# Patient Record
Sex: FEMALE | Race: BLACK OR AFRICAN AMERICAN | Employment: FULL TIME | URBAN - METROPOLITAN AREA
[De-identification: names, ages, dates, MRNs, and addresses within clinical notes are randomized per-mention and may not be internally consistent; named-entity substitution may affect disease eponyms.]

---

## 2023-11-01 ENCOUNTER — HOSPITAL ENCOUNTER (EMERGENCY)
Age: 68
Discharge: HOME | End: 2023-11-01
Attending: EMERGENCY MEDICINE
Payer: MEDICAID

## 2023-11-01 ENCOUNTER — APPOINTMENT (OUTPATIENT)
Dept: GENERAL RADIOLOGY | Age: 68
End: 2023-11-01
Payer: MEDICAID

## 2023-11-01 VITALS
OXYGEN SATURATION: 92 % | RESPIRATION RATE: 18 BRPM | TEMPERATURE: 97.8 F | HEART RATE: 73 BPM | DIASTOLIC BLOOD PRESSURE: 88 MMHG | SYSTOLIC BLOOD PRESSURE: 168 MMHG | WEIGHT: 197 LBS

## 2023-11-01 DIAGNOSIS — R09.02 HYPOXIA: ICD-10-CM

## 2023-11-01 DIAGNOSIS — I10 PRIMARY HYPERTENSION: ICD-10-CM

## 2023-11-01 DIAGNOSIS — J18.9 PNEUMONIA DUE TO INFECTIOUS ORGANISM, UNSPECIFIED LATERALITY, UNSPECIFIED PART OF LUNG: ICD-10-CM

## 2023-11-01 DIAGNOSIS — R06.09 DYSPNEA ON EXERTION: Primary | ICD-10-CM

## 2023-11-01 DIAGNOSIS — J44.9 CHRONIC OBSTRUCTIVE PULMONARY DISEASE, UNSPECIFIED COPD TYPE (HCC): ICD-10-CM

## 2023-11-01 LAB
ANION GAP SERPL CALCULATED.3IONS-SCNC: 21 MMOL/L (ref 7–16)
BASOPHILS # BLD: 0.04 K/UL (ref 0–0.2)
BASOPHILS NFR BLD: 1 % (ref 0–2)
BILIRUB UR QL STRIP: NEGATIVE
BUN SERPL-MCNC: 6 MG/DL (ref 6–23)
CALCIUM SERPL-MCNC: 9.6 MG/DL (ref 8.6–10.2)
CHLORIDE SERPL-SCNC: 102 MMOL/L (ref 98–107)
CLARITY UR: CLEAR
CO2 SERPL-SCNC: 19 MMOL/L (ref 22–29)
COLOR UR: YELLOW
COMMENT: ABNORMAL
CREAT SERPL-MCNC: 0.5 MG/DL (ref 0.5–1)
D DIMER: 290 NG/ML DDU (ref 0–232)
EOSINOPHIL # BLD: 0.28 K/UL (ref 0.05–0.5)
EOSINOPHILS RELATIVE PERCENT: 4 % (ref 0–6)
ERYTHROCYTE [DISTWIDTH] IN BLOOD BY AUTOMATED COUNT: 13.3 % (ref 11.5–15)
ERYTHROCYTE [SEDIMENTATION RATE] IN BLOOD BY WESTERGREN METHOD: 35 MM/HR (ref 0–20)
GFR SERPL CREATININE-BSD FRML MDRD: >60 ML/MIN/1.73M2
GLUCOSE SERPL-MCNC: 128 MG/DL (ref 74–99)
GLUCOSE UR STRIP-MCNC: NEGATIVE MG/DL
HCT VFR BLD AUTO: 45.6 % (ref 34–48)
HGB BLD-MCNC: 15 G/DL (ref 11.5–15.5)
HGB UR QL STRIP.AUTO: NEGATIVE
IMM GRANULOCYTES # BLD AUTO: 0.03 K/UL (ref 0–0.58)
IMM GRANULOCYTES NFR BLD: 1 % (ref 0–5)
KETONES UR STRIP-MCNC: 15 MG/DL
LEUKOCYTE ESTERASE UR QL STRIP: NEGATIVE
LYMPHOCYTES NFR BLD: 1.83 K/UL (ref 1.5–4)
LYMPHOCYTES RELATIVE PERCENT: 29 % (ref 20–42)
MCH RBC QN AUTO: 31.2 PG (ref 26–35)
MCHC RBC AUTO-ENTMCNC: 32.9 G/DL (ref 32–34.5)
MCV RBC AUTO: 94.8 FL (ref 80–99.9)
MONOCYTES NFR BLD: 0.33 K/UL (ref 0.1–0.95)
MONOCYTES NFR BLD: 5 % (ref 2–12)
NEUTROPHILS NFR BLD: 60 % (ref 43–80)
NEUTS SEG NFR BLD: 3.79 K/UL (ref 1.8–7.3)
NITRITE UR QL STRIP: NEGATIVE
PH UR STRIP: 6 [PH] (ref 5–9)
PLATELET # BLD AUTO: 323 K/UL (ref 130–450)
PMV BLD AUTO: 9.1 FL (ref 7–12)
POTASSIUM SERPL-SCNC: 5 MMOL/L (ref 3.5–5)
PROCALCITONIN SERPL-MCNC: <0.02 NG/ML (ref 0–0.08)
PROT UR STRIP-MCNC: NEGATIVE MG/DL
RBC # BLD AUTO: 4.81 M/UL (ref 3.5–5.5)
SODIUM SERPL-SCNC: 142 MMOL/L (ref 132–146)
SP GR UR STRIP: 1.01 (ref 1–1.03)
UROBILINOGEN UR STRIP-ACNC: 1 EU/DL (ref 0–1)
WBC OTHER # BLD: 6.3 K/UL (ref 4.5–11.5)

## 2023-11-01 PROCEDURE — 96374 THER/PROPH/DIAG INJ IV PUSH: CPT

## 2023-11-01 PROCEDURE — 81003 URINALYSIS AUTO W/O SCOPE: CPT

## 2023-11-01 PROCEDURE — 85379 FIBRIN DEGRADATION QUANT: CPT

## 2023-11-01 PROCEDURE — 93005 ELECTROCARDIOGRAM TRACING: CPT | Performed by: EMERGENCY MEDICINE

## 2023-11-01 PROCEDURE — 84145 PROCALCITONIN (PCT): CPT

## 2023-11-01 PROCEDURE — 71045 X-RAY EXAM CHEST 1 VIEW: CPT

## 2023-11-01 PROCEDURE — 6360000002 HC RX W HCPCS

## 2023-11-01 PROCEDURE — 87086 URINE CULTURE/COLONY COUNT: CPT

## 2023-11-01 PROCEDURE — 85025 COMPLETE CBC W/AUTO DIFF WBC: CPT

## 2023-11-01 PROCEDURE — 85652 RBC SED RATE AUTOMATED: CPT

## 2023-11-01 PROCEDURE — 80048 BASIC METABOLIC PNL TOTAL CA: CPT

## 2023-11-01 PROCEDURE — 99284 EMERGENCY DEPT VISIT MOD MDM: CPT

## 2023-11-01 RX ORDER — PREDNISONE 20 MG/1
20 TABLET ORAL 2 TIMES DAILY
Qty: 10 TABLET | Refills: 0 | Status: SHIPPED | OUTPATIENT
Start: 2023-11-01 | End: 2023-11-06

## 2023-11-01 RX ORDER — LABETALOL HYDROCHLORIDE 5 MG/ML
10 INJECTION, SOLUTION INTRAVENOUS ONCE
Status: COMPLETED | OUTPATIENT
Start: 2023-11-01 | End: 2023-11-01

## 2023-11-01 RX ORDER — DOXYCYCLINE HYCLATE 100 MG
100 TABLET ORAL 2 TIMES DAILY
Qty: 20 TABLET | Refills: 0 | Status: SHIPPED | OUTPATIENT
Start: 2023-11-01 | End: 2023-11-11

## 2023-11-01 RX ORDER — ALBUTEROL SULFATE 90 UG/1
2 AEROSOL, METERED RESPIRATORY (INHALATION) 4 TIMES DAILY PRN
Qty: 18 G | Refills: 0 | Status: SHIPPED | OUTPATIENT
Start: 2023-11-01

## 2023-11-01 RX ORDER — BENZONATATE 100 MG/1
100 CAPSULE ORAL 3 TIMES DAILY PRN
Qty: 30 CAPSULE | Refills: 0 | Status: SHIPPED | OUTPATIENT
Start: 2023-11-01 | End: 2023-11-11

## 2023-11-01 RX ADMIN — LABETALOL HYDROCHLORIDE 10 MG: 5 INJECTION INTRAVENOUS at 17:19

## 2023-11-01 ASSESSMENT — PATIENT HEALTH QUESTIONNAIRE - PHQ9
SUM OF ALL RESPONSES TO PHQ9 QUESTIONS 1 & 2: 0
SUM OF ALL RESPONSES TO PHQ QUESTIONS 1-9: 0
2. FEELING DOWN, DEPRESSED OR HOPELESS: 0
1. LITTLE INTEREST OR PLEASURE IN DOING THINGS: 0
SUM OF ALL RESPONSES TO PHQ QUESTIONS 1-9: 0

## 2023-11-01 ASSESSMENT — LIFESTYLE VARIABLES: HOW OFTEN DO YOU HAVE A DRINK CONTAINING ALCOHOL: 2-4 TIMES A MONTH

## 2023-11-01 ASSESSMENT — PAIN - FUNCTIONAL ASSESSMENT
PAIN_FUNCTIONAL_ASSESSMENT: NONE - DENIES PAIN
PAIN_FUNCTIONAL_ASSESSMENT: NONE - DENIES PAIN

## 2023-11-01 NOTE — DISCHARGE INSTRUCTIONS
Take albuterol inhaler as needed for increasing shortness of breath  You will take prednisone 20 twice a day for 5 days   You will take doxycycline, an antibiotic, twice a day for 10 days   Take tessalon perles 3 times a day as needed for cough   You may take mucinex for and tylenol to help with any pain

## 2023-11-01 NOTE — ED PROVIDER NOTES
[address] [facility] [address] ENCOUNTER        Pt Name: Dino Bosworth  MRN: [id number]  [address] [facility] 1955  Date of evaluation: 11/1/2023  Provider: Kirsten Lai MD  PCP: No primary care provider on file. Note Started: 4:48 PM EDT 11/1/23    CHIEF COMPLAINT       Chief Complaint   Patient presents with    Shortness of Breath     Sinus infection gone to my chest, SOB with exerction, HTN has hx takes BP meds stopped taking meds and is DM stopped taking metformin. Blurry vision. Hypertension     Stopped all medication years ago. HISTORY OF PRESENT ILLNESS: 1 or more Elements   History From: Patient    Dino Bosworth is a 76 y.o. female who presents with increasing dyspnea  Patient has not seen her family doctor in the years but has a past medical history of hypertension, diabetes, stroke in 2009 with no residual weakness but occasional dysarthria. Last 1.5 weeks patient says that her sinuses have been draining which is then progressed to going down her chest.  She says this normally happens when the season changes. She says she feels like she has a cold and has had a productive cough initially yellow which changed to green. Patient denies having any fever, no pleuritic chest pain, she is a current smoker, when resting it takes about 5 minutes to improve her dyspnea. Patient denies any headache, appetite has been good, no constipation diarrhea but patient does endorse some increased urinary frequency without dysuria. Patient denies any acute vision changes, says that she has cataracts bilaterally. Nursing Notes were all reviewed and agreed with or any disagreements were addressed in the HPI. REVIEW OF EXTERNAL NOTE :       Has not seen a physician in years    REVIEW OF SYSTEMS :      Positives and Pertinent negatives as per HPI. SURGICAL HISTORY   No past surgical history on file.     CURRENTMEDICATIONS       Previous

## 2023-11-02 LAB
EKG ATRIAL RATE: 90 BPM
EKG P AXIS: 67 DEGREES
EKG P-R INTERVAL: 198 MS
EKG Q-T INTERVAL: 384 MS
EKG QRS DURATION: 74 MS
EKG QTC CALCULATION (BAZETT): 469 MS
EKG R AXIS: 64 DEGREES
EKG T AXIS: 62 DEGREES
EKG VENTRICULAR RATE: 90 BPM

## 2023-11-03 LAB
MICROORGANISM SPEC CULT: NO GROWTH
SPECIMEN DESCRIPTION: NORMAL

## 2023-12-26 ENCOUNTER — APPOINTMENT (OUTPATIENT)
Dept: CT IMAGING | Age: 68
DRG: 193 | End: 2023-12-26
Attending: STUDENT IN AN ORGANIZED HEALTH CARE EDUCATION/TRAINING PROGRAM
Payer: MEDICARE

## 2023-12-26 ENCOUNTER — HOSPITAL ENCOUNTER (INPATIENT)
Age: 68
LOS: 1 days | Discharge: HOME | DRG: 193 | End: 2023-12-28
Attending: STUDENT IN AN ORGANIZED HEALTH CARE EDUCATION/TRAINING PROGRAM | Admitting: INTERNAL MEDICINE
Payer: MEDICARE

## 2023-12-26 ENCOUNTER — APPOINTMENT (OUTPATIENT)
Dept: GENERAL RADIOLOGY | Age: 68
DRG: 193 | End: 2023-12-26
Payer: MEDICARE

## 2023-12-26 DIAGNOSIS — J45.909 MILD ASTHMA WITHOUT COMPLICATION, UNSPECIFIED WHETHER PERSISTENT: ICD-10-CM

## 2023-12-26 DIAGNOSIS — R59.9 ADENOPATHY: ICD-10-CM

## 2023-12-26 DIAGNOSIS — J96.01 ACUTE RESPIRATORY FAILURE WITH HYPOXIA (HCC): Primary | ICD-10-CM

## 2023-12-26 DIAGNOSIS — J18.9 PNEUMONIA DUE TO INFECTIOUS ORGANISM, UNSPECIFIED LATERALITY, UNSPECIFIED PART OF LUNG: ICD-10-CM

## 2023-12-26 DIAGNOSIS — R79.89 ELEVATED D-DIMER: ICD-10-CM

## 2023-12-26 DIAGNOSIS — I10 UNCONTROLLED HYPERTENSION: ICD-10-CM

## 2023-12-26 LAB
ALBUMIN SERPL-MCNC: 4.4 G/DL (ref 3.5–5.2)
ALP SERPL-CCNC: 50 U/L (ref 35–104)
ALT SERPL-CCNC: 11 U/L (ref 0–32)
ANION GAP SERPL CALCULATED.3IONS-SCNC: 10 MMOL/L (ref 7–16)
AST SERPL-CCNC: 15 U/L (ref 0–31)
BASOPHILS # BLD: 0.06 K/UL (ref 0–0.2)
BASOPHILS NFR BLD: 1 % (ref 0–2)
BILIRUB SERPL-MCNC: 0.4 MG/DL (ref 0–1.2)
BNP SERPL-MCNC: 107 PG/ML (ref 0–125)
BUN SERPL-MCNC: 11 MG/DL (ref 6–23)
CALCIUM SERPL-MCNC: 9.6 MG/DL (ref 8.6–10.2)
CHLORIDE SERPL-SCNC: 101 MMOL/L (ref 98–107)
CO2 SERPL-SCNC: 30 MMOL/L (ref 22–29)
CREAT SERPL-MCNC: 0.7 MG/DL (ref 0.5–1)
EOSINOPHIL # BLD: 0.51 K/UL (ref 0.05–0.5)
EOSINOPHILS RELATIVE PERCENT: 7 % (ref 0–6)
ERYTHROCYTE [DISTWIDTH] IN BLOOD BY AUTOMATED COUNT: 13.4 % (ref 11.5–15)
GFR SERPL CREATININE-BSD FRML MDRD: >60 ML/MIN/1.73M2
GLUCOSE SERPL-MCNC: 95 MG/DL (ref 74–99)
HCT VFR BLD AUTO: 48.1 % (ref 34–48)
HGB BLD-MCNC: 16 G/DL (ref 11.5–15.5)
IMM GRANULOCYTES # BLD AUTO: <0.03 K/UL (ref 0–0.58)
IMM GRANULOCYTES NFR BLD: 0 % (ref 0–5)
INFLUENZA A BY PCR: NOT DETECTED
INFLUENZA B BY PCR: NOT DETECTED
LYMPHOCYTES NFR BLD: 2.55 K/UL (ref 1.5–4)
LYMPHOCYTES RELATIVE PERCENT: 35 % (ref 20–42)
MCH RBC QN AUTO: 31.2 PG (ref 26–35)
MCHC RBC AUTO-ENTMCNC: 33.3 G/DL (ref 32–34.5)
MCV RBC AUTO: 93.8 FL (ref 80–99.9)
MONOCYTES NFR BLD: 0.51 K/UL (ref 0.1–0.95)
MONOCYTES NFR BLD: 7 % (ref 2–12)
NEUTROPHILS NFR BLD: 50 % (ref 43–80)
NEUTS SEG NFR BLD: 3.58 K/UL (ref 1.8–7.3)
PLATELET # BLD AUTO: 313 K/UL (ref 130–450)
PMV BLD AUTO: 9.1 FL (ref 7–12)
POTASSIUM SERPL-SCNC: 4.1 MMOL/L (ref 3.5–5)
PROT SERPL-MCNC: 8.7 G/DL (ref 6.4–8.3)
RBC # BLD AUTO: 5.13 M/UL (ref 3.5–5.5)
SARS-COV-2 RDRP RESP QL NAA+PROBE: NOT DETECTED
SODIUM SERPL-SCNC: 141 MMOL/L (ref 132–146)
SPECIMEN DESCRIPTION: NORMAL
TROPONIN I SERPL HS-MCNC: 7 NG/L (ref 0–9)
WBC OTHER # BLD: 7.2 K/UL (ref 4.5–11.5)

## 2023-12-26 PROCEDURE — 83880 ASSAY OF NATRIURETIC PEPTIDE: CPT

## 2023-12-26 PROCEDURE — 71045 X-RAY EXAM CHEST 1 VIEW: CPT

## 2023-12-26 PROCEDURE — 87502 INFLUENZA DNA AMP PROBE: CPT

## 2023-12-26 PROCEDURE — 71275 CT ANGIOGRAPHY CHEST: CPT

## 2023-12-26 PROCEDURE — 99285 EMERGENCY DEPT VISIT HI MDM: CPT

## 2023-12-26 PROCEDURE — 87635 SARS-COV-2 COVID-19 AMP PRB: CPT

## 2023-12-26 PROCEDURE — 85025 COMPLETE CBC W/AUTO DIFF WBC: CPT

## 2023-12-26 PROCEDURE — 84484 ASSAY OF TROPONIN QUANT: CPT

## 2023-12-26 PROCEDURE — 80053 COMPREHEN METABOLIC PANEL: CPT

## 2023-12-26 PROCEDURE — 6360000004 HC RX CONTRAST MEDICATION: Performed by: RADIOLOGY

## 2023-12-26 RX ORDER — LEVOFLOXACIN 5 MG/ML
750 INJECTION, SOLUTION INTRAVENOUS ONCE
Status: DISCONTINUED | OUTPATIENT
Start: 2023-12-26 | End: 2023-12-26

## 2023-12-26 RX ORDER — LABETALOL HYDROCHLORIDE 5 MG/ML
10 INJECTION, SOLUTION INTRAVENOUS ONCE
Status: COMPLETED | OUTPATIENT
Start: 2023-12-26 | End: 2023-12-27

## 2023-12-26 RX ADMIN — IOPAMIDOL 70 ML: 755 INJECTION, SOLUTION INTRAVENOUS at 20:33

## 2023-12-26 NOTE — ED NOTES
Department of Emergency Medicine  FIRST PROVIDER TRIAGE NOTE             Independent MLP           12/26/23  12:29 PM EST    Date of Encounter: 12/26/23   MRN: [id number]      HPI: Milad Conner is a 76 y.o. female who presents to the ED for Shortness of Breath (Since November), Dizziness (When she bends over off and on x 2 wks. ), Fatigue, and Nasal Congestion         ROS: Negative for vomiting or diarrhea. PE: Gen Appearance/Constitutional: alert  HEENT: NC/NT. PERRLA,  Airway patent. Initial Plan of Care: All treatment areas with department are currently occupied. Plan to order/Initiate the following while awaiting opening in ED: EKG.   Initiate Treatment-Testing, Proceed toTreatment Area When Bed Available for ED Attending/MLP to Continue Care    Electronically signed by Abdulkadir Davis PA-C   DD: 12/26/23      Abdulkadir Davis PA-C  12/26/23 1894

## 2023-12-27 PROBLEM — J96.01 ACUTE RESPIRATORY FAILURE WITH HYPOXIA (HCC): Status: ACTIVE | Noted: 2023-12-27

## 2023-12-27 LAB
B PARAP IS1001 DNA NPH QL NAA+NON-PROBE: NOT DETECTED
B PERT DNA SPEC QL NAA+PROBE: NOT DETECTED
BASOPHILS # BLD: 0.06 K/UL (ref 0–0.2)
BASOPHILS NFR BLD: 1 % (ref 0–2)
C PNEUM DNA NPH QL NAA+NON-PROBE: NOT DETECTED
CRP SERPL HS-MCNC: 6 MG/L (ref 0–5)
EOSINOPHIL # BLD: 0.48 K/UL (ref 0.05–0.5)
EOSINOPHILS RELATIVE PERCENT: 8 % (ref 0–6)
ERYTHROCYTE [DISTWIDTH] IN BLOOD BY AUTOMATED COUNT: 13.4 % (ref 11.5–15)
FLUAV RNA NPH QL NAA+NON-PROBE: NOT DETECTED
FLUBV RNA NPH QL NAA+NON-PROBE: NOT DETECTED
HADV DNA NPH QL NAA+NON-PROBE: NOT DETECTED
HBA1C MFR BLD: 7 % (ref 4–5.6)
HCOV 229E RNA NPH QL NAA+NON-PROBE: NOT DETECTED
HCOV HKU1 RNA NPH QL NAA+NON-PROBE: NOT DETECTED
HCOV NL63 RNA NPH QL NAA+NON-PROBE: NOT DETECTED
HCOV OC43 RNA NPH QL NAA+NON-PROBE: NOT DETECTED
HCT VFR BLD AUTO: 44.2 % (ref 34–48)
HGB BLD-MCNC: 14.3 G/DL (ref 11.5–15.5)
HMPV RNA NPH QL NAA+NON-PROBE: NOT DETECTED
HPIV1 RNA NPH QL NAA+NON-PROBE: NOT DETECTED
HPIV2 RNA NPH QL NAA+NON-PROBE: NOT DETECTED
HPIV3 RNA NPH QL NAA+NON-PROBE: NOT DETECTED
HPIV4 RNA NPH QL NAA+NON-PROBE: NOT DETECTED
IMM GRANULOCYTES # BLD AUTO: <0.03 K/UL (ref 0–0.58)
IMM GRANULOCYTES NFR BLD: 0 % (ref 0–5)
LACTATE BLDV-SCNC: 1.3 MMOL/L (ref 0.5–1.9)
LYMPHOCYTES NFR BLD: 2.6 K/UL (ref 1.5–4)
LYMPHOCYTES RELATIVE PERCENT: 41 % (ref 20–42)
M PNEUMO DNA NPH QL NAA+NON-PROBE: NOT DETECTED
MCH RBC QN AUTO: 30.7 PG (ref 26–35)
MCHC RBC AUTO-ENTMCNC: 32.4 G/DL (ref 32–34.5)
MCV RBC AUTO: 94.8 FL (ref 80–99.9)
MONOCYTES NFR BLD: 0.41 K/UL (ref 0.1–0.95)
MONOCYTES NFR BLD: 7 % (ref 2–12)
NEUTROPHILS NFR BLD: 43 % (ref 43–80)
NEUTS SEG NFR BLD: 2.72 K/UL (ref 1.8–7.3)
PLATELET # BLD AUTO: 277 K/UL (ref 130–450)
PMV BLD AUTO: 9.1 FL (ref 7–12)
PROCALCITONIN SERPL-MCNC: 0.03 NG/ML (ref 0–0.08)
RBC # BLD AUTO: 4.66 M/UL (ref 3.5–5.5)
RSV RNA NPH QL NAA+NON-PROBE: NOT DETECTED
RV+EV RNA NPH QL NAA+NON-PROBE: NOT DETECTED
SARS-COV-2 RNA NPH QL NAA+NON-PROBE: NOT DETECTED
SPECIMEN DESCRIPTION: NORMAL
WBC OTHER # BLD: 6.3 K/UL (ref 4.5–11.5)

## 2023-12-27 PROCEDURE — 0202U NFCT DS 22 TRGT SARS-COV-2: CPT

## 2023-12-27 PROCEDURE — 83036 HEMOGLOBIN GLYCOSYLATED A1C: CPT

## 2023-12-27 PROCEDURE — 6360000002 HC RX W HCPCS: Performed by: EMERGENCY MEDICINE

## 2023-12-27 PROCEDURE — 84145 PROCALCITONIN (PCT): CPT

## 2023-12-27 PROCEDURE — 6360000002 HC RX W HCPCS: Performed by: INTERNAL MEDICINE

## 2023-12-27 PROCEDURE — 82785 ASSAY OF IGE: CPT

## 2023-12-27 PROCEDURE — 2580000003 HC RX 258: Performed by: INTERNAL MEDICINE

## 2023-12-27 PROCEDURE — 86140 C-REACTIVE PROTEIN: CPT

## 2023-12-27 PROCEDURE — 82103 ALPHA-1-ANTITRYPSIN TOTAL: CPT

## 2023-12-27 PROCEDURE — 86606 ASPERGILLUS ANTIBODY: CPT

## 2023-12-27 PROCEDURE — 87040 BLOOD CULTURE FOR BACTERIA: CPT

## 2023-12-27 PROCEDURE — 6370000000 HC RX 637 (ALT 250 FOR IP): Performed by: INTERNAL MEDICINE

## 2023-12-27 PROCEDURE — 85025 COMPLETE CBC W/AUTO DIFF WBC: CPT

## 2023-12-27 PROCEDURE — 2580000003 HC RX 258: Performed by: STUDENT IN AN ORGANIZED HEALTH CARE EDUCATION/TRAINING PROGRAM

## 2023-12-27 PROCEDURE — 86331 IMMUNODIFFUSION OUCHTERLONY: CPT

## 2023-12-27 PROCEDURE — 94640 AIRWAY INHALATION TREATMENT: CPT

## 2023-12-27 PROCEDURE — 99232 SBSQ HOSP IP/OBS MODERATE 35: CPT | Performed by: INTERNAL MEDICINE

## 2023-12-27 PROCEDURE — 6360000002 HC RX W HCPCS: Performed by: STUDENT IN AN ORGANIZED HEALTH CARE EDUCATION/TRAINING PROGRAM

## 2023-12-27 PROCEDURE — 2060000000 HC ICU INTERMEDIATE R&B

## 2023-12-27 PROCEDURE — 99222 1ST HOSP IP/OBS MODERATE 55: CPT | Performed by: INTERNAL MEDICINE

## 2023-12-27 PROCEDURE — 83605 ASSAY OF LACTIC ACID: CPT

## 2023-12-27 PROCEDURE — 36415 COLL VENOUS BLD VENIPUNCTURE: CPT

## 2023-12-27 PROCEDURE — 2700000000 HC OXYGEN THERAPY PER DAY

## 2023-12-27 PROCEDURE — 99223 1ST HOSP IP/OBS HIGH 75: CPT | Performed by: INTERNAL MEDICINE

## 2023-12-27 PROCEDURE — 96365 THER/PROPH/DIAG IV INF INIT: CPT

## 2023-12-27 PROCEDURE — 96375 TX/PRO/DX INJ NEW DRUG ADDON: CPT

## 2023-12-27 PROCEDURE — 82164 ANGIOTENSIN I ENZYME TEST: CPT

## 2023-12-27 RX ORDER — IPRATROPIUM BROMIDE AND ALBUTEROL SULFATE 2.5; .5 MG/3ML; MG/3ML
1 SOLUTION RESPIRATORY (INHALATION) EVERY 4 HOURS PRN
Status: DISCONTINUED | OUTPATIENT
Start: 2023-12-27 | End: 2023-12-28 | Stop reason: HOSPADM

## 2023-12-27 RX ORDER — BUDESONIDE 0.25 MG/2ML
250 INHALANT ORAL 2 TIMES DAILY
Status: DISCONTINUED | OUTPATIENT
Start: 2023-12-27 | End: 2023-12-28 | Stop reason: HOSPADM

## 2023-12-27 RX ORDER — AMLODIPINE BESYLATE 5 MG/1
5 TABLET ORAL DAILY
Status: DISCONTINUED | OUTPATIENT
Start: 2023-12-27 | End: 2023-12-28 | Stop reason: HOSPADM

## 2023-12-27 RX ORDER — ENOXAPARIN SODIUM 100 MG/ML
40 INJECTION SUBCUTANEOUS DAILY
Status: DISCONTINUED | OUTPATIENT
Start: 2023-12-28 | End: 2023-12-28 | Stop reason: HOSPADM

## 2023-12-27 RX ORDER — 0.9 % SODIUM CHLORIDE 0.9 %
1000 INTRAVENOUS SOLUTION INTRAVENOUS ONCE
Status: COMPLETED | OUTPATIENT
Start: 2023-12-27 | End: 2023-12-27

## 2023-12-27 RX ORDER — IBUPROFEN 200 MG
200 TABLET ORAL EVERY 6 HOURS PRN
COMMUNITY

## 2023-12-27 RX ORDER — IPRATROPIUM BROMIDE AND ALBUTEROL SULFATE 2.5; .5 MG/3ML; MG/3ML
1 SOLUTION RESPIRATORY (INHALATION)
Status: DISCONTINUED | OUTPATIENT
Start: 2023-12-27 | End: 2023-12-28 | Stop reason: HOSPADM

## 2023-12-27 RX ORDER — ARFORMOTEROL TARTRATE 15 UG/2ML
15 SOLUTION RESPIRATORY (INHALATION)
Status: DISCONTINUED | OUTPATIENT
Start: 2023-12-27 | End: 2023-12-28 | Stop reason: HOSPADM

## 2023-12-27 RX ADMIN — IPRATROPIUM BROMIDE AND ALBUTEROL SULFATE 1 DOSE: 2.5; .5 SOLUTION RESPIRATORY (INHALATION) at 16:37

## 2023-12-27 RX ADMIN — BUDESONIDE 250 MCG: 0.25 SUSPENSION RESPIRATORY (INHALATION) at 20:24

## 2023-12-27 RX ADMIN — WATER 1000 MG: 1 INJECTION INTRAMUSCULAR; INTRAVENOUS; SUBCUTANEOUS at 00:57

## 2023-12-27 RX ADMIN — ARFORMOTEROL TARTRATE 15 MCG: 15 SOLUTION RESPIRATORY (INHALATION) at 20:24

## 2023-12-27 RX ADMIN — WATER 40 MG: 1 INJECTION INTRAMUSCULAR; INTRAVENOUS; SUBCUTANEOUS at 05:32

## 2023-12-27 RX ADMIN — AZITHROMYCIN MONOHYDRATE 500 MG: 500 INJECTION, POWDER, LYOPHILIZED, FOR SOLUTION INTRAVENOUS at 01:04

## 2023-12-27 RX ADMIN — SODIUM CHLORIDE 1000 ML: 9 INJECTION, SOLUTION INTRAVENOUS at 02:11

## 2023-12-27 RX ADMIN — IPRATROPIUM BROMIDE AND ALBUTEROL SULFATE 1 DOSE: 2.5; .5 SOLUTION RESPIRATORY (INHALATION) at 20:24

## 2023-12-27 RX ADMIN — WATER 40 MG: 1 INJECTION INTRAMUSCULAR; INTRAVENOUS; SUBCUTANEOUS at 15:19

## 2023-12-27 RX ADMIN — IPRATROPIUM BROMIDE AND ALBUTEROL SULFATE 1 DOSE: 2.5; .5 SOLUTION RESPIRATORY (INHALATION) at 05:36

## 2023-12-27 RX ADMIN — AMLODIPINE BESYLATE 5 MG: 5 TABLET ORAL at 10:03

## 2023-12-27 RX ADMIN — LABETALOL HYDROCHLORIDE 10 MG: 5 INJECTION, SOLUTION INTRAVENOUS at 00:54

## 2023-12-27 NOTE — CONSULTS
CONTRAST    Result Date: 12/26/2023  EXAMINATION: CTA OF THE CHEST 12/26/2023 8:26 pm TECHNIQUE: CTA of the chest was performed after the administration of intravenous contrast.  Multiplanar reformatted images are provided for review. MIP images are provided for review. Automated exposure control, iterative reconstruction, and/or weight based adjustment of the mA/kV was utilized to reduce the radiation dose to as low as reasonably achievable. COMPARISON: Chest series from December 26, 2020 HISTORY: ORDERING SYSTEM PROVIDED HISTORY: SOB TECHNOLOGIST PROVIDED HISTORY: Reason for exam:->SOB Additional Contrast?->1 What reading provider will be dictating this exam?->CRC FINDINGS: Pulmonary Arteries: Pulmonary arteries are adequately opacified for evaluation. No evidence of intraluminal filling defect to suggest pulmonary embolism. Main pulmonary artery is normal in caliber. Mediastinum: Several prominent mediastinal and hilar lymph nodes. Conglomerate lymphadenopathy in the subcarinal region on series 4, image 147. Normal appearance of the thoracic aorta and arch vessels. No aneurysmal dilatation. Heart chambers are not enlarged. No pericardial effusion. No evidence of right heart strain. Mild coronary artery disease. Normal course and appearance of the esophagus. Lungs/pleura: Airway is patent. No endobronchial lesions. Scattered ill-defined interstitial and ground-glass opacities. Edema versus infection. No pleural effusion or pneumothorax. No cystic lung parenchymal change or bronchiectasis. Upper Abdomen: Focal area of decreased attenuation in the right hepatic lobe series 4, image 245. Areas not fully evaluated on this study. Remaining imaged upper abdomen is unremarkable. Soft Tissues/Bones: Thyroid gland appears enlarged. Remaining soft tissue structures of the neck appear unremarkable. No suspicious axillary lymph nodes. The anterior and posterior chest wall is unremarkable.   Vertebral body

## 2023-12-27 NOTE — CARE COORDINATION
Social Work/ Case Management Transition of Care Planning ([address], [address]): Pt presents to the hospital through the ED with SOB. SW met with Pt at bedside who states she lives in a house with her 2 daughters. 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 Pt states she has no hx with HHC/LULA and no DME at home. Pt has no PCP, SW attempted to set up but was on hold for 22 mins then disconnected, will attempt again. Per RUDY consult Pt will likely need home O2, Pt has no preference in DME company, referral made to [address] [place] [address] DME they are following. Ambulating Pulse ox template in sticky note. RUDY/KELSEA to follow.    [address] [place], [place]  12/27/2023

## 2023-12-27 NOTE — ED PROVIDER NOTES
emergent multiphase CT evaluation of the liver (liver mass   protocol) to better characterize heterogeneous area in the right hepatic lobe. XR CHEST 1 VIEW   Final Result   1. Vague patchy bilateral airspace disease slightly more prominent within the   lower lobes. .           Labs Reviewed   CBC WITH AUTO DIFFERENTIAL - Abnormal; Notable for the following components:       Result Value    Hemoglobin 16.0 (*)     Hematocrit 48.1 (*)     Eosinophils % 7 (*)     Eosinophils Absolute 0.51 (*)     All other components within normal limits   COMPREHENSIVE METABOLIC PANEL W/ REFLEX TO MG FOR LOW K - Abnormal; Notable for the following components:    CO2 30 (*)     Total Protein 8.7 (*)     All other components within normal limits   COVID-19, RAPID   INFLUENZA A + B, PCR   CULTURE, BLOOD 1   CULTURE, BLOOD 2   RESPIRATORY PANEL, MOLECULAR, WITH COVID-19   BRAIN NATRIURETIC PEPTIDE   TROPONIN   LACTATE, SEPSIS   LACTATE, SEPSIS   CBC WITH AUTO DIFFERENTIAL   PROCALCITONIN   C-REACTIVE PROTEIN     ED Course as of 12/27/23 0250   Wed Dec 27, 2023   0145 Spoke with Dr Julianna Coley - Internal Medicine, they will admit  [ME]      ED Course User Index  [ME] Lucian Alvarez DO       Exclusion criteria - the patient is NOT to be included for SEP-1 Core Measure due to:  2+ SIRS criteria are not met    MDM:     I, Dr. Peggy Mclaughlin am the primary provider of record    Medical Decision Making  Patient signed out to me pending return of blood work and CTA, IV antibiotics already ordered, there is unfortunately delaying infusing the antibiotics secondary to no location within the department after infused them. Blood pressure controlled after IV medications, she is in the proximal respiratory failure not normally on supplemental oxygen, spoke with medicine patient be admitted for further care    Patient is placed on cardiac monitor and continuous pulse ox for monitoring.  EKG is ordered to evaluate patient's current cardiac rhythm,

## 2023-12-27 NOTE — H&P
antitrypsin level, Hypersenstivitiy pneumonitis, aspirgillosis hypersensitivity?   Check ACE, hypersenstive pneumonitis panel, IgE levels  Pulmonary doc- rule out BOOP or other non infectious bronchospastic process  Steroids  Recommend controller- puffers, home o2 etc.   for med coverage    Niels Riddle MD   Night Officer, overnight admitting doctor at [facility] call day time doctor   for questions after 7:30am    Covering for Pathflow Service  If Qs please call [phone]  Electronically signed by Emeterio Botello MD on 12/27/2023 at 1:52 AM

## 2023-12-28 VITALS
SYSTOLIC BLOOD PRESSURE: 153 MMHG | HEIGHT: 64 IN | DIASTOLIC BLOOD PRESSURE: 75 MMHG | TEMPERATURE: 98 F | BODY MASS INDEX: 33.63 KG/M2 | HEART RATE: 75 BPM | OXYGEN SATURATION: 96 % | RESPIRATION RATE: 18 BRPM | WEIGHT: 197 LBS

## 2023-12-28 LAB
A1AT SERPL-MCNC: 131 MG/DL (ref 90–200)
IGE SERPL-ACNC: 7646 IU/ML

## 2023-12-28 PROCEDURE — 6370000000 HC RX 637 (ALT 250 FOR IP): Performed by: INTERNAL MEDICINE

## 2023-12-28 PROCEDURE — 6360000002 HC RX W HCPCS: Performed by: INTERNAL MEDICINE

## 2023-12-28 PROCEDURE — 94640 AIRWAY INHALATION TREATMENT: CPT

## 2023-12-28 PROCEDURE — 2580000003 HC RX 258: Performed by: INTERNAL MEDICINE

## 2023-12-28 PROCEDURE — 2700000000 HC OXYGEN THERAPY PER DAY

## 2023-12-28 PROCEDURE — 99238 HOSP IP/OBS DSCHRG MGMT 30/<: CPT | Performed by: INTERNAL MEDICINE

## 2023-12-28 RX ORDER — AMLODIPINE BESYLATE 5 MG/1
5 TABLET ORAL DAILY
Qty: 30 TABLET | Refills: 3 | Status: SHIPPED | OUTPATIENT
Start: 2023-12-29

## 2023-12-28 RX ORDER — ALBUTEROL SULFATE 90 UG/1
2 AEROSOL, METERED RESPIRATORY (INHALATION) 4 TIMES DAILY PRN
Qty: 18 G | Refills: 5 | Status: SHIPPED | OUTPATIENT
Start: 2023-12-28

## 2023-12-28 RX ORDER — IPRATROPIUM BROMIDE AND ALBUTEROL SULFATE 2.5; .5 MG/3ML; MG/3ML
1 SOLUTION RESPIRATORY (INHALATION) EVERY 6 HOURS PRN
Qty: 360 ML | Refills: 1 | Status: SHIPPED | OUTPATIENT
Start: 2023-12-28

## 2023-12-28 RX ORDER — PREDNISONE 10 MG/1
TABLET ORAL
Qty: 23 TABLET | Refills: 0 | Status: SHIPPED | OUTPATIENT
Start: 2023-12-28

## 2023-12-28 RX ADMIN — IPRATROPIUM BROMIDE AND ALBUTEROL SULFATE 1 DOSE: 2.5; .5 SOLUTION RESPIRATORY (INHALATION) at 13:15

## 2023-12-28 RX ADMIN — IPRATROPIUM BROMIDE AND ALBUTEROL SULFATE 1 DOSE: 2.5; .5 SOLUTION RESPIRATORY (INHALATION) at 08:00

## 2023-12-28 RX ADMIN — ARFORMOTEROL TARTRATE 15 MCG: 15 SOLUTION RESPIRATORY (INHALATION) at 08:00

## 2023-12-28 RX ADMIN — BUDESONIDE 250 MCG: 0.25 SUSPENSION RESPIRATORY (INHALATION) at 08:00

## 2023-12-28 RX ADMIN — WATER 40 MG: 1 INJECTION INTRAMUSCULAR; INTRAVENOUS; SUBCUTANEOUS at 03:36

## 2023-12-28 RX ADMIN — AMLODIPINE BESYLATE 5 MG: 5 TABLET ORAL at 07:27

## 2023-12-28 NOTE — PLAN OF CARE
OK for discharge when ok with primary. IGE level noted. Orders placed for nebulizer, duonebs outpatient, steroid taper, breztri, follow up CT in 6 weeks with follow up in my office. Full note to follow.    Jeff Ramirez, DO

## 2023-12-28 NOTE — PROGRESS NOTES
PULSE OX ON ROOM AIR SITTING_96%   PULSE OX ON ROOM AIR AMBULATING _91___%   PULSE OX  AMBULATING RECOVERY _96__% on room air    Electronically signed by Kelvin Gonsalez RN on 12/28/2023 at 2:31 PM

## 2023-12-28 NOTE — CARE COORDINATION
Patient remains hospitalized with acute respiratory failure with hypoxia. Is on 3 l of oxygen with pox 96%. If unable to be weaned, will need ambulating pulse ox testing. Template in sticky note. [facility] DME is following. Nebulizers and IV Solumedrol continued. At this time, discharge plan is home . CM/SW will continue to follow  Patient did not qualify for home oxygen.

## 2023-12-28 NOTE — PROGRESS NOTES
4 Eyes Skin Assessment     NAME:  Cory Barahona OF BIRTH:  1955  MEDICAL RECORD NUMBER:  [id number]    The patient is being assessed for  Admission    I agree that at least one RN has performed a thorough Head to Toe Skin Assessment on the patient. ALL assessment sites listed below have been assessed. Areas assessed by both nurses:    Head, Face, Ears, Shoulders, Back, Chest, Arms, Elbows, Hands, Sacrum. Buttock, Coccyx, Ischium, Legs. Feet and Heels, and Under Medical Devices         Does the Patient have a Wound?  No noted wound(s)       Dustin Prevention initiated by RN: No  Wound Care Orders initiated by RN: No    Pressure Injury (Stage 3,4, Unstageable, DTI, NWPT, and Complex wounds) if present, place Wound referral order by RN under : No    New Ostomies, if present place, Ostomy referral order under : No     Nurse 1 eSignature: Electronically signed by Virginia Doran RN on 12/28/23 at 1:18 AM EST    **SHARE this note so that the co-signing nurse can place an eSignature**    Nurse 2 eSignature: Electronically signed by Raza Tobias RN on 12/28/23 at 1:19 AM EST

## 2023-12-28 NOTE — ACP (ADVANCE CARE PLANNING)
Advance Care Planning   Healthcare Decision Maker:    Primary Decision Maker: Juany Chiu - Child - [phone]    Secondary Decision Maker: Shiodessa Delgado - Brother/Sister - [phone]    Click here to complete Healthcare Decision Makers including selection of the Healthcare Decision Maker Relationship (ie \"Primary\").

## 2023-12-28 NOTE — PROGRESS NOTES
CLINICAL PHARMACY NOTE: MEDS TO BEDS    Total # of Prescriptions Filled: 5   The following medications were delivered to the patient:  Albuterol sulfate HFA  Prednisone 10 mg  Ipratropium-albuterol 0.5-2.5  Amlodipine besylate 5 mg  Breztri out of stock.  Transferring out to [facility]    Additional Documentation:   Daughter picked up in the pharmacy at register

## 2023-12-29 LAB — ACE SERPL-CCNC: 33 U/L (ref 8–52)

## 2023-12-31 LAB
MICROORGANISM SPEC CULT: NORMAL
MICROORGANISM SPEC CULT: NORMAL
SERVICE CMNT-IMP: NORMAL
SERVICE CMNT-IMP: NORMAL
SPECIMEN DESCRIPTION: NORMAL
SPECIMEN DESCRIPTION: NORMAL

## 2024-01-01 LAB
A FUMIGATUS1 AB SER QL ID: NORMAL
A FUMIGATUS6 AB SER QL ID: NORMAL
A PULLULANS AB SER QL ID: NORMAL
MICROORGANISM SPEC CULT: NORMAL
MICROORGANISM SPEC CULT: NORMAL
PIGEON SERUM AB QL ID: NORMAL
S RECTIVIRGULA AB SER QL ID: NORMAL
SERVICE CMNT-IMP: NORMAL
SERVICE CMNT-IMP: NORMAL
SPECIMEN DESCRIPTION: NORMAL
SPECIMEN DESCRIPTION: NORMAL

## 2024-01-02 LAB
EKG ATRIAL RATE: 100 BPM
EKG P AXIS: 75 DEGREES
EKG P-R INTERVAL: 182 MS
EKG Q-T INTERVAL: 330 MS
EKG QRS DURATION: 76 MS
EKG QTC CALCULATION (BAZETT): 425 MS
EKG R AXIS: 76 DEGREES
EKG T AXIS: 54 DEGREES
EKG VENTRICULAR RATE: 100 BPM

## 2024-02-21 ENCOUNTER — TELEPHONE (OUTPATIENT)
Dept: PULMONOLOGY | Age: 69
End: 2024-02-21

## 2024-02-22 ENCOUNTER — OFFICE VISIT (OUTPATIENT)
Dept: PULMONOLOGY | Age: 69
End: 2024-02-22
Payer: MEDICAID

## 2024-02-22 VITALS
OXYGEN SATURATION: 81 % | DIASTOLIC BLOOD PRESSURE: 82 MMHG | BODY MASS INDEX: 32.02 KG/M2 | WEIGHT: 174 LBS | SYSTOLIC BLOOD PRESSURE: 139 MMHG | TEMPERATURE: 97.7 F | HEART RATE: 122 BPM | RESPIRATION RATE: 18 BRPM | HEIGHT: 62 IN

## 2024-02-22 DIAGNOSIS — J20.9 ACUTE BRONCHITIS, UNSPECIFIED ORGANISM: ICD-10-CM

## 2024-02-22 DIAGNOSIS — J96.01 ACUTE RESPIRATORY FAILURE WITH HYPOXIA (HCC): Primary | ICD-10-CM

## 2024-02-22 DIAGNOSIS — R59.9 ADENOPATHY: ICD-10-CM

## 2024-02-22 LAB
DLCO %PRED: 85 %
DLCO PRED: 21.56 ML/MIN/MMHG
DLCO/VA %PRED: 94 %
DLCO/VA PRED: 4.54 ML/MIN/MMHG
DLCO/VA: 4.28 ML/MIN/MMHG
DLCO: 18.48 ML/MIN/MMHG
EXPIRATORY TIME-POST: 6.38 SEC
EXPIRATORY TIME: 6.41 SEC
FEF 25-75 %CHNG: 19
FEF 25-75 POST %PRED: 61 %
FEF 25-75% %PRED-PRE: 51 L/SEC
FEF 25-75% PRED: 1.62 L/SEC
FEF 25-75-POST: 1 L/SEC
FEF 25-75-PRE: 0.84 L/SEC
FEV1 %PRED-POST: 50 %
FEV1 %PRED-PRE: 48 %
FEV1 PRED: 1.81 L
FEV1-POST: 0.92 L
FEV1-PRE: 0.88 L
FEV1/FVC %PRED-POST: 104 %
FEV1/FVC %PRED-PRE: 103 %
FEV1/FVC PRED: 79 %
FEV1/FVC-POST: 82 %
FEV1/FVC-PRE: 82 %
FVC %PRED-POST: 48 L
FVC %PRED-PRE: 46 %
FVC PRED: 2.31 L
FVC-POST: 1.12 L
FVC-PRE: 1.08 L
GAW %PRED: NORMAL
GAW PRED: NORMAL
GAW: NORMAL
IC PRE %PRED: 73 %
IC PRED: 1.45 L
IC: 1.07 L
MEP: NORMAL
MIP: NORMAL
MVV %PRED-PRE: 52 %
MVV PRED: 83 L/MIN
MVV-PRE: 44 L/MIN
PEF %PRED-POST: 66 %
PEF %PRED-PRE: 53 L/SEC
PEF PRED: 4.64 L/SEC
PEF%CHNG: 23
PEF-POST: 3.1 L/SEC
PEF-PRE: 2.5 L/SEC
RAW %PRED: NORMAL
RAW PRED: NORMAL
RAW: NORMAL
RV PRE %PRED: 81 %
RV PRED: 2.06 L
RV: 1.67 L
SVC %PRED: 57 %
SVC PRED: 2.31 L
SVC: 1.32 L
TLC PRE %PRED: 62 %
TLC PRED: 4.75 L
TLC: 2.99 L
VA %PRED: 88 %
VA PRED: 4.75 L
VA: 4.2 L
VTG %PRED: NORMAL
VTG PRED: NORMAL
VTG: NORMAL

## 2024-02-22 PROCEDURE — 1036F TOBACCO NON-USER: CPT | Performed by: INTERNAL MEDICINE

## 2024-02-22 PROCEDURE — 1123F ACP DISCUSS/DSCN MKR DOCD: CPT | Performed by: INTERNAL MEDICINE

## 2024-02-22 PROCEDURE — 99213 OFFICE O/P EST LOW 20 MIN: CPT | Performed by: INTERNAL MEDICINE

## 2024-02-22 PROCEDURE — G8417 CALC BMI ABV UP PARAM F/U: HCPCS | Performed by: INTERNAL MEDICINE

## 2024-02-22 PROCEDURE — 94060 EVALUATION OF WHEEZING: CPT | Performed by: INTERNAL MEDICINE

## 2024-02-22 PROCEDURE — 3017F COLORECTAL CA SCREEN DOC REV: CPT | Performed by: INTERNAL MEDICINE

## 2024-02-22 PROCEDURE — 94727 GAS DIL/WSHOT DETER LNG VOL: CPT | Performed by: INTERNAL MEDICINE

## 2024-02-22 PROCEDURE — G8428 CUR MEDS NOT DOCUMENT: HCPCS | Performed by: INTERNAL MEDICINE

## 2024-02-22 PROCEDURE — 1090F PRES/ABSN URINE INCON ASSESS: CPT | Performed by: INTERNAL MEDICINE

## 2024-02-22 PROCEDURE — 94729 DIFFUSING CAPACITY: CPT | Performed by: INTERNAL MEDICINE

## 2024-02-22 PROCEDURE — G8400 PT W/DXA NO RESULTS DOC: HCPCS | Performed by: INTERNAL MEDICINE

## 2024-02-22 PROCEDURE — G8484 FLU IMMUNIZE NO ADMIN: HCPCS | Performed by: INTERNAL MEDICINE

## 2024-02-22 RX ORDER — AZITHROMYCIN 250 MG/1
TABLET, FILM COATED ORAL
Qty: 6 TABLET | Refills: 0 | Status: SHIPPED | OUTPATIENT
Start: 2024-02-22 | End: 2024-03-03

## 2024-02-22 RX ORDER — PREDNISONE 20 MG/1
20 TABLET ORAL DAILY
Qty: 5 TABLET | Refills: 0 | Status: SHIPPED | OUTPATIENT
Start: 2024-02-22 | End: 2024-02-27

## 2024-02-22 RX ORDER — CETIRIZINE HYDROCHLORIDE 10 MG/1
10 TABLET ORAL DAILY
Qty: 30 TABLET | Refills: 3 | Status: SHIPPED | OUTPATIENT
Start: 2024-02-22

## 2024-02-22 ASSESSMENT — PULMONARY FUNCTION TESTS
FEV1/FVC_POST: 82
FVC_PREDICTED: 2.31
FEV1_POST: 0.92
FVC_PERCENT_PREDICTED_POST: 48
FEV1/FVC_PREDICTED: 79
FVC_PERCENT_PREDICTED_PRE: 46
FEV1_PERCENT_PREDICTED_PRE: 48
FEV1_PERCENT_PREDICTED_POST: 50
FEV1_PRE: 0.88
FEV1/FVC_PERCENT_PREDICTED_PRE: 103
FEV1/FVC_PERCENT_PREDICTED_POST: 104
FVC_POST: 1.12
FEV1_PREDICTED: 1.81
FEV1/FVC_PRE: 82
FVC_PRE: 1.08

## 2024-02-22 NOTE — PATIENT INSTRUCTIONS
Cindy Sandoval    [facility]  [address] [place] [id number]  Office: [phone]      Your were seen in the office today for Hospital Follow Up       We  did make changes to your medications today.   Start prednisone 20mg daily  Start azithromycin as directed on package  Start cetirizine    Testing ordered today was CT scan of the chest      Vaccines recommended none      Oxygen therapy started    Referral made to internal medicine office    Please do not hesitate to call the office with any questions.

## 2024-02-22 NOTE — PROGRESS NOTES
Patient had an ambulatory pulse ox done during office visit.  Patient oxygen level dropped to 84% on room air, oxygen applied on 1 liters with no result, oxygen increased to 2 liters and O2 sat increased to 87-90%, oxygen increased to 3 liters and O2 sat increased to 90-92% and  held at that level.

## 2024-02-27 NOTE — PROGRESS NOTES
[facility]YSIC[facility]ANS [facility]     HISTORY OF PRESENT ILLNESS:    Estefani Chiu is a 68 y.o. year old female here for evaluation of acute hypoxemic resp failure, hospital follow up. The patient was admitted due to increasing shortness of breath, at that time she was noted to have significant hilar adenopathy, infiltrates, and was treated for a COPD exacerbation. She is accompanied to todays visit by her daughter. She reports that she had initially felt better when she was discharged but not has again been having increased shortness of breath. She is also have a cough productive of lots of phlegm. On arrival to the clinic today her O2 sat was 84%. She has not follow up with a physician since her discharge and does not currently have a pcp. She is a former smoker, currently she works at [employer] in the kitchen and reports that she does feel more short of breath while at work.       ALLERGIES:    Allergies   Allergen Reactions    Penicillins Swelling     Swelling hives, emesis       PAST MEDICAL HISTORY: No past medical history on file.    MEDICATIONS:   Current Outpatient Medications   Medication Sig Dispense Refill    predniSONE (DELTASONE) 20 MG tablet Take 1 tablet by mouth daily for 5 days 5 tablet 0    azithromycin (ZITHROMAX) 250 MG tablet 500mg on day 1 followed by 250mg on days 2 - 5 6 tablet 0    cetirizine (ZYRTEC) 10 MG tablet Take 1 tablet by mouth daily 30 tablet 3    ipratropium 0.5 mg-albuterol 2.5 mg (DUONEB) 0.5-2.5 (3) MG/3ML SOLN nebulizer solution Inhale 3 mLs into the lungs every 6 hours as needed for Shortness of Breath 360 mL 1    Budeson-Glycopyrrol-Formoterol 160-9-4.8 MCG/ACT AERO Inhale 2 puffs into the lungs in the morning and 2 puffs in the evening. 1 each 3    albuterol sulfate HFA (VENTOLIN HFA) 108 (90 Base) MCG/ACT inhaler Inhale 2 puffs into the lungs 4 times daily as needed for Wheezing 18 g 5    Respiratory Therapy Supplies (FULL KIT NEBULIZER

## 2024-03-06 ENCOUNTER — HOSPITAL ENCOUNTER (OUTPATIENT)
Age: 69
Discharge: HOME | End: 2024-03-06
Payer: MEDICAID

## 2024-03-06 ENCOUNTER — OFFICE VISIT (OUTPATIENT)
Dept: INTERNAL MEDICINE | Age: 69
End: 2024-03-06
Payer: MEDICAID

## 2024-03-06 VITALS
HEART RATE: 91 BPM | BODY MASS INDEX: 32.2 KG/M2 | OXYGEN SATURATION: 94 % | RESPIRATION RATE: 18 BRPM | WEIGHT: 175 LBS | DIASTOLIC BLOOD PRESSURE: 92 MMHG | HEIGHT: 62 IN | SYSTOLIC BLOOD PRESSURE: 151 MMHG | TEMPERATURE: 97.2 F

## 2024-03-06 DIAGNOSIS — E11.9 TYPE 2 DIABETES MELLITUS WITHOUT COMPLICATION, WITHOUT LONG-TERM CURRENT USE OF INSULIN (HCC): ICD-10-CM

## 2024-03-06 DIAGNOSIS — R76.8 ELEVATED IGE LEVEL: ICD-10-CM

## 2024-03-06 DIAGNOSIS — J32.9 SINUSITIS, UNSPECIFIED CHRONICITY, UNSPECIFIED LOCATION: ICD-10-CM

## 2024-03-06 DIAGNOSIS — K76.9 LIVER LESION: ICD-10-CM

## 2024-03-06 DIAGNOSIS — R59.0 LYMPHADENOPATHY, MEDIASTINAL: ICD-10-CM

## 2024-03-06 DIAGNOSIS — I10 PRIMARY HYPERTENSION: ICD-10-CM

## 2024-03-06 DIAGNOSIS — Z12.12 SCREENING FOR COLORECTAL CANCER: ICD-10-CM

## 2024-03-06 DIAGNOSIS — E55.9 VITAMIN D DEFICIENCY: ICD-10-CM

## 2024-03-06 DIAGNOSIS — Z12.31 ENCOUNTER FOR SCREENING MAMMOGRAM FOR MALIGNANT NEOPLASM OF BREAST: ICD-10-CM

## 2024-03-06 DIAGNOSIS — E78.5 HYPERLIPIDEMIA, UNSPECIFIED HYPERLIPIDEMIA TYPE: ICD-10-CM

## 2024-03-06 DIAGNOSIS — E66.09 CLASS 1 OBESITY DUE TO EXCESS CALORIES WITH SERIOUS COMORBIDITY AND BODY MASS INDEX (BMI) OF 32.0 TO 32.9 IN ADULT: ICD-10-CM

## 2024-03-06 DIAGNOSIS — Z12.11 SCREENING FOR COLORECTAL CANCER: ICD-10-CM

## 2024-03-06 DIAGNOSIS — R59.0 LYMPHADENOPATHY, HILAR: ICD-10-CM

## 2024-03-06 DIAGNOSIS — R16.0 LIVER MASS: ICD-10-CM

## 2024-03-06 DIAGNOSIS — J96.01 ACUTE RESPIRATORY FAILURE WITH HYPOXIA (HCC): Primary | ICD-10-CM

## 2024-03-06 PROBLEM — E66.811 CLASS 1 OBESITY DUE TO EXCESS CALORIES WITH SERIOUS COMORBIDITY AND BODY MASS INDEX (BMI) OF 32.0 TO 32.9 IN ADULT: Status: ACTIVE | Noted: 2024-03-06

## 2024-03-06 LAB
CHOLEST SERPL-MCNC: 248 MG/DL
HBA1C MFR BLD: 6.9 % (ref 4–5.6)
HDLC SERPL-MCNC: 51 MG/DL
LDLC SERPL CALC-MCNC: 171 MG/DL
MICROALBUMIN UR-MCNC: <12 MG/L (ref 0–19)
TRIGL SERPL-MCNC: 128 MG/DL
VLDLC SERPL CALC-MCNC: 26 MG/DL

## 2024-03-06 PROCEDURE — 99204 OFFICE O/P NEW MOD 45 MIN: CPT

## 2024-03-06 PROCEDURE — 99212 OFFICE O/P EST SF 10 MIN: CPT

## 2024-03-06 PROCEDURE — 3077F SYST BP >= 140 MM HG: CPT

## 2024-03-06 PROCEDURE — 80061 LIPID PANEL: CPT

## 2024-03-06 PROCEDURE — 1090F PRES/ABSN URINE INCON ASSESS: CPT

## 2024-03-06 PROCEDURE — 36415 COLL VENOUS BLD VENIPUNCTURE: CPT

## 2024-03-06 PROCEDURE — 1123F ACP DISCUSS/DSCN MKR DOCD: CPT

## 2024-03-06 PROCEDURE — G8400 PT W/DXA NO RESULTS DOC: HCPCS

## 2024-03-06 PROCEDURE — 1036F TOBACCO NON-USER: CPT

## 2024-03-06 PROCEDURE — G8417 CALC BMI ABV UP PARAM F/U: HCPCS

## 2024-03-06 PROCEDURE — 3079F DIAST BP 80-89 MM HG: CPT

## 2024-03-06 PROCEDURE — 3044F HG A1C LEVEL LT 7.0%: CPT

## 2024-03-06 PROCEDURE — 2022F DILAT RTA XM EVC RTNOPTHY: CPT

## 2024-03-06 PROCEDURE — 82043 UR ALBUMIN QUANTITATIVE: CPT

## 2024-03-06 PROCEDURE — 83036 HEMOGLOBIN GLYCOSYLATED A1C: CPT

## 2024-03-06 PROCEDURE — G8484 FLU IMMUNIZE NO ADMIN: HCPCS

## 2024-03-06 PROCEDURE — 3017F COLORECTAL CA SCREEN DOC REV: CPT

## 2024-03-06 PROCEDURE — G8427 DOCREV CUR MEDS BY ELIG CLIN: HCPCS

## 2024-03-06 RX ORDER — FLUTICASONE PROPIONATE 50 MCG
1 SPRAY, SUSPENSION (ML) NASAL DAILY
Qty: 16 G | Refills: 2 | Status: SHIPPED | OUTPATIENT
Start: 2024-03-06

## 2024-03-06 RX ORDER — ATORVASTATIN CALCIUM 20 MG/1
20 TABLET, FILM COATED ORAL DAILY
Qty: 90 TABLET | Refills: 0 | Status: SHIPPED | OUTPATIENT
Start: 2024-03-06

## 2024-03-06 SDOH — ECONOMIC STABILITY: HOUSING INSECURITY
IN THE LAST 12 MONTHS, WAS THERE A TIME WHEN YOU DID NOT HAVE A STEADY PLACE TO SLEEP OR SLEPT IN A SHELTER (INCLUDING NOW)?: NO

## 2024-03-06 SDOH — ECONOMIC STABILITY: FOOD INSECURITY: WITHIN THE PAST 12 MONTHS, YOU WORRIED THAT YOUR FOOD WOULD RUN OUT BEFORE YOU GOT MONEY TO BUY MORE.: NEVER TRUE

## 2024-03-06 SDOH — ECONOMIC STABILITY: INCOME INSECURITY: HOW HARD IS IT FOR YOU TO PAY FOR THE VERY BASICS LIKE FOOD, HOUSING, MEDICAL CARE, AND HEATING?: NOT VERY HARD

## 2024-03-06 SDOH — ECONOMIC STABILITY: FOOD INSECURITY: WITHIN THE PAST 12 MONTHS, THE FOOD YOU BOUGHT JUST DIDN'T LAST AND YOU DIDN'T HAVE MONEY TO GET MORE.: NEVER TRUE

## 2024-03-06 ASSESSMENT — PATIENT HEALTH QUESTIONNAIRE - PHQ9
SUM OF ALL RESPONSES TO PHQ9 QUESTIONS 1 & 2: 0
SUM OF ALL RESPONSES TO PHQ QUESTIONS 1-9: 0
SUM OF ALL RESPONSES TO PHQ QUESTIONS 1-9: 0
1. LITTLE INTEREST OR PLEASURE IN DOING THINGS: 0
SUM OF ALL RESPONSES TO PHQ QUESTIONS 1-9: 0
2. FEELING DOWN, DEPRESSED OR HOPELESS: 0
SUM OF ALL RESPONSES TO PHQ QUESTIONS 1-9: 0

## 2024-03-06 ASSESSMENT — ENCOUNTER SYMPTOMS
CHEST TIGHTNESS: 0
NAUSEA: 0
SHORTNESS OF BREATH: 0
ABDOMINAL PAIN: 0
VOICE CHANGE: 0
COUGH: 0
RHINORRHEA: 0
VOMITING: 0

## 2024-03-06 NOTE — PROGRESS NOTES
[facility] - [facility] Internal Medicine      SUBJECTIVE:  Estefani Chiu (:  1955) is a 68 y.o. female here for evaluation of the following chief complaint(s):  New Patient (Referred by pulmonary - pt had pneumonia in NOv and now constant sinusitis, states she does see dr calzada  , but no relief ), Foot Pain (Left foot area tender in center - area is red not open), Numbness (In hands per pt she was dx with carpal tunnel  and has not had any surgeries and feels the numbness is getting worse), Hypertension, and Diabetes    Patient presented to the internal medicine clinic to establish care and PCP.  Patient was referred from pulmonology clinic.  Patient used to follow-up with Dr. Quiroz previously and has not been following up with any provider since last 7 to 8 years.  Patient presented to the ED on 2023 with complaints of dyspnea on exertion and was treated with doxycycline, prednisone for possible pneumonia.  On 2023 the patient again presented to the hospital with similar complaints of shortness of breath and was found to have bilateral interstitial and airspace opacities in the lung and severe prominent mediastinal and hilar lymph nodes.  Pulmonology was consulted during the hospital admission.  Patient was treated symptomatically and was discharged home on oxygen, inhalers and prednisone.  Patient then followed in pulmonary clinic on 2024.  Her saturation at the clinic was 84% on room air so was prescribed with oxygen concentrator which was not delivered to now.     On presentation to the clinic today the patient had no new symptoms.  She stated that her shortness of breath and cough has improved and now she is able to walk for 2-3 blocks, climb flights of stairs without any difficulty.  Oxygen saturation in clinic was 94% on room air.  Patient had past medical history of hypertension, diabetes, hyperlipidemia and stroke in .  Patient did stop taking all of 
recheck. Echocardiogram is pending. Encourage continued complete cessation of tobacco. Continue follow up with pulmonary.     Remainder of medical problems as per resident note.    Reginaldo Monreal, DO  3/6/24

## 2024-03-06 NOTE — PATIENT INSTRUCTIONS
Thank you for coming to your follow up appointment   Please take your medications as directed and keep your follow up appointment in 1 month.  Call our office if you have any questions or concerns at [phone]  Have blood work, urine test and CT chest and abdomen done prior to the next appointment.     Follow up with Pulmonology as scheduled.    Maxwell Garcia MD

## 2024-03-07 ENCOUNTER — TELEPHONE (OUTPATIENT)
Dept: INTERNAL MEDICINE | Age: 69
End: 2024-03-07

## 2024-03-07 LAB — VITAMIN D 25-HYDROXY: 9.7 NG/ML (ref 30–100)

## 2024-03-07 NOTE — TELEPHONE ENCOUNTER
Notified patient of appointment reminder for CTA Triphasic Liver on 3-14-24. Patient advised to be NPO for 3 hrs prior and to stop Metformin the day of test and 48 hrs after test. Patient also advised to please have CMP done before test. Patient advised, verbalized understanding.

## 2024-03-12 ENCOUNTER — TELEPHONE (OUTPATIENT)
Dept: SURGERY | Age: 69
End: 2024-03-12

## 2024-03-12 NOTE — TELEPHONE ENCOUNTER
MA spoke with pt in regards to scheduling colonoscopy consult per referral. Pt verbalized she was not interested in scheduling appt at this time.     Electronically signed by Rosalia Silva MA on 3/12/2024 at 10:57 AM

## 2024-03-13 ENCOUNTER — HOSPITAL ENCOUNTER (OUTPATIENT)
Age: 69
Discharge: HOME | End: 2024-03-13
Payer: COMMERCIAL

## 2024-03-13 DIAGNOSIS — R16.0 LIVER MASS: ICD-10-CM

## 2024-03-13 DIAGNOSIS — I10 PRIMARY HYPERTENSION: ICD-10-CM

## 2024-03-13 DIAGNOSIS — K76.9 LIVER LESION: ICD-10-CM

## 2024-03-13 LAB
ALBUMIN SERPL-MCNC: 4.1 G/DL (ref 3.5–5.2)
ALP SERPL-CCNC: 94 U/L (ref 35–104)
ALT SERPL-CCNC: 9 U/L (ref 0–32)
ANION GAP SERPL CALCULATED.3IONS-SCNC: 14 MMOL/L (ref 7–16)
AST SERPL-CCNC: 12 U/L (ref 0–31)
BILIRUB SERPL-MCNC: 0.4 MG/DL (ref 0–1.2)
BUN SERPL-MCNC: 9 MG/DL (ref 6–23)
CALCIUM SERPL-MCNC: 9.2 MG/DL (ref 8.6–10.2)
CHLORIDE SERPL-SCNC: 104 MMOL/L (ref 98–107)
CO2 SERPL-SCNC: 23 MMOL/L (ref 22–29)
CREAT SERPL-MCNC: 0.6 MG/DL (ref 0.5–1)
GFR SERPL CREATININE-BSD FRML MDRD: >60 ML/MIN/1.73M2
GLUCOSE SERPL-MCNC: 106 MG/DL (ref 74–99)
POTASSIUM SERPL-SCNC: 4.2 MMOL/L (ref 3.5–5)
PROT SERPL-MCNC: 7.6 G/DL (ref 6.4–8.3)
SODIUM SERPL-SCNC: 141 MMOL/L (ref 132–146)

## 2024-03-13 PROCEDURE — 80053 COMPREHEN METABOLIC PANEL: CPT

## 2024-03-13 PROCEDURE — 36415 COLL VENOUS BLD VENIPUNCTURE: CPT

## 2024-03-14 ENCOUNTER — HOSPITAL ENCOUNTER (OUTPATIENT)
Dept: CT IMAGING | Age: 69
Discharge: HOME | End: 2024-03-16
Payer: COMMERCIAL

## 2024-03-14 DIAGNOSIS — R16.0 LIVER MASS: ICD-10-CM

## 2024-03-14 PROCEDURE — 74175 CTA ABDOMEN W/CONTRAST: CPT

## 2024-03-14 PROCEDURE — 6360000004 HC RX CONTRAST MEDICATION: Performed by: RADIOLOGY

## 2024-03-14 RX ADMIN — IOPAMIDOL 90 ML: 755 INJECTION, SOLUTION INTRAVENOUS at 12:40

## 2024-03-28 DIAGNOSIS — J44.9 CHRONIC OBSTRUCTIVE PULMONARY DISEASE, UNSPECIFIED COPD TYPE (HCC): Primary | ICD-10-CM

## 2024-04-05 ENCOUNTER — HOSPITAL ENCOUNTER (EMERGENCY)
Age: 69
Discharge: HOME | End: 2024-04-06
Attending: STUDENT IN AN ORGANIZED HEALTH CARE EDUCATION/TRAINING PROGRAM
Payer: COMMERCIAL

## 2024-04-05 ENCOUNTER — APPOINTMENT (OUTPATIENT)
Dept: GENERAL RADIOLOGY | Age: 69
End: 2024-04-05
Payer: COMMERCIAL

## 2024-04-05 DIAGNOSIS — R06.89 DYSPNEA AND RESPIRATORY ABNORMALITIES: ICD-10-CM

## 2024-04-05 DIAGNOSIS — R06.00 DYSPNEA AND RESPIRATORY ABNORMALITIES: ICD-10-CM

## 2024-04-05 DIAGNOSIS — J44.1 COPD EXACERBATION (HCC): ICD-10-CM

## 2024-04-05 DIAGNOSIS — J18.9 PNEUMONIA OF BOTH LOWER LOBES DUE TO INFECTIOUS ORGANISM: Primary | ICD-10-CM

## 2024-04-05 LAB
ALBUMIN SERPL-MCNC: 4 G/DL (ref 3.5–5.2)
ALP SERPL-CCNC: 79 U/L (ref 35–104)
ALT SERPL-CCNC: 9 U/L (ref 0–32)
ANION GAP SERPL CALCULATED.3IONS-SCNC: 9 MMOL/L (ref 7–16)
AST SERPL-CCNC: 13 U/L (ref 0–31)
B PARAP IS1001 DNA NPH QL NAA+NON-PROBE: NOT DETECTED
B PERT DNA SPEC QL NAA+PROBE: NOT DETECTED
BASOPHILS # BLD: 0.07 K/UL (ref 0–0.2)
BASOPHILS NFR BLD: 1 % (ref 0–2)
BILIRUB SERPL-MCNC: 0.4 MG/DL (ref 0–1.2)
BNP SERPL-MCNC: 47 PG/ML (ref 0–125)
BUN SERPL-MCNC: 6 MG/DL (ref 6–23)
C PNEUM DNA NPH QL NAA+NON-PROBE: NOT DETECTED
CALCIUM SERPL-MCNC: 9.4 MG/DL (ref 8.6–10.2)
CHLORIDE SERPL-SCNC: 98 MMOL/L (ref 98–107)
CO2 SERPL-SCNC: 32 MMOL/L (ref 22–29)
CREAT SERPL-MCNC: 0.5 MG/DL (ref 0.5–1)
EKG ATRIAL RATE: 78 BPM
EKG P AXIS: 78 DEGREES
EKG P-R INTERVAL: 188 MS
EKG Q-T INTERVAL: 386 MS
EKG QRS DURATION: 70 MS
EKG QTC CALCULATION (BAZETT): 440 MS
EKG R AXIS: 97 DEGREES
EKG T AXIS: 73 DEGREES
EKG VENTRICULAR RATE: 78 BPM
EOSINOPHIL # BLD: 0.87 K/UL (ref 0.05–0.5)
EOSINOPHILS RELATIVE PERCENT: 12 % (ref 0–6)
ERYTHROCYTE [DISTWIDTH] IN BLOOD BY AUTOMATED COUNT: 13.8 % (ref 11.5–15)
FLUAV RNA NPH QL NAA+NON-PROBE: NOT DETECTED
FLUBV RNA NPH QL NAA+NON-PROBE: NOT DETECTED
GFR SERPL CREATININE-BSD FRML MDRD: >90 ML/MIN/1.73M2
GLUCOSE SERPL-MCNC: 104 MG/DL (ref 74–99)
HADV DNA NPH QL NAA+NON-PROBE: NOT DETECTED
HCOV 229E RNA NPH QL NAA+NON-PROBE: NOT DETECTED
HCOV HKU1 RNA NPH QL NAA+NON-PROBE: NOT DETECTED
HCOV NL63 RNA NPH QL NAA+NON-PROBE: NOT DETECTED
HCOV OC43 RNA NPH QL NAA+NON-PROBE: NOT DETECTED
HCT VFR BLD AUTO: 45.1 % (ref 34–48)
HGB BLD-MCNC: 14.3 G/DL (ref 11.5–15.5)
HMPV RNA NPH QL NAA+NON-PROBE: NOT DETECTED
HPIV1 RNA NPH QL NAA+NON-PROBE: NOT DETECTED
HPIV2 RNA NPH QL NAA+NON-PROBE: NOT DETECTED
HPIV3 RNA NPH QL NAA+NON-PROBE: NOT DETECTED
HPIV4 RNA NPH QL NAA+NON-PROBE: NOT DETECTED
IMM GRANULOCYTES # BLD AUTO: <0.03 K/UL (ref 0–0.58)
IMM GRANULOCYTES NFR BLD: 0 % (ref 0–5)
INR PPP: 1.3
LYMPHOCYTES NFR BLD: 2.81 K/UL (ref 1.5–4)
LYMPHOCYTES RELATIVE PERCENT: 38 % (ref 20–42)
M PNEUMO DNA NPH QL NAA+NON-PROBE: NOT DETECTED
MAGNESIUM SERPL-MCNC: 1.9 MG/DL (ref 1.6–2.6)
MCH RBC QN AUTO: 30 PG (ref 26–35)
MCHC RBC AUTO-ENTMCNC: 31.7 G/DL (ref 32–34.5)
MCV RBC AUTO: 94.5 FL (ref 80–99.9)
MONOCYTES NFR BLD: 0.52 K/UL (ref 0.1–0.95)
MONOCYTES NFR BLD: 7 % (ref 2–12)
NEUTROPHILS NFR BLD: 42 % (ref 43–80)
NEUTS SEG NFR BLD: 3.13 K/UL (ref 1.8–7.3)
PLATELET # BLD AUTO: 290 K/UL (ref 130–450)
PMV BLD AUTO: 9 FL (ref 7–12)
POTASSIUM SERPL-SCNC: 4.1 MMOL/L (ref 3.5–5)
PROT SERPL-MCNC: 8.8 G/DL (ref 6.4–8.3)
PROTHROMBIN TIME: 14.2 SEC (ref 9.3–12.4)
RBC # BLD AUTO: 4.77 M/UL (ref 3.5–5.5)
RSV RNA NPH QL NAA+NON-PROBE: NOT DETECTED
RV+EV RNA NPH QL NAA+NON-PROBE: NOT DETECTED
SARS-COV-2 RNA NPH QL NAA+NON-PROBE: NOT DETECTED
SODIUM SERPL-SCNC: 139 MMOL/L (ref 132–146)
SPECIMEN DESCRIPTION: NORMAL
TROPONIN I SERPL HS-MCNC: 7 NG/L (ref 0–9)
WBC OTHER # BLD: 7.4 K/UL (ref 4.5–11.5)

## 2024-04-05 PROCEDURE — 96374 THER/PROPH/DIAG INJ IV PUSH: CPT

## 2024-04-05 PROCEDURE — 2580000003 HC RX 258: Performed by: STUDENT IN AN ORGANIZED HEALTH CARE EDUCATION/TRAINING PROGRAM

## 2024-04-05 PROCEDURE — 71046 X-RAY EXAM CHEST 2 VIEWS: CPT

## 2024-04-05 PROCEDURE — 6360000002 HC RX W HCPCS: Performed by: STUDENT IN AN ORGANIZED HEALTH CARE EDUCATION/TRAINING PROGRAM

## 2024-04-05 PROCEDURE — 85610 PROTHROMBIN TIME: CPT

## 2024-04-05 PROCEDURE — 83880 ASSAY OF NATRIURETIC PEPTIDE: CPT

## 2024-04-05 PROCEDURE — 80053 COMPREHEN METABOLIC PANEL: CPT

## 2024-04-05 PROCEDURE — 84145 PROCALCITONIN (PCT): CPT

## 2024-04-05 PROCEDURE — 6370000000 HC RX 637 (ALT 250 FOR IP): Performed by: STUDENT IN AN ORGANIZED HEALTH CARE EDUCATION/TRAINING PROGRAM

## 2024-04-05 PROCEDURE — 83735 ASSAY OF MAGNESIUM: CPT

## 2024-04-05 PROCEDURE — 94640 AIRWAY INHALATION TREATMENT: CPT

## 2024-04-05 PROCEDURE — 99285 EMERGENCY DEPT VISIT HI MDM: CPT

## 2024-04-05 PROCEDURE — 0202U NFCT DS 22 TRGT SARS-COV-2: CPT

## 2024-04-05 PROCEDURE — 85025 COMPLETE CBC W/AUTO DIFF WBC: CPT

## 2024-04-05 PROCEDURE — 84484 ASSAY OF TROPONIN QUANT: CPT

## 2024-04-05 RX ORDER — CEFDINIR 300 MG/1
300 CAPSULE ORAL ONCE
Status: COMPLETED | OUTPATIENT
Start: 2024-04-05 | End: 2024-04-06

## 2024-04-05 RX ORDER — DOXYCYCLINE HYCLATE 100 MG
100 TABLET ORAL 2 TIMES DAILY
Qty: 20 TABLET | Refills: 0 | Status: SHIPPED | OUTPATIENT
Start: 2024-04-05 | End: 2024-04-15

## 2024-04-05 RX ORDER — IPRATROPIUM BROMIDE AND ALBUTEROL SULFATE 2.5; .5 MG/3ML; MG/3ML
3 SOLUTION RESPIRATORY (INHALATION) ONCE
Status: COMPLETED | OUTPATIENT
Start: 2024-04-05 | End: 2024-04-05

## 2024-04-05 RX ORDER — PREDNISONE 20 MG/1
20 TABLET ORAL 2 TIMES DAILY
Qty: 10 TABLET | Refills: 0 | Status: SHIPPED | OUTPATIENT
Start: 2024-04-06 | End: 2024-04-10

## 2024-04-05 RX ORDER — BENZONATATE 100 MG/1
100 CAPSULE ORAL ONCE
Status: COMPLETED | OUTPATIENT
Start: 2024-04-05 | End: 2024-04-05

## 2024-04-05 RX ORDER — CEFDINIR 300 MG/1
300 CAPSULE ORAL 2 TIMES DAILY
Qty: 20 CAPSULE | Refills: 0 | Status: SHIPPED | OUTPATIENT
Start: 2024-04-05 | End: 2024-04-15

## 2024-04-05 RX ORDER — GUAIFENESIN/DEXTROMETHORPHAN 100-10MG/5
10 SYRUP ORAL ONCE
Status: COMPLETED | OUTPATIENT
Start: 2024-04-05 | End: 2024-04-05

## 2024-04-05 RX ORDER — DOXYCYCLINE HYCLATE 100 MG/1
100 CAPSULE ORAL ONCE
Status: COMPLETED | OUTPATIENT
Start: 2024-04-05 | End: 2024-04-06

## 2024-04-05 RX ADMIN — GUAIFENESIN SYRUP AND DEXTROMETHORPHAN 10 ML: 100; 10 SYRUP ORAL at 19:43

## 2024-04-05 RX ADMIN — WATER 125 MG: 1 INJECTION INTRAMUSCULAR; INTRAVENOUS; SUBCUTANEOUS at 19:45

## 2024-04-05 RX ADMIN — BENZONATATE 100 MG: 100 CAPSULE ORAL at 19:46

## 2024-04-05 RX ADMIN — IPRATROPIUM BROMIDE AND ALBUTEROL SULFATE 3 DOSE: 2.5; .5 SOLUTION RESPIRATORY (INHALATION) at 20:44

## 2024-04-06 VITALS
DIASTOLIC BLOOD PRESSURE: 84 MMHG | RESPIRATION RATE: 18 BRPM | SYSTOLIC BLOOD PRESSURE: 152 MMHG | OXYGEN SATURATION: 94 % | TEMPERATURE: 98 F | HEART RATE: 84 BPM

## 2024-04-06 LAB — PROCALCITONIN SERPL-MCNC: 0.06 NG/ML (ref 0–0.08)

## 2024-04-06 PROCEDURE — 6370000000 HC RX 637 (ALT 250 FOR IP): Performed by: STUDENT IN AN ORGANIZED HEALTH CARE EDUCATION/TRAINING PROGRAM

## 2024-04-06 RX ADMIN — CEFDINIR 300 MG: 300 CAPSULE ORAL at 00:36

## 2024-04-06 RX ADMIN — DOXYCYCLINE HYCLATE 100 MG: 100 CAPSULE ORAL at 00:36

## 2024-04-06 ASSESSMENT — ENCOUNTER SYMPTOMS
SHORTNESS OF BREATH: 1
NAUSEA: 0
DIARRHEA: 0
CONSTIPATION: 0
ABDOMINAL PAIN: 0
COUGH: 1
VOMITING: 0

## 2024-04-06 NOTE — DISCHARGE INSTRUCTIONS
Please return to the ER for any new or worsening symptoms including but not limited to Fever or Chest pain or difficulty breathing  If prescribed, please be sure to  your prescriptions from the pharmacy  Please follow-up with Primary care provider and PULMONOLOGY  as instructed

## 2024-04-06 NOTE — ED PROVIDER NOTES
[facility] EMERGENCY DEPARTMENT  EMERGENCY DEPARTMENT ENCOUNTER        Pt Name: Estefani Chiu  MRN: [id number]  Birthdate 1955  Date of evaluation: 4/5/2024  Provider: Eva Akins DO  PCP: Maxwell Garcia MD  Note Started: 9:21 PM EDT 4/5/24    CHIEF COMPLAINT       Chief Complaint   Patient presents with    Shortness of Breath     Patient has been SOB for the past week. Patient takes breathing treatments with no relief. Patient was diagnosed with pneumonia in feb and hasn't gotten any better       HISTORY OF PRESENT ILLNESS: 1 or more Elements     Limitations to history : None    Estefani Chiu is a 68-year-old female with past medical history of COPD, chronically on 3 L NC O2 at baseline, follows with pulmonology Dr. Hughes, who presents to the ED for evaluation of shortness of breath and wheezing.  Patient states her symptoms been worsening over the past week.  She reports cough productive for green sputum.  She denies any fevers or chills.  She has not had any chest pain or palpitations.  Reports dyspnea with coughing and with exertion.  When asked what her symptoms are like today, she states \"I have this all the time, it is the same as usual\"; she does state that she is using her home nebulizers but they did not seem to help her symptoms.  Patient denies any recent antibiotic use.  States few days this week she did have nonbloody nonblack diarrhea but states that is now resolved.  She has not had any abdominal pain.  Denies any nausea or vomiting.      Nursing Notes were all reviewed and agreed with or any disagreements were addressed in the HPI.      REVIEW OF EXTERNAL NOTE :       Reviewed office visit with internal medicine on 3/6/2024.    Chart Review/External Note Review    Last Echo reviewed by Me:  No results found for: \"LVEF\", \"LVEFMODE\"        Controlled Substance Monitoring:    Acute and Chronic Pain Monitoring:        No data to display

## 2024-04-09 LAB
EKG ATRIAL RATE: 78 BPM
EKG P AXIS: 78 DEGREES
EKG P-R INTERVAL: 188 MS
EKG Q-T INTERVAL: 386 MS
EKG QRS DURATION: 70 MS
EKG QTC CALCULATION (BAZETT): 440 MS
EKG R AXIS: 97 DEGREES
EKG T AXIS: 73 DEGREES
EKG VENTRICULAR RATE: 78 BPM

## 2024-04-09 NOTE — PROGRESS NOTES
[facility]  Internal Medicine Residency Program  ACC Note      SUBJECTIVE:  CC: had concerns including Follow-up.    PCP: Maxwell Garcia MD      HPI:  Estefani Chiu is a 68 y.o.female  presenting to [facility] for follow up. Patient has history of sinusinitis which she uses Flonase which helps a little, and its not as bad as it was. Patient shortness of breath has improved. Patient has not used her oxygen for a day and half. She has some fatigue when she's walking but says it is getting better. Patient is able to walk 1/2 a block without oxygen, and takes her time to climb stairs but is able to. Oxygen saturation today 95% on room air. Patient says she has lost some weight recently her appetite is still good. Denies night sweats, fevers, fatigue. Will continue to monitor.     Type 2 diabetes mellitus  - A1c -(3/6/24) 6.9   - creatinine 0.5, BUN 6  - Meds: Metformin 500 BID   - doesn't check sugars at home   Goal :   Fasting glucose 80 to 130 mg/dL  Postprandial glucose (90 to 120 minutes after a meal) less than 180 mg/d    Hypertension   -Current regimen - amlodipine 5 mg,   -BP- doesn't check BP at home,  BP today 142/82  Continue current medications  Start HCTZ 12.5 daily     COPD   -follows with pulmonology outpatient   - continue breztri and sin.      Hx of acute hypoxemic respiratory insufficiency  Hilar and mediastinal adenopathy   -pfts   -ct chest mid April     5 cm cavernous hemangioma in the right hepatic lobe.  2. 7 mm flash fill hemangioma in the right hepatic lobe.  -repeat CTA triphasic liver 3-6 months ~ May/June     HCM:   Will address at next visit.     Review of Systems   HENT:  Positive for postnasal drip.    Respiratory:  Negative for shortness of breath.    Cardiovascular:  Negative for chest pain and leg swelling.   Gastrointestinal:  Negative for abdominal pain.   Neurological:  Negative for dizziness, weakness, light-headedness and headaches.       OBJECTIVE:    VS:

## 2024-04-10 ENCOUNTER — OFFICE VISIT (OUTPATIENT)
Dept: INTERNAL MEDICINE | Age: 69
End: 2024-04-10
Payer: COMMERCIAL

## 2024-04-10 VITALS
HEIGHT: 62 IN | SYSTOLIC BLOOD PRESSURE: 142 MMHG | DIASTOLIC BLOOD PRESSURE: 82 MMHG | BODY MASS INDEX: 31.3 KG/M2 | WEIGHT: 170.1 LBS | OXYGEN SATURATION: 95 % | TEMPERATURE: 98.3 F | RESPIRATION RATE: 18 BRPM | HEART RATE: 70 BPM

## 2024-04-10 DIAGNOSIS — I10 PRIMARY HYPERTENSION: Primary | ICD-10-CM

## 2024-04-10 DIAGNOSIS — I10 PRIMARY HYPERTENSION: ICD-10-CM

## 2024-04-10 PROCEDURE — G8400 PT W/DXA NO RESULTS DOC: HCPCS

## 2024-04-10 PROCEDURE — 1090F PRES/ABSN URINE INCON ASSESS: CPT

## 2024-04-10 PROCEDURE — G8427 DOCREV CUR MEDS BY ELIG CLIN: HCPCS

## 2024-04-10 PROCEDURE — 99214 OFFICE O/P EST MOD 30 MIN: CPT

## 2024-04-10 PROCEDURE — 3078F DIAST BP <80 MM HG: CPT | Performed by: INTERNAL MEDICINE

## 2024-04-10 PROCEDURE — 3077F SYST BP >= 140 MM HG: CPT

## 2024-04-10 PROCEDURE — 3017F COLORECTAL CA SCREEN DOC REV: CPT | Performed by: INTERNAL MEDICINE

## 2024-04-10 PROCEDURE — 99212 OFFICE O/P EST SF 10 MIN: CPT

## 2024-04-10 PROCEDURE — 1123F ACP DISCUSS/DSCN MKR DOCD: CPT

## 2024-04-10 PROCEDURE — G8417 CALC BMI ABV UP PARAM F/U: HCPCS

## 2024-04-10 PROCEDURE — 1036F TOBACCO NON-USER: CPT | Performed by: INTERNAL MEDICINE

## 2024-04-10 RX ORDER — HYDROCHLOROTHIAZIDE 25 MG/1
12.5 TABLET ORAL EVERY MORNING
Qty: 30 TABLET | Refills: 0 | Status: SHIPPED | OUTPATIENT
Start: 2024-04-10

## 2024-04-10 RX ORDER — HYDROCHLOROTHIAZIDE 25 MG/1
TABLET ORAL
Qty: 45 TABLET | OUTPATIENT
Start: 2024-04-10

## 2024-04-10 ASSESSMENT — ENCOUNTER SYMPTOMS
SHORTNESS OF BREATH: 0
ABDOMINAL PAIN: 0

## 2024-04-10 NOTE — TELEPHONE ENCOUNTER
14:45 called Pharmacy in regards to medication Hydrochlorothiazide  and Pharmacist's said medication will be available tomorrow after 12:00 noon message left on pt's phone in regards to this matter

## 2024-04-10 NOTE — PATIENT INSTRUCTIONS
Dear Estefani Chiu,        Thank you for coming to your appointment today. I hope we have addressed all of your needs.       Please make sure to do the following:  - Continue your medications as listed.  - Get labs drawn before our next follow up. We will call you with the results   -Please start taking Hydrochlorothiazide 12.5 mg daily as well as your Amlodipine.   -Please record your blood pressure in the log provide. Hold Blood pressure medication if blood pressure is lower then 100/80    Call for a sooner appointment if you have any concerns.     Have a great day!        Sincerely,  Johanne Lockwood MD  4/10/2024  9:17 AM

## 2024-04-10 NOTE — PROGRESS NOTES
[facility]  Internal Medicine Residency Clinic  Attending Physician Statement:  Noel Luciano M.D., F.A.C.P.  Patient is seen for fu visit today.  -- acute and chronic problems addressed  I have seen/discussed the case, including pertinent history and exam findings with the resident, review of last visit medical records/labs/imaging if applicable-     Addressed when applicable- Health maintenance issues of vaccinations, social determinants/depression/CA screening, tobacco cessation etc...    I agree with the assessment, plan and orders as documented by the resident.    -Billiing assessed by medical complexity of case  My assessment of high points of today's visit as follows:    Dm2- stable  But recent steroids ordered on 4/5/24  ER recently viral panel negative  Cxr-Bilateral ground-glass dense predominant from the mid to the lower aspect of both lungs.  Differential diagnosis include pulmonary edema versus diffuse alveolitis on basis of pneumonia  +bronchitis - steroids for bronchospasm stable  Stable      Lipitor 20QD  Pimary htn uncontrolled  Will add 2nd agent to norvasc  AA defer ACE/ARB  - hctz acceptable-- no protienuria or ckd noted  Defer higher risk med, NNT very high to prevent ESRD- per literature look up today

## 2024-04-10 NOTE — TELEPHONE ENCOUNTER
Pt called states medication you prescribed earlier is being denied Hydrochlorothiazide pharmacy called her and she wants you to call Pharmacy to see why? thanks

## 2024-04-15 ENCOUNTER — HOSPITAL ENCOUNTER (OUTPATIENT)
Dept: GENERAL RADIOLOGY | Age: 69
Discharge: HOME | End: 2024-04-17
Payer: MEDICARE

## 2024-04-15 VITALS — HEIGHT: 62 IN | BODY MASS INDEX: 31.28 KG/M2 | WEIGHT: 170 LBS

## 2024-04-15 DIAGNOSIS — Z12.31 ENCOUNTER FOR SCREENING MAMMOGRAM FOR MALIGNANT NEOPLASM OF BREAST: ICD-10-CM

## 2024-04-15 PROCEDURE — 77067 SCR MAMMO BI INCL CAD: CPT

## 2024-05-29 ENCOUNTER — TELEPHONE (OUTPATIENT)
Dept: PULMONOLOGY | Age: 69
End: 2024-05-29

## 2024-05-29 ENCOUNTER — OFFICE VISIT (OUTPATIENT)
Dept: PULMONOLOGY | Age: 69
End: 2024-05-29
Payer: MEDICARE

## 2024-05-29 VITALS
WEIGHT: 175.4 LBS | BODY MASS INDEX: 32.28 KG/M2 | HEART RATE: 87 BPM | TEMPERATURE: 97 F | DIASTOLIC BLOOD PRESSURE: 89 MMHG | HEIGHT: 62 IN | SYSTOLIC BLOOD PRESSURE: 210 MMHG | RESPIRATION RATE: 18 BRPM | OXYGEN SATURATION: 97 %

## 2024-05-29 DIAGNOSIS — R06.00 DYSPNEA, UNSPECIFIED TYPE: Primary | ICD-10-CM

## 2024-05-29 DIAGNOSIS — Z87.891 PERSONAL HISTORY OF TOBACCO USE: ICD-10-CM

## 2024-05-29 DIAGNOSIS — R59.9 ADENOPATHY: ICD-10-CM

## 2024-05-29 PROCEDURE — G8428 CUR MEDS NOT DOCUMENT: HCPCS | Performed by: INTERNAL MEDICINE

## 2024-05-29 PROCEDURE — 99213 OFFICE O/P EST LOW 20 MIN: CPT | Performed by: INTERNAL MEDICINE

## 2024-05-29 PROCEDURE — G8417 CALC BMI ABV UP PARAM F/U: HCPCS | Performed by: INTERNAL MEDICINE

## 2024-05-29 PROCEDURE — 3017F COLORECTAL CA SCREEN DOC REV: CPT | Performed by: INTERNAL MEDICINE

## 2024-05-29 PROCEDURE — 1090F PRES/ABSN URINE INCON ASSESS: CPT | Performed by: INTERNAL MEDICINE

## 2024-05-29 PROCEDURE — G8400 PT W/DXA NO RESULTS DOC: HCPCS | Performed by: INTERNAL MEDICINE

## 2024-05-29 PROCEDURE — 1036F TOBACCO NON-USER: CPT | Performed by: INTERNAL MEDICINE

## 2024-05-29 PROCEDURE — 1123F ACP DISCUSS/DSCN MKR DOCD: CPT | Performed by: INTERNAL MEDICINE

## 2024-05-29 PROCEDURE — 3077F SYST BP >= 140 MM HG: CPT | Performed by: INTERNAL MEDICINE

## 2024-05-29 PROCEDURE — 3079F DIAST BP 80-89 MM HG: CPT | Performed by: INTERNAL MEDICINE

## 2024-05-29 RX ORDER — IBUPROFEN 800 MG/1
800 TABLET ORAL EVERY 8 HOURS PRN
Qty: 90 TABLET | Refills: 0 | Status: SHIPPED | OUTPATIENT
Start: 2024-05-29

## 2024-05-29 NOTE — TELEPHONE ENCOUNTER
Mailed letter to patient to inform her of the CT of the Chest that is scheduled for her at [place], [place] . This test is scheduled on  Monday, June 24, 2024 at  11:30 am. Please arrive 30 minutes prior to appointment time. No Test Prep is needed

## 2024-05-29 NOTE — PROGRESS NOTES
[facility]     HISTORY OF PRESENT ILLNESS:    Estefani Chiu is a 68 y.o. year old female here for evaluation of acute hypoxemic resp failure, hospital follow up. The patient was admitted due to increasing shortness of breath, at that time she was noted to have significant hilar adenopathy, infiltrates, and was treated for a COPD exacerbation. She is accompanied to todays visit by her daughter. She reports that she had initially felt better when she was discharged but not has again been having increased shortness of breath. She is also have a cough productive of lots of phlegm. On arrival to the clinic today her O2 sat was 84%. She has not follow up with a physician since her discharge and does not currently have a pcp. She is a former smoker, currently she works at [email or web address] in the kitchen and reports that she does feel more short of breath while at work.     Updated HPI as of 5/29/24  Patient seen and examined. She is currently off work as it is the summer but previously had returned to all of her prior activities. She quit smoking in October. She has not had to use her albuterol rescue inhaler or nebulizer. She has not needed her oxygen. Recently seen by Dr. Garcia who adjusted her bp med. Does have sinus drainage associated with changes in the weather.       ALLERGIES:    Allergies   Allergen Reactions    Penicillins Swelling     Swelling hives, emesis       PAST MEDICAL HISTORY: No past medical history on file.    MEDICATIONS:   Current Outpatient Medications   Medication Sig Dispense Refill    Misc. Devices KIT Please provide patient with blood pressure cuff covered by patients insurance. 1 kit 0    hydroCHLOROthiazide (HYDRODIURIL) 25 MG tablet Take 0.5 tablets by mouth every morning 30 tablet 0    fluticasone (FLONASE) 50 MCG/ACT nasal spray 1 spray by Each Nostril route daily 16 g 2    metFORMIN (GLUCOPHAGE) 500 MG tablet Take 1 tablet by mouth 2

## 2024-05-29 NOTE — PATIENT INSTRUCTIONS
Cindy Sandoval    [facility]  [address] [place] [id number]  Office: [phone]      Your were seen in the office today for Hospital Follow Up       We  did not make changes to your medications today.        Testing ordered today was CT scan of the chest, Echocardiogram (ultrasound) of the heart.       Vaccines recommended none       Please do not hesitate to call the office with any questions.

## 2024-06-08 DIAGNOSIS — E78.5 HYPERLIPIDEMIA, UNSPECIFIED HYPERLIPIDEMIA TYPE: ICD-10-CM

## 2024-06-08 DIAGNOSIS — I10 PRIMARY HYPERTENSION: ICD-10-CM

## 2024-06-10 RX ORDER — ATORVASTATIN CALCIUM 20 MG/1
20 TABLET, FILM COATED ORAL DAILY
Qty: 90 TABLET | Refills: 0 | Status: SHIPPED | OUTPATIENT
Start: 2024-06-10

## 2024-06-10 RX ORDER — HYDROCHLOROTHIAZIDE 25 MG/1
12.5 TABLET ORAL EVERY MORNING
Qty: 30 TABLET | Refills: 0 | Status: SHIPPED | OUTPATIENT
Start: 2024-06-10

## 2024-06-10 NOTE — TELEPHONE ENCOUNTER
Last Appointment:  4/10/2024  Future Appointments   Date Time Provider Department Center   6/20/2024  8:30 AM Mimi Guerrero MD [facility]   6/24/2024 11:30 AM [facility] CT SCAN 3 [facility] CT [facility] Rad/Car   8/14/2024  9:00 AM Y BRAYAN ECHO 1 [facility] CARDIO SE Rad/Car   11/29/2024 11:20 AM Cindy Sandoval,  [facility] Pulm [facility]      HCTZ filled 4/10/24 #30 no refills = 60 days = 6/9/24  Next appt 6/20/24

## 2024-06-10 NOTE — TELEPHONE ENCOUNTER
Last Appointment:  3/6/2024  Future Appointments   Date Time Provider Department Center   6/20/2024  8:30 AM Mimi Guerrero MD [facility]   6/24/2024 11:30 AM SE CT SCAN 3 SEYZ CT SE Rad/Car   8/14/2024  9:00 AM RANDAL SCHMIDT ECHO 1 [facility] CARDIO SE Rad/Car   11/29/2024 11:20 AM Cindy Sandoval,  [facility]      Atorvastatin filled 3/6/24 #90 no refills = 90 days = 6/4/24  Next appt 6/20/24

## 2024-06-17 ENCOUNTER — HOSPITAL ENCOUNTER (INPATIENT)
Age: 69
LOS: 6 days | Discharge: HOME | DRG: 189 | End: 2024-06-23
Attending: EMERGENCY MEDICINE | Admitting: INTERNAL MEDICINE
Payer: MEDICARE

## 2024-06-17 ENCOUNTER — APPOINTMENT (OUTPATIENT)
Dept: GENERAL RADIOLOGY | Age: 69
DRG: 189 | End: 2024-06-17
Payer: MEDICARE

## 2024-06-17 ENCOUNTER — APPOINTMENT (OUTPATIENT)
Dept: CT IMAGING | Age: 69
DRG: 189 | End: 2024-06-17
Payer: MEDICARE

## 2024-06-17 DIAGNOSIS — R06.09 DYSPNEA ON EXERTION: ICD-10-CM

## 2024-06-17 DIAGNOSIS — R07.9 CHEST PAIN, UNSPECIFIED TYPE: ICD-10-CM

## 2024-06-17 DIAGNOSIS — R06.01 ORTHOPNEA: ICD-10-CM

## 2024-06-17 DIAGNOSIS — I25.5 ISCHEMIC CARDIOMYOPATHY: ICD-10-CM

## 2024-06-17 DIAGNOSIS — I50.9 CONGESTIVE HEART FAILURE, UNSPECIFIED HF CHRONICITY, UNSPECIFIED HEART FAILURE TYPE (HCC): ICD-10-CM

## 2024-06-17 DIAGNOSIS — R55 SYNCOPE, UNSPECIFIED SYNCOPE TYPE: ICD-10-CM

## 2024-06-17 DIAGNOSIS — J18.9 PNEUMONIA DUE TO INFECTIOUS ORGANISM, UNSPECIFIED LATERALITY, UNSPECIFIED PART OF LUNG: Primary | ICD-10-CM

## 2024-06-17 DIAGNOSIS — R55 PRE-SYNCOPE: ICD-10-CM

## 2024-06-17 DIAGNOSIS — J96.01 ACUTE RESPIRATORY FAILURE WITH HYPOXIA (HCC): ICD-10-CM

## 2024-06-17 DIAGNOSIS — R06.02 SHORTNESS OF BREATH: ICD-10-CM

## 2024-06-17 DIAGNOSIS — R42 DIZZINESS: ICD-10-CM

## 2024-06-17 LAB
ALBUMIN SERPL-MCNC: 4.4 G/DL (ref 3.5–5.2)
ALP SERPL-CCNC: 109 U/L (ref 35–104)
ALT SERPL-CCNC: 10 U/L (ref 0–32)
AMPHET UR QL SCN: NEGATIVE
ANION GAP SERPL CALCULATED.3IONS-SCNC: 11 MMOL/L (ref 7–16)
APAP SERPL-MCNC: <5 UG/ML (ref 10–30)
AST SERPL-CCNC: 18 U/L (ref 0–31)
BARBITURATES UR QL SCN: NEGATIVE
BASOPHILS # BLD: 0.04 K/UL (ref 0–0.2)
BASOPHILS # BLD: 0.05 K/UL (ref 0–0.2)
BASOPHILS NFR BLD: 0 % (ref 0–2)
BASOPHILS NFR BLD: 1 % (ref 0–2)
BENZODIAZ UR QL: NEGATIVE
BILIRUB SERPL-MCNC: 0.6 MG/DL (ref 0–1.2)
BILIRUB UR QL STRIP: NEGATIVE
BNP SERPL-MCNC: 79 PG/ML (ref 0–125)
BUN SERPL-MCNC: 10 MG/DL (ref 6–23)
BUPRENORPHINE UR QL: NEGATIVE
CALCIUM SERPL-MCNC: 10 MG/DL (ref 8.6–10.2)
CANNABINOIDS UR QL SCN: NEGATIVE
CHLORIDE SERPL-SCNC: 99 MMOL/L (ref 98–107)
CLARITY UR: CLEAR
CO2 SERPL-SCNC: 29 MMOL/L (ref 22–29)
COCAINE UR QL SCN: NEGATIVE
COLOR UR: YELLOW
CREAT SERPL-MCNC: 0.5 MG/DL (ref 0.5–1)
EKG ATRIAL RATE: 115 BPM
EKG P AXIS: 81 DEGREES
EKG P-R INTERVAL: 200 MS
EKG Q-T INTERVAL: 314 MS
EKG QRS DURATION: 72 MS
EKG QTC CALCULATION (BAZETT): 434 MS
EKG R AXIS: 96 DEGREES
EKG T AXIS: 66 DEGREES
EKG VENTRICULAR RATE: 115 BPM
EOSINOPHIL # BLD: 0.02 K/UL (ref 0.05–0.5)
EOSINOPHIL # BLD: 1.36 K/UL (ref 0.05–0.5)
EOSINOPHILS RELATIVE PERCENT: 0 % (ref 0–6)
EOSINOPHILS RELATIVE PERCENT: 18 % (ref 0–6)
EPI CELLS #/AREA URNS HPF: NORMAL /HPF
ERYTHROCYTE [DISTWIDTH] IN BLOOD BY AUTOMATED COUNT: 14.3 % (ref 11.5–15)
ERYTHROCYTE [DISTWIDTH] IN BLOOD BY AUTOMATED COUNT: 14.3 % (ref 11.5–15)
ETHANOLAMINE SERPL-MCNC: <10 MG/DL (ref 0–0.08)
FENTANYL UR QL: NEGATIVE
GFR, ESTIMATED: >90 ML/MIN/1.73M2
GLUCOSE BLD-MCNC: 139 MG/DL (ref 74–99)
GLUCOSE BLD-MCNC: 170 MG/DL (ref 74–99)
GLUCOSE SERPL-MCNC: 147 MG/DL (ref 74–99)
GLUCOSE UR STRIP-MCNC: NEGATIVE MG/DL
HCT VFR BLD AUTO: 43.9 % (ref 34–48)
HCT VFR BLD AUTO: 46.2 % (ref 34–48)
HGB BLD-MCNC: 13.9 G/DL (ref 11.5–15.5)
HGB BLD-MCNC: 14.6 G/DL (ref 11.5–15.5)
HGB UR QL STRIP.AUTO: NEGATIVE
IMM GRANULOCYTES # BLD AUTO: 0.04 K/UL (ref 0–0.58)
IMM GRANULOCYTES # BLD AUTO: 0.12 K/UL (ref 0–0.58)
IMM GRANULOCYTES NFR BLD: 1 % (ref 0–5)
IMM GRANULOCYTES NFR BLD: 1 % (ref 0–5)
INR PPP: 1.2
KETONES UR STRIP-MCNC: NEGATIVE MG/DL
LACTATE BLDV-SCNC: 1.2 MMOL/L (ref 0.5–1.9)
LEUKOCYTE ESTERASE UR QL STRIP: NEGATIVE
LYMPHOCYTES NFR BLD: 1.95 K/UL (ref 1.5–4)
LYMPHOCYTES NFR BLD: 2.23 K/UL (ref 1.5–4)
LYMPHOCYTES RELATIVE PERCENT: 21 % (ref 20–42)
LYMPHOCYTES RELATIVE PERCENT: 30 % (ref 20–42)
MAGNESIUM SERPL-MCNC: 1.9 MG/DL (ref 1.6–2.6)
MCH RBC QN AUTO: 29.4 PG (ref 26–35)
MCH RBC QN AUTO: 29.6 PG (ref 26–35)
MCHC RBC AUTO-ENTMCNC: 31.6 G/DL (ref 32–34.5)
MCHC RBC AUTO-ENTMCNC: 31.7 G/DL (ref 32–34.5)
MCV RBC AUTO: 93.1 FL (ref 80–99.9)
MCV RBC AUTO: 93.6 FL (ref 80–99.9)
METHADONE UR QL: NEGATIVE
MONOCYTES NFR BLD: 0.18 K/UL (ref 0.1–0.95)
MONOCYTES NFR BLD: 0.3 K/UL (ref 0.1–0.95)
MONOCYTES NFR BLD: 2 % (ref 2–12)
MONOCYTES NFR BLD: 4 % (ref 2–12)
NEUTROPHILS NFR BLD: 46 % (ref 43–80)
NEUTROPHILS NFR BLD: 75 % (ref 43–80)
NEUTS SEG NFR BLD: 3.4 K/UL (ref 1.8–7.3)
NEUTS SEG NFR BLD: 6.95 K/UL (ref 1.8–7.3)
NITRITE UR QL STRIP: NEGATIVE
OPIATES UR QL SCN: NEGATIVE
OXYCODONE UR QL SCN: NEGATIVE
PCP UR QL SCN: NEGATIVE
PH UR STRIP: 6 [PH] (ref 5–9)
PLATELET # BLD AUTO: 299 K/UL (ref 130–450)
PLATELET # BLD AUTO: 313 K/UL (ref 130–450)
PMV BLD AUTO: 8.8 FL (ref 7–12)
PMV BLD AUTO: 8.9 FL (ref 7–12)
POTASSIUM SERPL-SCNC: 4.7 MMOL/L (ref 3.5–5)
PROCALCITONIN SERPL-MCNC: 0.04 NG/ML (ref 0–0.08)
PROT SERPL-MCNC: 8.9 G/DL (ref 6.4–8.3)
PROT UR STRIP-MCNC: NEGATIVE MG/DL
PROTHROMBIN TIME: 13.5 SEC (ref 9.3–12.4)
RBC # BLD AUTO: 4.69 M/UL (ref 3.5–5.5)
RBC # BLD AUTO: 4.96 M/UL (ref 3.5–5.5)
RBC #/AREA URNS HPF: NORMAL /HPF
SALICYLATES SERPL-MCNC: <0.3 MG/DL (ref 0–30)
SODIUM SERPL-SCNC: 139 MMOL/L (ref 132–146)
SP GR UR STRIP: 1.01 (ref 1–1.03)
TEST INFORMATION: NORMAL
TOXIC TRICYCLIC SC,BLOOD: NEGATIVE
TROPONIN I SERPL HS-MCNC: 11 NG/L (ref 0–9)
TROPONIN I SERPL HS-MCNC: 13 NG/L (ref 0–9)
TROPONIN I SERPL HS-MCNC: NORMAL NG/L (ref 0–14)
TROPONIN INTERP: NORMAL
TROPONIN T SERPL-MCNC: NORMAL NG/ML
UROBILINOGEN UR STRIP-ACNC: 0.2 EU/DL (ref 0–1)
WBC #/AREA URNS HPF: NORMAL /HPF
WBC OTHER # BLD: 7.4 K/UL (ref 4.5–11.5)
WBC OTHER # BLD: 9.3 K/UL (ref 4.5–11.5)

## 2024-06-17 PROCEDURE — 84145 PROCALCITONIN (PCT): CPT

## 2024-06-17 PROCEDURE — 2060000000 HC ICU INTERMEDIATE R&B

## 2024-06-17 PROCEDURE — 6360000004 HC RX CONTRAST MEDICATION: Performed by: RADIOLOGY

## 2024-06-17 PROCEDURE — 82962 GLUCOSE BLOOD TEST: CPT

## 2024-06-17 PROCEDURE — 2580000003 HC RX 258

## 2024-06-17 PROCEDURE — 87449 NOS EACH ORGANISM AG IA: CPT

## 2024-06-17 PROCEDURE — 93005 ELECTROCARDIOGRAM TRACING: CPT

## 2024-06-17 PROCEDURE — 96374 THER/PROPH/DIAG INJ IV PUSH: CPT

## 2024-06-17 PROCEDURE — 6370000000 HC RX 637 (ALT 250 FOR IP)

## 2024-06-17 PROCEDURE — 93010 ELECTROCARDIOGRAM REPORT: CPT | Performed by: INTERNAL MEDICINE

## 2024-06-17 PROCEDURE — 80307 DRUG TEST PRSMV CHEM ANLYZR: CPT

## 2024-06-17 PROCEDURE — 94640 AIRWAY INHALATION TREATMENT: CPT

## 2024-06-17 PROCEDURE — 80179 DRUG ASSAY SALICYLATE: CPT

## 2024-06-17 PROCEDURE — 85610 PROTHROMBIN TIME: CPT

## 2024-06-17 PROCEDURE — 99285 EMERGENCY DEPT VISIT HI MDM: CPT

## 2024-06-17 PROCEDURE — 71275 CT ANGIOGRAPHY CHEST: CPT

## 2024-06-17 PROCEDURE — 80053 COMPREHEN METABOLIC PANEL: CPT

## 2024-06-17 PROCEDURE — 85025 COMPLETE CBC W/AUTO DIFF WBC: CPT

## 2024-06-17 PROCEDURE — 2580000003 HC RX 258: Performed by: EMERGENCY MEDICINE

## 2024-06-17 PROCEDURE — 83735 ASSAY OF MAGNESIUM: CPT

## 2024-06-17 PROCEDURE — 6360000002 HC RX W HCPCS

## 2024-06-17 PROCEDURE — 84484 ASSAY OF TROPONIN QUANT: CPT

## 2024-06-17 PROCEDURE — 81001 URINALYSIS AUTO W/SCOPE: CPT

## 2024-06-17 PROCEDURE — 83605 ASSAY OF LACTIC ACID: CPT

## 2024-06-17 PROCEDURE — 87899 AGENT NOS ASSAY W/OPTIC: CPT

## 2024-06-17 PROCEDURE — 87040 BLOOD CULTURE FOR BACTERIA: CPT

## 2024-06-17 PROCEDURE — 2700000000 HC OXYGEN THERAPY PER DAY

## 2024-06-17 PROCEDURE — 71045 X-RAY EXAM CHEST 1 VIEW: CPT

## 2024-06-17 PROCEDURE — G0480 DRUG TEST DEF 1-7 CLASSES: HCPCS

## 2024-06-17 PROCEDURE — 82785 ASSAY OF IGE: CPT

## 2024-06-17 PROCEDURE — 83880 ASSAY OF NATRIURETIC PEPTIDE: CPT

## 2024-06-17 PROCEDURE — 80143 DRUG ASSAY ACETAMINOPHEN: CPT

## 2024-06-17 RX ORDER — AMLODIPINE BESYLATE 5 MG/1
5 TABLET ORAL DAILY
Status: DISCONTINUED | OUTPATIENT
Start: 2024-06-17 | End: 2024-06-20

## 2024-06-17 RX ORDER — ACETAMINOPHEN 325 MG/1
650 TABLET ORAL EVERY 6 HOURS PRN
Status: DISCONTINUED | OUTPATIENT
Start: 2024-06-17 | End: 2024-06-23 | Stop reason: HOSPADM

## 2024-06-17 RX ORDER — ACETAMINOPHEN 650 MG/1
650 SUPPOSITORY RECTAL EVERY 6 HOURS PRN
Status: DISCONTINUED | OUTPATIENT
Start: 2024-06-17 | End: 2024-06-23 | Stop reason: HOSPADM

## 2024-06-17 RX ORDER — FLUTICASONE PROPIONATE 50 MCG
1 SPRAY, SUSPENSION (ML) NASAL DAILY
Status: DISCONTINUED | OUTPATIENT
Start: 2024-06-18 | End: 2024-06-23 | Stop reason: HOSPADM

## 2024-06-17 RX ORDER — HYDROCHLOROTHIAZIDE 25 MG/1
12.5 TABLET ORAL EVERY MORNING
Status: DISCONTINUED | OUTPATIENT
Start: 2024-06-18 | End: 2024-06-20

## 2024-06-17 RX ORDER — CETIRIZINE HYDROCHLORIDE 10 MG/1
10 TABLET ORAL DAILY
Status: DISCONTINUED | OUTPATIENT
Start: 2024-06-17 | End: 2024-06-23 | Stop reason: HOSPADM

## 2024-06-17 RX ORDER — SODIUM CHLORIDE 9 MG/ML
INJECTION, SOLUTION INTRAVENOUS PRN
Status: DISCONTINUED | OUTPATIENT
Start: 2024-06-17 | End: 2024-06-23 | Stop reason: HOSPADM

## 2024-06-17 RX ORDER — SODIUM CHLORIDE 0.9 % (FLUSH) 0.9 %
5-40 SYRINGE (ML) INJECTION EVERY 12 HOURS SCHEDULED
Status: DISCONTINUED | OUTPATIENT
Start: 2024-06-17 | End: 2024-06-23 | Stop reason: HOSPADM

## 2024-06-17 RX ORDER — IPRATROPIUM BROMIDE AND ALBUTEROL SULFATE 2.5; .5 MG/3ML; MG/3ML
1 SOLUTION RESPIRATORY (INHALATION)
Status: DISCONTINUED | OUTPATIENT
Start: 2024-06-17 | End: 2024-06-18

## 2024-06-17 RX ORDER — POLYETHYLENE GLYCOL 3350 17 G/17G
17 POWDER, FOR SOLUTION ORAL DAILY PRN
Status: DISCONTINUED | OUTPATIENT
Start: 2024-06-17 | End: 2024-06-23 | Stop reason: HOSPADM

## 2024-06-17 RX ORDER — INSULIN LISPRO 100 [IU]/ML
0-4 INJECTION, SOLUTION INTRAVENOUS; SUBCUTANEOUS NIGHTLY
Status: DISCONTINUED | OUTPATIENT
Start: 2024-06-17 | End: 2024-06-22

## 2024-06-17 RX ORDER — BUDESONIDE 0.5 MG/2ML
0.5 INHALANT ORAL
Status: DISCONTINUED | OUTPATIENT
Start: 2024-06-17 | End: 2024-06-23 | Stop reason: HOSPADM

## 2024-06-17 RX ORDER — INSULIN LISPRO 100 [IU]/ML
0-4 INJECTION, SOLUTION INTRAVENOUS; SUBCUTANEOUS
Status: DISCONTINUED | OUTPATIENT
Start: 2024-06-17 | End: 2024-06-22

## 2024-06-17 RX ORDER — IPRATROPIUM BROMIDE AND ALBUTEROL SULFATE 2.5; .5 MG/3ML; MG/3ML
3 SOLUTION RESPIRATORY (INHALATION) ONCE
Status: COMPLETED | OUTPATIENT
Start: 2024-06-17 | End: 2024-06-17

## 2024-06-17 RX ORDER — SODIUM CHLORIDE 0.9 % (FLUSH) 0.9 %
5-40 SYRINGE (ML) INJECTION PRN
Status: DISCONTINUED | OUTPATIENT
Start: 2024-06-17 | End: 2024-06-23 | Stop reason: HOSPADM

## 2024-06-17 RX ORDER — ATORVASTATIN CALCIUM 20 MG/1
20 TABLET, FILM COATED ORAL DAILY
Status: DISCONTINUED | OUTPATIENT
Start: 2024-06-17 | End: 2024-06-20

## 2024-06-17 RX ORDER — ONDANSETRON 4 MG/1
4 TABLET, ORALLY DISINTEGRATING ORAL EVERY 8 HOURS PRN
Status: DISCONTINUED | OUTPATIENT
Start: 2024-06-17 | End: 2024-06-23 | Stop reason: HOSPADM

## 2024-06-17 RX ORDER — DEXTROSE MONOHYDRATE 100 MG/ML
INJECTION, SOLUTION INTRAVENOUS CONTINUOUS PRN
Status: DISCONTINUED | OUTPATIENT
Start: 2024-06-17 | End: 2024-06-23 | Stop reason: HOSPADM

## 2024-06-17 RX ORDER — 0.9 % SODIUM CHLORIDE 0.9 %
1000 INTRAVENOUS SOLUTION INTRAVENOUS ONCE
Status: COMPLETED | OUTPATIENT
Start: 2024-06-17 | End: 2024-06-17

## 2024-06-17 RX ORDER — ONDANSETRON 2 MG/ML
4 INJECTION INTRAMUSCULAR; INTRAVENOUS EVERY 6 HOURS PRN
Status: DISCONTINUED | OUTPATIENT
Start: 2024-06-17 | End: 2024-06-23 | Stop reason: HOSPADM

## 2024-06-17 RX ORDER — GLUCAGON 1 MG/ML
1 KIT INJECTION PRN
Status: DISCONTINUED | OUTPATIENT
Start: 2024-06-17 | End: 2024-06-23 | Stop reason: HOSPADM

## 2024-06-17 RX ORDER — ARFORMOTEROL TARTRATE 15 UG/2ML
15 SOLUTION RESPIRATORY (INHALATION)
Status: DISCONTINUED | OUTPATIENT
Start: 2024-06-17 | End: 2024-06-23 | Stop reason: HOSPADM

## 2024-06-17 RX ORDER — ENOXAPARIN SODIUM 100 MG/ML
40 INJECTION SUBCUTANEOUS DAILY
Status: DISCONTINUED | OUTPATIENT
Start: 2024-06-17 | End: 2024-06-23 | Stop reason: HOSPADM

## 2024-06-17 RX ADMIN — SODIUM CHLORIDE 1000 ML: 9 INJECTION, SOLUTION INTRAVENOUS at 11:34

## 2024-06-17 RX ADMIN — ARFORMOTEROL TARTRATE 15 MCG: 15 SOLUTION RESPIRATORY (INHALATION) at 20:16

## 2024-06-17 RX ADMIN — IPRATROPIUM BROMIDE AND ALBUTEROL SULFATE 3 DOSE: .5; 3 SOLUTION RESPIRATORY (INHALATION) at 10:38

## 2024-06-17 RX ADMIN — ATORVASTATIN CALCIUM 20 MG: 20 TABLET, FILM COATED ORAL at 18:18

## 2024-06-17 RX ADMIN — WATER 125 MG: 1 INJECTION INTRAMUSCULAR; INTRAVENOUS; SUBCUTANEOUS at 09:35

## 2024-06-17 RX ADMIN — BUDESONIDE INHALATION 500 MCG: 0.5 SUSPENSION RESPIRATORY (INHALATION) at 20:16

## 2024-06-17 RX ADMIN — IPRATROPIUM BROMIDE AND ALBUTEROL SULFATE 1 DOSE: 2.5; .5 SOLUTION RESPIRATORY (INHALATION) at 16:58

## 2024-06-17 RX ADMIN — ENOXAPARIN SODIUM 40 MG: 100 INJECTION SUBCUTANEOUS at 18:18

## 2024-06-17 RX ADMIN — WATER 40 MG: 1 INJECTION INTRAMUSCULAR; INTRAVENOUS; SUBCUTANEOUS at 18:20

## 2024-06-17 RX ADMIN — SODIUM CHLORIDE, PRESERVATIVE FREE 10 ML: 5 INJECTION INTRAVENOUS at 22:25

## 2024-06-17 RX ADMIN — IPRATROPIUM BROMIDE AND ALBUTEROL SULFATE 1 DOSE: 2.5; .5 SOLUTION RESPIRATORY (INHALATION) at 20:16

## 2024-06-17 RX ADMIN — IOPAMIDOL 75 ML: 755 INJECTION, SOLUTION INTRAVENOUS at 15:48

## 2024-06-17 RX ADMIN — Medication 1500 MG: at 12:04

## 2024-06-17 RX ADMIN — CEFEPIME 2000 MG: 2 INJECTION, POWDER, FOR SOLUTION INTRAVENOUS at 18:21

## 2024-06-17 RX ADMIN — CEFEPIME 2000 MG: 2 INJECTION, POWDER, FOR SOLUTION INTRAVENOUS at 11:33

## 2024-06-17 RX ADMIN — AMLODIPINE BESYLATE 5 MG: 5 TABLET ORAL at 18:19

## 2024-06-17 NOTE — H&P
[facility]  Internal Medicine Residency Program  History and Physical    Patient:  Estefani Chiu 68 y.o. female   MRN: [id number]       Date of Service: 6/17/2024     Chief complaint: had concerns including Chest Pain (Chest tightness 2 days ago- worsens with exertion) and Shortness of Breath (85% on normal 4 L).    History of Present Illness     The patient is a 68 y.o. female , presented with chief complaint of increased shortness of breath since past week aggravated since past 3 to 4 days.  She endorses shortness of breath to be present throughout the day, aggravated with activities without any relieving factors, a/w cough, usually dry; production of non blood-tinged whitish frothy sputum occasionally.  Patient also complaining of chest tightness, central/precordial region since same duration, does not complain of any pain, or migration/radiation of discomfort towards any other recent.  She was complaining of occasional lightheadedness, presented only when she stood up quickly.  Patient was also complaining of sinus congestion and congested nose since past October, said was addressed multiple times but did not have completely relieved of her symptoms.    She was also complaining of occasional nasal bleed, last time was yesterday said she swallowed it and spat it out, patient was implying that it usually occurs from the left nares. States she was recently diagnosed with pneumonia, last month but has not gotten better completely.    Patient denies any fever but was complaining of occasional chills, no headache no nausea, no vomiting, no blurring of vision, no chest pain, no abdominal pain, no change in bladder or bowel habit, no recent sick contacts or travels.  Patient denied orthopnea or paroxysmal nocturnal dyspnea, no history of DVT or PE in the past  Has quit smoking since October of last year, does not drink alcohol, not to smoke marijuana or any other substance of abuse.Patient  06/17/2024 09:15 AM     06/17/2024 09:15 AM    MCV 93.1 06/17/2024 09:15 AM    MCH 29.4 06/17/2024 09:15 AM    MCHC 31.6 06/17/2024 09:15 AM    RDW 14.3 06/17/2024 09:15 AM    LYMPHOPCT 30 06/17/2024 09:15 AM    MONOPCT 4 06/17/2024 09:15 AM    EOSPCT 18 06/17/2024 09:15 AM    BASOPCT 1 06/17/2024 09:15 AM    MONOSABS 0.30 06/17/2024 09:15 AM    LYMPHSABS 3.15 01/07/2014 11:17 AM    EOSABS 1.36 06/17/2024 09:15 AM    BASOSABS 0.05 06/17/2024 09:15 AM     CMP:    Lab Results   Component Value Date/Time     06/17/2024 09:15 AM    K 4.7 06/17/2024 09:15 AM    CL 99 06/17/2024 09:15 AM    CO2 29 06/17/2024 09:15 AM    BUN 10 06/17/2024 09:15 AM    CREATININE 0.5 06/17/2024 09:15 AM    GFRAA >60 03/24/2014 03:27 PM    LABGLOM >90 06/17/2024 09:15 AM    LABGLOM >90 04/05/2024 07:28 PM    GLUCOSE 147 06/17/2024 09:15 AM    CALCIUM 10.0 06/17/2024 09:15 AM    BILITOT 0.6 06/17/2024 09:15 AM    ALKPHOS 109 06/17/2024 09:15 AM    AST 18 06/17/2024 09:15 AM    ALT 10 06/17/2024 09:15 AM     Sodium:    Lab Results   Component Value Date/Time     06/17/2024 09:15 AM     Potassium:    Lab Results   Component Value Date/Time    K 4.7 06/17/2024 09:15 AM     BUN/Creatinine:    Lab Results   Component Value Date/Time    BUN 10 06/17/2024 09:15 AM    CREATININE 0.5 06/17/2024 09:15 AM       Imaging Studies:    XR CHEST PORTABLE   Final Result   1. Patchy bilateral perihilar and lower lobe airspace disease.              Resident's Assessment and Plan     Assessment and Plan:    Acute hypoxic respiratory insufficiency likely 2/2 chronic bronchitis vs  asthma vs   Primary hypertension, well controlled  Type 2 diabetes mellitus    Cavernous hemangioma on the right hepatic lobe, 5mm  Flash fill hemangioma on the right side, 7mm  Hypertension   Mild CAD on imaging  Class 1 obesity  H/O tobacco abuse Smoker    Hilar and Mediastinal Adenopathy  on past imaging  Elevated IgE  Past H/O stroke   Vit D deficiency    Monitor

## 2024-06-17 NOTE — ED PROVIDER NOTES
Department of Emergency Medicine   ED Provider Note  Admit Date/RoomTime: 6/17/2024  8:41 AM  ED Room: 11/11          History of Present Illness:  6/17/24, Time: 9:03 AM EDT       Estefani Chiu is a 68 y.o. female presenting to the ED for chest tightness and SOB.  She notes she has a history of asthma and had previously been on oxygen when she had pneumonia.  Notes she had taken herself off of that about 2 months ago as her symptoms were improving and her pulmonologist was okay with this.  However she notes over the past few days she has had to restart her oxygen noting she is normally on 3 L nasal cannula however today had to increase it to 4 given her worsening shortness of breath however on presentation she is still hypoxic to 85% on the 4 L.  Increased to 5 L and saturations 91 to 94%.  Also complaining of chest tightness, no worsening or relieving factors.  Notes it feels similar to when she had pneumonia in the past.  Has been having persistent dry cough since about October.  No fevers however does report she has had some chills.  No abdominal pain, nausea or vomiting, leg swelling, recent travel or surgeries, immobilizations, history of blood clots or cancer.    Review of Systems:     Pertinent positives and negatives are stated within HPI.      --------------------------------------------- PAST HISTORY ---------------------------------------------  Past Medical History:  has no past medical history on file.    Past Surgical History:  has no past surgical history on file.    Social History:  reports that she quit smoking about 6 months ago. Her smoking use included cigarettes. She started smoking about 14 years ago. She has never used smokeless tobacco. She reports that she does not currently use alcohol. She reports that she does not use drugs.    Family History: family history is not on file.     The patient’s home medications have been reviewed.    Allergies:    Result Value Ref Range    Specimen Description .BLOOD     Special Requests          Culture NO GROWTH <24 HRS    CBC with Auto Differential   Result Value Ref Range    WBC 7.4 4.5 - 11.5 k/uL    RBC 4.96 3.50 - 5.50 m/uL    Hemoglobin 14.6 11.5 - 15.5 g/dL    Hematocrit 46.2 34.0 - 48.0 %    MCV 93.1 80.0 - 99.9 fL    MCH 29.4 26.0 - 35.0 pg    MCHC 31.6 (L) 32.0 - 34.5 g/dL    RDW 14.3 11.5 - 15.0 %    Platelets 299 130 - 450 k/uL    MPV 8.8 7.0 - 12.0 fL    Neutrophils % 46 43.0 - 80.0 %    Lymphocytes % 30 20.0 - 42.0 %    Monocytes % 4 2.0 - 12.0 %    Eosinophils % 18 (H) 0 - 6 %    Basophils % 1 0.0 - 2.0 %    Immature Granulocytes % 1 0.0 - 5.0 %    Neutrophils Absolute 3.40 1.80 - 7.30 k/uL    Lymphocytes Absolute 2.23 1.50 - 4.00 k/uL    Monocytes Absolute 0.30 0.10 - 0.95 k/uL    Eosinophils Absolute 1.36 (H) 0.05 - 0.50 k/uL    Basophils Absolute 0.05 0.00 - 0.20 k/uL    Immature Granulocytes Absolute 0.04 0.00 - 0.58 k/uL   CMP   Result Value Ref Range    Sodium 139 132 - 146 mmol/L    Potassium 4.7 3.5 - 5.0 mmol/L    Chloride 99 98 - 107 mmol/L    CO2 29 22 - 29 mmol/L    Anion Gap 11 7 - 16 mmol/L    Glucose 147 (H) 74 - 99 mg/dL    BUN 10 6 - 23 mg/dL    Creatinine 0.5 0.50 - 1.00 mg/dL    Est, Glom Filt Rate >90 >60 mL/min/1.73m2    Calcium 10.0 8.6 - 10.2 mg/dL    Total Protein 8.9 (H) 6.4 - 8.3 g/dL    Albumin 4.4 3.5 - 5.2 g/dL    Total Bilirubin 0.6 0.0 - 1.2 mg/dL    Alkaline Phosphatase 109 (H) 35 - 104 U/L    ALT 10 0 - 32 U/L    AST 18 0 - 31 U/L   Magnesium   Result Value Ref Range    Magnesium 1.9 1.6 - 2.6 mg/dL   Troponin   Result Value Ref Range    Troponin, High Sensitivity Unable to perform testing: Specimen hemolyzed. 0 - 14 ng/L    Troponin T Unable to perform testing: Specimen hemolyzed. <0.03 ng/mL    Troponin Interp Unable to perform testing: Specimen hemolyzed.    Brain Natriuretic Peptide   Result Value Ref Range    Pro-BNP 79 0 - 125 pg/mL   Troponin   Result Value Ref Range    Est, Glom Filt Rate >90   Calcium 10.0   Total Protein 8.9(!)   Albumin 4.4   Total Bilirubin 0.6   Alkaline Phosphatase 109(!)   ALT 10   AST 18 [CD]   1058 CBC with Auto Differential(!):    WBC 7.4   RBC 4.96   Hemoglobin Quant 14.6   Hematocrit 46.2   MCV 93.1   MCH 29.4   MCHC 31.6(!)   RDW 14.3   Platelet Count 299   MPV 8.8   Neutrophils % 46   Lymphocyte % 30   Monocytes % 4   Eosinophils % 18(!)   Basophils % 1   Immature Granulocytes % 1   Neutrophils Absolute 3.40   Lymphocytes Absolute 2.23   Monocytes Absolute 0.30   Eosinophils Absolute 1.36(!)   Basophils Absolute 0.05   Immature Granulocytes Absolute 0.04 [CD]   1058 Magnesium:    Magnesium 1.9 [CD]   1117 Spoke with Dr. Velez who agreed to admit.  [CP]      ED Course User Index  [CD] Nba Villagomez MD  [CP] Christina Blackwood,        Medical Decision Making  Estefani is a 68-year-old female presenting to the ED with complaints of chest pain and shortness of breath.  She notes her pain is a tightness in her chest.  Also been having a nonproductive cough.  Patient has needed to go back on her home oxygen and increase it from 3 L to 4, she is on 5 L in the ED with saturations 91%.  Given her symptoms, concern for differentials as listed below.  Therefore, workup was obtained.  CBC and CMP unremarkable.  Magnesium within normal limits of 1.9.  BNP at 79.  Troponin with no significant delta thus unlikely ACS.  Blood cultures were obtained.  Lactic acid within normal limits at 1.2.  EKG as documented in ED course.  Chest x-ray revealing patchy bilateral opacifications concerning for pneumonia.  Therefore, patient given cefepime and vancomycin as she has recently been on antibiotics for pneumonia.  She was also given 3 DuoNebs and 125 Solu-Medrol on presentation.  Plan to admit to medicine for further evaluation and management.  She is understanding and agreeable to plan.    Amount and/or Complexity of Data Reviewed  Labs: ordered. Decision-making details

## 2024-06-17 NOTE — PROGRESS NOTES
Antibiotic Extended Infusion Policy     This patient is on medication that requires renal, weight, and/or indication dose adjustment.      Date Body Weight IBW  Adjusted BW SCr  CrCl Dialysis status BMI   6/17/2024 79.4 kg (175 lb) Ideal body weight: 50.1 kg (110 lb 7.2 oz)  Adjusted ideal body weight: 61.8 kg (136 lb 4.3 oz) Serum creatinine: 0.5 mg/dL 06/17/24 0915  Estimated creatinine clearance: 105 mL/min N/a Body mass index is 32.01 kg/m².       Pharmacy has dose-adjusted the following medication(s):    Ordered Medication: Cefepime 2000mg q12h     Order Changed/converted to: Cefepime 2000mg q8h    These changes were made per protocol according to the [facility]   Automatic Extended Infusion Dose Adjustment Policy.     *Please note this dose may need readjusted if patient's condition changes.    Please contact pharmacy with any questions regarding these changes.    Gonzalez Carrillo RPH  6/17/2024  3:25 PM

## 2024-06-17 NOTE — ED PROVIDER NOTES
ATTENDING PROVIDER ATTESTATION:     Estefani Chiu presented to the emergency department for evaluation of Chest Pain (Chest tightness 2 days ago- worsens with exertion) and Shortness of Breath (85% on normal 4 L)   and was initially evaluated by the Medical Resident. See Original ED Note for H&P and ED course above.     I have reviewed and discussed the case, including pertinent history (medical, surgical, family and social) and exam findings with the Medical Resident assigned to Estefani Chiu.  I have personally performed and/or participated in the history, exam, medical decision making, and procedures and agree with all pertinent clinical information and any additional changes or corrections are noted below in my assessment and plan. I have discussed this patient in detail with the resident, and provided the instruction and education,       I have reviewed my findings and recommendations with the assigned Medical Resident, Estefani Chiu and members of family present at the time of disposition.      I have performed a history and physical examination of this patient and directly supervised the resident caring for this patient              [facility] EMERGENCY DEPARTMENT  EMERGENCY DEPARTMENT ENCOUNTER        Pt Name: Estefani Chiu  MRN: [id number]  Birthdate 1955  Date of evaluation: 6/17/2024  Provider: Nba Villagomez MD  PCP: Maxwell Garcia MD  Note Started: 8:59 AM EDT 6/17/24    CHIEF COMPLAINT       Chief Complaint   Patient presents with    Chest Pain     Chest tightness 2 days ago- worsens with exertion    Shortness of Breath     85% on normal 4 L       HISTORY OF PRESENT ILLNESS: 1 or more Elements     Limitations to history : None    Estefani Chiu is a 68 y.o. female who presents for shortness of breath and chest pressure.  Patient reports worsening shortness of breath for the last few days.  She reports cough and congestion with wheezing.  She  IPRATROPIUM 0.5 MG-ALBUTEROL 2.5 MG (DUONEB) 0.5-2.5 (3) MG/3ML SOLN NEBULIZER SOLUTION    Inhale 3 mLs into the lungs every 6 hours as needed for Shortness of Breath    METFORMIN (GLUCOPHAGE) 500 MG TABLET    Take 1 tablet by mouth 2 times daily (with meals)    MISC. DEVICES KIT    Please provide patient with blood pressure cuff covered by patients insurance.    RESPIRATORY THERAPY SUPPLIES (FULL KIT NEBULIZER SET) MISC    Use as directed with nebulized medication.       ALLERGIES     Penicillins    FAMILYHISTORY     History reviewed. No pertinent family history.     SOCIAL HISTORY       Social History     Tobacco Use    Smoking status: Former     Current packs/day: 0.00     Types: Cigarettes     Start date:      Quit date: 2023     Years since quittin.5    Smokeless tobacco: Never    Tobacco comments:     Pt smoked about a pack and half for years and then to a pack and then to just weekends has quit in 2023   Vaping Use    Vaping Use: Never used   Substance Use Topics    Alcohol use: Not Currently    Drug use: Never       SCREENINGS        [place] Coma Scale  Eye Opening: Spontaneous  Best Verbal Response: Oriented  Best Motor Response: Obeys commands  Lucia Coma Scale Score: 15                CIWA Assessment  BP: (!) 148/97  Pulse: (!) 121           PHYSICAL EXAM  1 or more Elements     ED Triage Vitals   BP Temp Temp src Pulse Respirations SpO2 Height Weight   24 0845 24 0837 -- 24 0837 24 0845 24 0837 -- --   (!) 148/97 97.2 °F (36.2 °C)  (!) 121 24 (!) 85 %                 Constitutional/General: Alert and oriented x3  Head: Normocephalic and atraumatic  Eyes: PERRL, EOMI, sclera non icteric  ENT:  Oropharynx clear, handling secretions  Neck: Supple, full ROM, no stridor, no meningeal signs  Respiratory: Lungs with diffuse wheezing and rhonchi bilaterally. Increased work of breathing.   Cardiovascular: Tachycardic but regular rhythm. No murmurs, no gallops, no  rubs. 2+ distal pulses. Equal extremity pulses.   GI:  Abdomen Soft, Non tender, Non distended.  No rebound, guarding, or rigidity. No pulsatile masses.  Musculoskeletal: Moves all extremities x 4. Warm and well perfused, no clubbing, no cyanosis, no edema. Capillary refill <3 seconds  Integument: skin warm and dry. No rashes.   Neurologic: GCS 15, no focal deficits, symmetric strength 5/5 in the upper and lower extremities bilaterally  Psychiatric: Normal Affect            DIAGNOSTIC RESULTS   LABS: Interpreted by Nba Villagomez MD    Labs Reviewed   CBC WITH AUTO DIFFERENTIAL - Abnormal; Notable for the following components:       Result Value    MCHC 31.6 (*)     Eosinophils % 18 (*)     Eosinophils Absolute 1.36 (*)     All other components within normal limits   COMPREHENSIVE METABOLIC PANEL - Abnormal; Notable for the following components:    Glucose 147 (*)     Total Protein 8.9 (*)     Alkaline Phosphatase 109 (*)     All other components within normal limits   CULTURE, BLOOD 1   CULTURE, BLOOD 2   MAGNESIUM   TROPONIN   BRAIN NATRIURETIC PEPTIDE   TROPONIN   LACTATE, SEPSIS   LACTATE, SEPSIS       As interpreted by me, the above displayed labs are abnormal. All other labs obtained during this visit were within normal range or not returned as of this dictation.    RADIOLOGY:   Unless a wet read is documented in MDM or ED course,  non-plain film images such as CT, Ultrasound and MRI are read by the radiologist. Plain radiographic images are visualized and preliminarily interpreted by the ED Provider with the findings documented in the ED course:    Interpretation per the Radiologist below, if available at the time of this note:    XR CHEST PORTABLE   Final Result   1. Patchy bilateral perihilar and lower lobe airspace disease.           No results found.    No results found.         PAST MEDICAL HISTORY/Chronic Conditions Affecting Care      has no past medical history on file.     EMERGENCY DEPARTMENT COURSE   including coordination of care, and direct bedside care and excluding separately billable procedures.        I, Dr. Villagomez, am the primary provider of record      FINAL IMPRESSION      1. Pneumonia due to infectious organism, unspecified laterality, unspecified part of lung    2. Acute respiratory failure with hypoxia (HCC)          DISPOSITION/PLAN     DISPOSITION Admitted 06/17/2024 11:18:26 AM      PATIENT REFERRED TO:  No follow-up provider specified.    DISCHARGE MEDICATIONS:  New Prescriptions    No medications on file       DISCONTINUED MEDICATIONS:  Discontinued Medications    No medications on file              (Please note that portions of this note were completed with a voice recognition program.  Efforts were made to edit the dictations but occasionally words are mis-transcribed.)    Nba Villagomez MD (electronically signed)                 Nba Villagomez MD  06/17/24 [id number]

## 2024-06-17 NOTE — PROGRESS NOTES
Pharmacy Consultation Note  (Antibiotic Dosing and Monitoring)    Initial consult date: 6-  Consulting physician/provider: Dr. Garcia  Drug: Vancomycin  Indication: PNA (HAP)    Age/  Gender Height Weight IBW  Allergy Information   68 y.o./female 157.5 cm 79.4 kg (175 lb)     Ideal body weight: 50.1 kg (110 lb 7.2 oz)  Adjusted ideal body weight: 61.8 kg (136 lb 4.3 oz)   Penicillins      Renal Function:  Recent Labs     06/17/24  0915   BUN 10   CREATININE 0.5     No intake or output data in the 24 hours ending 06/17/24 1604    Vancomycin Monitoring:  Trough:  No results for input(s): \"VANCOTROUGH\" in the last 72 hours.  Random:  No results for input(s): \"VANCORANDOM\" in the last 72 hours.    Vancomycin Administration Times:  Recent vancomycin administrations                     vancomycin (VANCOCIN) 1500 mg in sodium chloride 0.9 % 250 mL IVPB (mg) 1,500 mg New Bag 06/17/24 1204                    Assessment:  69 yo/F, starting ATBs for PNA (HAP); presented to ED w/SOB and CP.    Empiric vanco and cefepime started in ED 6/17.  Received vancomycin 1500 mg x1 on 6/17 @ 1204.  Estimated Creatinine Clearance: 105 mL/min (based on SCr of 0.5 mg/dL).  To dose vancomycin, pharmacy will be utilizing [employer] calculation software for goal AUC/DALJIT 400-600 mg/L-hr (predicted AUC/DALJIT = 470, Tr =11.2 mcg/mL)    Plan:  Start vancomycin 1500 mg q24h on 6/18 AM.  Will check vancomycin level when appropriate.  Will continue to monitor renal function.   Pharmacy to follow.    Blaze Morris, PharmD  6/17/2024  4:06 PM  [phone]

## 2024-06-18 ENCOUNTER — APPOINTMENT (OUTPATIENT)
Age: 69
DRG: 189 | End: 2024-06-18
Payer: MEDICARE

## 2024-06-18 LAB
ANION GAP SERPL CALCULATED.3IONS-SCNC: 14 MMOL/L (ref 7–16)
B PARAP IS1001 DNA NPH QL NAA+NON-PROBE: NOT DETECTED
B PERT DNA SPEC QL NAA+PROBE: NOT DETECTED
BASOPHILS # BLD: 0.04 K/UL (ref 0–0.2)
BASOPHILS NFR BLD: 0 % (ref 0–2)
BUN SERPL-MCNC: 9 MG/DL (ref 6–23)
C PNEUM DNA NPH QL NAA+NON-PROBE: NOT DETECTED
CALCIUM SERPL-MCNC: 9.6 MG/DL (ref 8.6–10.2)
CHLORIDE SERPL-SCNC: 100 MMOL/L (ref 98–107)
CO2 SERPL-SCNC: 27 MMOL/L (ref 22–29)
CREAT SERPL-MCNC: 0.5 MG/DL (ref 0.5–1)
EOSINOPHIL # BLD: 0.03 K/UL (ref 0.05–0.5)
EOSINOPHILS RELATIVE PERCENT: 0 % (ref 0–6)
ERYTHROCYTE [DISTWIDTH] IN BLOOD BY AUTOMATED COUNT: 14.3 % (ref 11.5–15)
FLUAV RNA NPH QL NAA+NON-PROBE: NOT DETECTED
FLUBV RNA NPH QL NAA+NON-PROBE: NOT DETECTED
FOLATE SERPL-MCNC: 10.8 NG/ML (ref 4.8–24.2)
GFR, ESTIMATED: >90 ML/MIN/1.73M2
GLUCOSE BLD-MCNC: 112 MG/DL (ref 74–99)
GLUCOSE BLD-MCNC: 130 MG/DL (ref 74–99)
GLUCOSE BLD-MCNC: 162 MG/DL (ref 74–99)
GLUCOSE SERPL-MCNC: 164 MG/DL (ref 74–99)
HADV DNA NPH QL NAA+NON-PROBE: NOT DETECTED
HCOV 229E RNA NPH QL NAA+NON-PROBE: NOT DETECTED
HCOV HKU1 RNA NPH QL NAA+NON-PROBE: NOT DETECTED
HCOV NL63 RNA NPH QL NAA+NON-PROBE: NOT DETECTED
HCOV OC43 RNA NPH QL NAA+NON-PROBE: NOT DETECTED
HCT VFR BLD AUTO: 44.4 % (ref 34–48)
HGB BLD-MCNC: 14.3 G/DL (ref 11.5–15.5)
HMPV RNA NPH QL NAA+NON-PROBE: NOT DETECTED
HPIV1 RNA NPH QL NAA+NON-PROBE: NOT DETECTED
HPIV2 RNA NPH QL NAA+NON-PROBE: NOT DETECTED
HPIV3 RNA NPH QL NAA+NON-PROBE: NOT DETECTED
HPIV4 RNA NPH QL NAA+NON-PROBE: NOT DETECTED
IMM GRANULOCYTES # BLD AUTO: 0.13 K/UL (ref 0–0.58)
IMM GRANULOCYTES NFR BLD: 1 % (ref 0–5)
L PNEUMO1 AG UR QL IA.RAPID: NEGATIVE
LYMPHOCYTES NFR BLD: 2.95 K/UL (ref 1.5–4)
LYMPHOCYTES RELATIVE PERCENT: 23 % (ref 20–42)
M PNEUMO DNA NPH QL NAA+NON-PROBE: NOT DETECTED
MAGNESIUM SERPL-MCNC: 2.1 MG/DL (ref 1.6–2.6)
MCH RBC QN AUTO: 30.5 PG (ref 26–35)
MCHC RBC AUTO-ENTMCNC: 32.2 G/DL (ref 32–34.5)
MCV RBC AUTO: 94.7 FL (ref 80–99.9)
MONOCYTES NFR BLD: 0.78 K/UL (ref 0.1–0.95)
MONOCYTES NFR BLD: 6 % (ref 2–12)
NEUTROPHILS NFR BLD: 69 % (ref 43–80)
NEUTS SEG NFR BLD: 8.73 K/UL (ref 1.8–7.3)
PATH REV BLD -IMP: NORMAL
PHOSPHATE SERPL-MCNC: 4.5 MG/DL (ref 2.5–4.5)
PLATELET # BLD AUTO: 297 K/UL (ref 130–450)
PMV BLD AUTO: 9 FL (ref 7–12)
POTASSIUM SERPL-SCNC: 4.2 MMOL/L (ref 3.5–5)
RBC # BLD AUTO: 4.69 M/UL (ref 3.5–5.5)
RSV RNA NPH QL NAA+NON-PROBE: NOT DETECTED
RV+EV RNA NPH QL NAA+NON-PROBE: NOT DETECTED
S PNEUM AG SPEC QL: NEGATIVE
SARS-COV-2 RNA NPH QL NAA+NON-PROBE: NOT DETECTED
SODIUM SERPL-SCNC: 141 MMOL/L (ref 132–146)
SPECIMEN DESCRIPTION: NORMAL
SPECIMEN SOURCE: NORMAL
TROPONIN I SERPL HS-MCNC: 11 NG/L (ref 0–9)
VIT B12 SERPL-MCNC: 976 PG/ML (ref 211–946)
WBC OTHER # BLD: 12.7 K/UL (ref 4.5–11.5)

## 2024-06-18 PROCEDURE — 6360000002 HC RX W HCPCS: Performed by: STUDENT IN AN ORGANIZED HEALTH CARE EDUCATION/TRAINING PROGRAM

## 2024-06-18 PROCEDURE — 99222 1ST HOSP IP/OBS MODERATE 55: CPT | Performed by: INTERNAL MEDICINE

## 2024-06-18 PROCEDURE — 6360000002 HC RX W HCPCS

## 2024-06-18 PROCEDURE — 2580000003 HC RX 258: Performed by: STUDENT IN AN ORGANIZED HEALTH CARE EDUCATION/TRAINING PROGRAM

## 2024-06-18 PROCEDURE — 2060000000 HC ICU INTERMEDIATE R&B

## 2024-06-18 PROCEDURE — 2700000000 HC OXYGEN THERAPY PER DAY

## 2024-06-18 PROCEDURE — 6370000000 HC RX 637 (ALT 250 FOR IP)

## 2024-06-18 PROCEDURE — 87070 CULTURE OTHR SPECIMN AEROBIC: CPT

## 2024-06-18 PROCEDURE — 99221 1ST HOSP IP/OBS SF/LOW 40: CPT | Performed by: INTERNAL MEDICINE

## 2024-06-18 PROCEDURE — 0202U NFCT DS 22 TRGT SARS-COV-2: CPT

## 2024-06-18 PROCEDURE — 83735 ASSAY OF MAGNESIUM: CPT

## 2024-06-18 PROCEDURE — 85025 COMPLETE CBC W/AUTO DIFF WBC: CPT

## 2024-06-18 PROCEDURE — 94640 AIRWAY INHALATION TREATMENT: CPT

## 2024-06-18 PROCEDURE — 84100 ASSAY OF PHOSPHORUS: CPT

## 2024-06-18 PROCEDURE — 87205 SMEAR GRAM STAIN: CPT

## 2024-06-18 PROCEDURE — 82746 ASSAY OF FOLIC ACID SERUM: CPT

## 2024-06-18 PROCEDURE — 2580000003 HC RX 258

## 2024-06-18 PROCEDURE — 93306 TTE W/DOPPLER COMPLETE: CPT

## 2024-06-18 PROCEDURE — 80048 BASIC METABOLIC PNL TOTAL CA: CPT

## 2024-06-18 PROCEDURE — 84484 ASSAY OF TROPONIN QUANT: CPT

## 2024-06-18 PROCEDURE — 82962 GLUCOSE BLOOD TEST: CPT

## 2024-06-18 PROCEDURE — 82607 VITAMIN B-12: CPT

## 2024-06-18 RX ORDER — FLUTICASONE PROPIONATE 50 MCG
1 SPRAY, SUSPENSION (ML) NASAL DAILY PRN
Status: ON HOLD | COMMUNITY
End: 2024-06-23 | Stop reason: HOSPADM

## 2024-06-18 RX ORDER — CHOLECALCIFEROL (VITAMIN D3) 50 MCG
2000 TABLET ORAL DAILY
Status: DISCONTINUED | OUTPATIENT
Start: 2024-06-18 | End: 2024-06-23 | Stop reason: HOSPADM

## 2024-06-18 RX ORDER — ALBUTEROL SULFATE 2.5 MG/3ML
2.5 SOLUTION RESPIRATORY (INHALATION) EVERY 4 HOURS
Status: DISCONTINUED | OUTPATIENT
Start: 2024-06-18 | End: 2024-06-19

## 2024-06-18 RX ADMIN — BUDESONIDE INHALATION 500 MCG: 0.5 SUSPENSION RESPIRATORY (INHALATION) at 04:59

## 2024-06-18 RX ADMIN — CETIRIZINE HYDROCHLORIDE 10 MG: 10 TABLET, FILM COATED ORAL at 10:14

## 2024-06-18 RX ADMIN — ALBUTEROL SULFATE 2.5 MG: 2.5 SOLUTION RESPIRATORY (INHALATION) at 19:40

## 2024-06-18 RX ADMIN — CEFEPIME 2000 MG: 2 INJECTION, POWDER, FOR SOLUTION INTRAVENOUS at 18:03

## 2024-06-18 RX ADMIN — WATER 40 MG: 1 INJECTION INTRAMUSCULAR; INTRAVENOUS; SUBCUTANEOUS at 22:18

## 2024-06-18 RX ADMIN — SODIUM CHLORIDE, PRESERVATIVE FREE 10 ML: 5 INJECTION INTRAVENOUS at 10:21

## 2024-06-18 RX ADMIN — CEFEPIME 2000 MG: 2 INJECTION, POWDER, FOR SOLUTION INTRAVENOUS at 10:13

## 2024-06-18 RX ADMIN — IPRATROPIUM BROMIDE AND ALBUTEROL SULFATE 1 DOSE: 2.5; .5 SOLUTION RESPIRATORY (INHALATION) at 04:58

## 2024-06-18 RX ADMIN — HYDROCHLOROTHIAZIDE 12.5 MG: 25 TABLET ORAL at 10:14

## 2024-06-18 RX ADMIN — SODIUM CHLORIDE, PRESERVATIVE FREE 10 ML: 5 INJECTION INTRAVENOUS at 20:37

## 2024-06-18 RX ADMIN — AMLODIPINE BESYLATE 5 MG: 5 TABLET ORAL at 10:14

## 2024-06-18 RX ADMIN — CEFEPIME 2000 MG: 2 INJECTION, POWDER, FOR SOLUTION INTRAVENOUS at 02:13

## 2024-06-18 RX ADMIN — Medication 2000 UNITS: at 10:14

## 2024-06-18 RX ADMIN — ARFORMOTEROL TARTRATE 15 MCG: 15 SOLUTION RESPIRATORY (INHALATION) at 19:40

## 2024-06-18 RX ADMIN — BUDESONIDE INHALATION 500 MCG: 0.5 SUSPENSION RESPIRATORY (INHALATION) at 19:40

## 2024-06-18 RX ADMIN — IPRATROPIUM BROMIDE AND ALBUTEROL SULFATE 1 DOSE: 2.5; .5 SOLUTION RESPIRATORY (INHALATION) at 11:45

## 2024-06-18 RX ADMIN — ALBUTEROL SULFATE 2.5 MG: 2.5 SOLUTION RESPIRATORY (INHALATION) at 15:37

## 2024-06-18 RX ADMIN — WATER 40 MG: 1 INJECTION INTRAMUSCULAR; INTRAVENOUS; SUBCUTANEOUS at 10:14

## 2024-06-18 RX ADMIN — ARFORMOTEROL TARTRATE 15 MCG: 15 SOLUTION RESPIRATORY (INHALATION) at 04:59

## 2024-06-18 RX ADMIN — ATORVASTATIN CALCIUM 20 MG: 20 TABLET, FILM COATED ORAL at 10:15

## 2024-06-18 RX ADMIN — ENOXAPARIN SODIUM 40 MG: 100 INJECTION SUBCUTANEOUS at 10:15

## 2024-06-18 RX ADMIN — VANCOMYCIN HYDROCHLORIDE 1500 MG: 10 INJECTION, POWDER, LYOPHILIZED, FOR SOLUTION INTRAVENOUS at 14:15

## 2024-06-18 ASSESSMENT — PAIN SCALES - GENERAL: PAINLEVEL_OUTOF10: 0

## 2024-06-18 NOTE — PROGRESS NOTES
[facility]  Internal Medicine Residency Program  Progress Note - House Team       Patient:  Estefani Chiu 68 y.o. female   MRN: [id number]       Date of Service: 6/18/2024    Subjective   Patient was seen and examined at bedside this morning, was sitting in bed, not in apparent distress, on O2 @5L via NC.   No new complaints, said there was only mile improvement on s/s since admission yesterday.  No significant or concerning events otherwise.    Objective       Physical Exam  Vitals: /76   Pulse 100   Temp 97.3 °F (36.3 °C) (Temporal)   Resp 18   Wt 79.4 kg (175 lb)   SpO2 90%   BMI 32.01 kg/m²     I & O - 24hr: I/O this shift:  In: 240 [P.O.:240]  Out: -    General Appearance: alert, appears stated age, and cooperative  HEENT:  Head: Normal, normocephalic, atraumatic.  Lung: B/L decreased air entry with diffuse mid to end expiratory wheeze  Heart: Tachycardia, s1s2  Abdomen: soft, non-tender; bowel sounds normal; no masses,  no organomegaly  Extremities:  extremities normal, atraumatic, no cyanosis or edema  Neurologic: Mental status: Alert, oriented, thought content appropriate    Diet:   ADULT DIET; Regular      Pertinent Labs & Imaging Studies     Labs    CBC with Differential:    Lab Results   Component Value Date/Time    WBC 12.7 06/18/2024 05:04 AM    RBC 4.69 06/18/2024 05:04 AM    HGB 14.3 06/18/2024 05:04 AM    HCT 44.4 06/18/2024 05:04 AM     06/18/2024 05:04 AM    MCV 94.7 06/18/2024 05:04 AM    MCH 30.5 06/18/2024 05:04 AM    MCHC 32.2 06/18/2024 05:04 AM    RDW 14.3 06/18/2024 05:04 AM    LYMPHOPCT 23 06/18/2024 05:04 AM    MONOPCT 6 06/18/2024 05:04 AM    EOSPCT 0 06/18/2024 05:04 AM    BASOPCT 0 06/18/2024 05:04 AM    MONOSABS 0.78 06/18/2024 05:04 AM    LYMPHSABS 3.15 01/07/2014 11:17 AM    EOSABS 0.03 06/18/2024 05:04 AM    BASOSABS 0.04 06/18/2024 05:04 AM     Hemoglobin/Hematocrit:    Lab Results   Component Value Date/Time    HGB 14.3 06/18/2024 05:04  imaging  Class 1 obesity  H/O tobacco abuse Smoker    Hilar and Mediastinal Adenopathy  on past imaging  Elevated IgE  Past H/O stroke   Vit D deficiency    Monitor respiratory status, wean oxygen to baseline if tolerated  On IV steroids, to continue; might require dosing treatment  Patient currently on cefepime and vancomycin, does not have any clinical or bacteriological signs of infection, plan to discontinue antibiotic  Follow echocardiogram  Continue breathing treatments, Brovana Pulmicort and DuoNeb  On low-dose sliding scale  Pulmonology consulted, appreciate recommendation    DVT prophylaxis: Lovenox  Diet:   No diet orders on file   Bowel regimen: PRN  Pain management: as needed  Code status: No Order   Disposition: Admission to [facility]  Family: updated as available    Jaylene Wood MD, PGY-1   Attending physician: Dr. Velez

## 2024-06-18 NOTE — ED NOTES
Updated Dr. Velez on patient's current status. Patient is short of breath, states that \" this happens every morning at this time.\" Verbal order given for duoneb and solumedrol. Respiratory was notified, times have been adjusted for solumedrol.

## 2024-06-18 NOTE — PROGRESS NOTES
Patient seen by Dr. Sandoval in 12/2023.  Consult will be added on to her list.    Candido Hernandez MD  6/18/2024  8:51 AM

## 2024-06-18 NOTE — PROGRESS NOTES
Pharmacy Consultation Note  (Antibiotic Dosing and Monitoring)    Initial consult date: 6-  Consulting physician/provider: Dr. Garcia  Drug: Vancomycin  Indication: PNA (HAP)    Age/  Gender Height Weight IBW  Allergy Information   68 y.o./female 157.5 cm 79.4 kg (175 lb)     Ideal body weight: 50.1 kg (110 lb 7.2 oz)  Adjusted ideal body weight: 61.8 kg (136 lb 4.3 oz)   Penicillins      Renal Function:  Recent Labs     06/17/24  0915 06/18/24  0504   BUN 10 9   CREATININE 0.5 0.5         Intake/Output Summary (Last 24 hours) at 6/18/2024 1536  Last data filed at 6/18/2024 0820  Gross per 24 hour   Intake 240 ml   Output --   Net 240 ml       Vancomycin Monitoring:  Trough:  No results for input(s): \"VANCOTROUGH\" in the last 72 hours.  Random:  No results for input(s): \"VANCORANDOM\" in the last 72 hours.    Vancomycin Administration Times:  Recent vancomycin administrations                     vancomycin (VANCOCIN) 1500 mg in sodium chloride 0.9 % 250 mL IVPB (mg) 1,500 mg New Bag 06/18/24 1415    vancomycin (VANCOCIN) 1500 mg in sodium chloride 0.9 % 250 mL IVPB (mg) 1,500 mg New Bag 06/17/24 1204             Assessment:  67 yo/F, starting ATBs for PNA (HAP); presented to ED w/SOB and CP.    Empiric vanco and cefepime started in ED 6/17.  Received vancomycin 1500 mg x1 on 6/17 @ 1204.  Estimated Creatinine Clearance: 105 mL/min (based on SCr of 0.5 mg/dL).  To dose vancomycin, pharmacy will be utilizing [employer] calculation software for goal AUC/DALJIT 400-600 mg/L-hr (predicted AUC/DALJIT = 470, Tr =11.2 mcg/mL).  6/18: day #2 vanco.  PCT = 0.04 on 6/17; WBC increased but started steroids yesterday, afebrile.   Plan:  Continue vancomycin 1500 mg q24h.  Will check vancomycin level when appropriate.  Will continue to monitor renal function.   Pharmacy to follow.    Blaze Morris, PharmD  6/18/2024  3:36 PM  [phone]

## 2024-06-18 NOTE — PROGRESS NOTES
4 Eyes Skin Assessment     NAME:  Estefani Chiu  YOB: 1955  MEDICAL RECORD NUMBER:  [id number]    The patient is being assessed for  Admission    I agree that at least one RN has performed a thorough Head to Toe Skin Assessment on the patient. ALL assessment sites listed below have been assessed.      Areas assessed by both nurses:    Head, Face, Ears, Shoulders, Back, Chest, Arms, Elbows, Hands, Sacrum. Buttock, Coccyx, Ischium, and Legs. Feet and Heels        Does the Patient have a Wound? No noted wound(s)       Dustin Prevention initiated by RN: Yes  Wound Care Orders initiated by RN: No    Pressure Injury (Stage 3,4, Unstageable, DTI, NWPT, and Complex wounds) if present, place Wound referral order by RN under : No    New Ostomies, if present place, Ostomy referral order under : No     Nurse 1 eSignature: Electronically signed by Elsie Bustillo RN on 6/18/24 at 7:02 PM EDT    **SHARE this note so that the co-signing nurse can place an eSignature**    Nurse 2 eSignature: {Esignature:[id number]}

## 2024-06-18 NOTE — CONSULTS
Pulmonary/Critical Care Medicine  Resident Consult Note    Reason for Consult:  Recurrent pneumonia and increase eosinophil count    Requesting Physician: Dr. Garcia    CHIEF COMPLAINT:   Chief Complaint   Patient presents with    Chest Pain     Chest tightness 2 days ago- worsens with exertion    Shortness of Breath     85% on normal 4 L       HISTORY OF PRESENT ILLNESS: 68 y.o. old extremely pleasant female with PMHx of restrictive lung disease, DM2, HTN presented to [facility] ED on 6/17/24 for shortness of breath and chest pain. Patient has had multiple admissions for SOB and found to have restrictive lung disease on PFTs from May 2024.     She has been prescribed Breztri and albuterol but admits to self-titrating meds and choosing to stop oxygen. She states that her symptoms have recently been well controlled since April, so she discontinued her inhalers. She was seen in pulmonology clinic on 6/4/24 and reported feeling well and being symptom free. She states that around 6/12/24 her symptoms reappeared and have progressively worsened since that time. She was using \"one of the inhalers three times a day and it did not do anything, and the breathing treatments throughout the day and they helped a little bit.\" She was ordered echo as outpatient but did not complete.     Prior workup in December showed elevated IgE level and hilar lymphadenopathy. Plan was for follow up CT scan to assess if lymphadenopathy persisted and possible bronch/EBUS to rule out sarcoidosis. ACE levels were normal in December 2023. Patient had a CTA this admission already showing the lymphadenopathy remains.     Patient works as a cook for [employer] and previously reported worsened symptoms at work. She states she often went out to her car to do a \"portable breathing treatment\" if symptoms were severe at work.     She is a former smoker. She previously reported quitting in October however she admitted to me today that her last cigarette was actually  extremities normal, atraumatic, no cyanosis or edema    Review of Systems  Pertinent items are noted in HPI.    Objective:     Patient Vitals for the past 8 hrs:   BP Temp Temp src Pulse Resp SpO2   06/18/24 0756 (!) 140/65 97.2 °F (36.2 °C) Temporal 82 18 92 %   06/18/24 0700 129/66 -- -- 96 24 94 %   06/18/24 0600 121/62 -- -- 94 23 94 %   06/18/24 0530 126/77 -- -- (!) 102 22 91 %   06/18/24 0500 139/70 -- -- (!) 103 15 96 %   06/18/24 0458 -- -- -- (!) 102 28 98 %   06/18/24 0457 -- -- -- (!) 102 (!) 32 99 %   06/18/24 0456 -- -- -- (!) 107 24 99 %   06/18/24 0430 135/78 -- -- (!) 120 23 (!) 86 %   06/18/24 0400 (!) 177/90 -- -- (!) 113 (!) 38 (!) 88 %   06/18/24 0330 124/60 -- -- 89 26 98 %   06/18/24 0300 132/68 -- -- 84 20 98 %   06/18/24 0200 107/64 -- -- 81 23 96 %           ECG: sinus tachycardia  CXR: Pathcy BL perihilar and lower lobe airspace disease  Data Review  CBC with Differential:    Lab Results   Component Value Date/Time    WBC 12.7 06/18/2024 05:04 AM    RBC 4.69 06/18/2024 05:04 AM    HGB 14.3 06/18/2024 05:04 AM    HCT 44.4 06/18/2024 05:04 AM     06/18/2024 05:04 AM    MCV 94.7 06/18/2024 05:04 AM    MCH 30.5 06/18/2024 05:04 AM    MCHC 32.2 06/18/2024 05:04 AM    RDW 14.3 06/18/2024 05:04 AM     BMP:   Lab Results   Component Value Date/Time    GLUCOSE 164 06/18/2024 05:04 AM    CO2 27 06/18/2024 05:04 AM    BUN 9 06/18/2024 05:04 AM    CREATININE 0.5 06/18/2024 05:04 AM    CALCIUM 9.6 06/18/2024 05:04 AM       Assessment:    Acute hypoxic respiratory failure  Orthopnea  Hilar lymphadenopathy  BL ground glass opacities in lower lobes  Elevated IgE level  HTN  Diabetes    Plan:  Echo performed this morning, awaiting read  Will need bronchoscopy +/- EBUS as outpatient to rule out sarcoidosis  Obtain DAKOTA  Increased solu-medrol to 40mg q12h  Bronchodilators  Albuterol neb every 4 hours  PEP/flutter        Thank You Dr. Garcia provider found for involving me in the management this  DO

## 2024-06-18 NOTE — PROGRESS NOTES
[facility]  Internal Medicine Residency / House Medicine Service    Attending Physician Statement  I have discussed the case, including pertinent history and exam findings with the resident and the team.  I have seen and examined the patient and the key elements of the encounter have been performed by me.  I agree with the assessment, plan and orders as documented by the resident.      Continues with bilateral expiratory and mild inspiratory wheezing  On 5 liiters NC   VS stable   Has high IgE. , Eosinophilia  CTA lung with diffuse ground glass densities \  Lymphadenopathy stable   And viral studies negative  Meds reviewed on steroids   Plan; Continue same with Cefepime and Vancomycin           Pulmonary consult  Remainder of medical problems as per resident note.      Leonidas Velez MD FRCP [facility]  Internal Medicine Residency Faculty

## 2024-06-19 LAB
ANION GAP SERPL CALCULATED.3IONS-SCNC: 10 MMOL/L (ref 7–16)
BASOPHILS # BLD: 0.04 K/UL (ref 0–0.2)
BASOPHILS NFR BLD: 0 % (ref 0–2)
BUN SERPL-MCNC: 13 MG/DL (ref 6–23)
CALCIUM SERPL-MCNC: 9.5 MG/DL (ref 8.6–10.2)
CHLORIDE SERPL-SCNC: 100 MMOL/L (ref 98–107)
CO2 SERPL-SCNC: 32 MMOL/L (ref 22–29)
CREAT SERPL-MCNC: 0.5 MG/DL (ref 0.5–1)
ECHO AO ASC DIAM: 3.1 CM
ECHO AV AREA PEAK VELOCITY: 2.7 CM2
ECHO AV AREA VTI: 2.6 CM2
ECHO AV CUSP MM: 1.4 CM
ECHO AV MEAN GRADIENT: 11 MMHG
ECHO AV MEAN VELOCITY: 1.6 M/S
ECHO AV PEAK GRADIENT: 19 MMHG
ECHO AV PEAK VELOCITY: 2.2 M/S
ECHO AV VELOCITY RATIO: 0.77
ECHO AV VTI: 43.4 CM
ECHO LA DIAMETER: 2.6 CM
ECHO LA VOL A-L A2C: 28 ML (ref 22–52)
ECHO LA VOL A-L A4C: 33 ML (ref 22–52)
ECHO LA VOL MOD A2C: 27 ML (ref 22–52)
ECHO LA VOL MOD A4C: 31 ML (ref 22–52)
ECHO LA VOLUME AREA LENGTH: 31 ML
ECHO LV EDV A2C: 73 ML
ECHO LV EDV A4C: 54 ML
ECHO LV EDV BP: 65 ML (ref 56–104)
ECHO LV EJECTION FRACTION A2C: 75 %
ECHO LV EJECTION FRACTION A4C: 69 %
ECHO LV EJECTION FRACTION BIPLANE: 73 % (ref 55–100)
ECHO LV ESV A2C: 18 ML
ECHO LV ESV A4C: 17 ML
ECHO LV ESV BP: 17 ML (ref 19–49)
ECHO LV FRACTIONAL SHORTENING: 14 % (ref 28–44)
ECHO LV INTERNAL DIMENSION DIASTOLIC: 3.7 CM (ref 3.9–5.3)
ECHO LV INTERNAL DIMENSION SYSTOLIC: 3.2 CM
ECHO LV IVSD: 1.3 CM (ref 0.6–0.9)
ECHO LV IVSS: 1 CM
ECHO LV MASS 2D: 147.3 G (ref 67–162)
ECHO LV POSTERIOR WALL DIASTOLIC: 1.1 CM (ref 0.6–0.9)
ECHO LV POSTERIOR WALL SYSTOLIC: 1.5 CM
ECHO LV RELATIVE WALL THICKNESS RATIO: 0.59
ECHO LVOT AREA: 3.5 CM2
ECHO LVOT AV VTI INDEX: 0.77
ECHO LVOT DIAM: 2.1 CM
ECHO LVOT MEAN GRADIENT: 8 MMHG
ECHO LVOT PEAK GRADIENT: 12 MMHG
ECHO LVOT PEAK VELOCITY: 1.7 M/S
ECHO LVOT SV: 116.3 ML
ECHO LVOT VTI: 33.6 CM
ECHO MV A VELOCITY: 1.08 M/S
ECHO MV AREA PHT: 4.1 CM2
ECHO MV AREA VTI: 3.8 CM2
ECHO MV E DECELERATION TIME (DT): 209.3 MS
ECHO MV E VELOCITY: 0.7 M/S
ECHO MV E/A RATIO: 0.65
ECHO MV LVOT VTI INDEX: 0.92
ECHO MV MAX VELOCITY: 1.2 M/S
ECHO MV MEAN GRADIENT: 2 MMHG
ECHO MV MEAN VELOCITY: 0.7 M/S
ECHO MV PEAK GRADIENT: 5 MMHG
ECHO MV PRESSURE HALF TIME (PHT): 53.1 MS
ECHO MV VTI: 30.8 CM
ECHO PV MAX VELOCITY: 1 M/S
ECHO PV MEAN GRADIENT: 2 MMHG
ECHO PV MEAN VELOCITY: 0.7 M/S
ECHO PV PEAK GRADIENT: 4 MMHG
ECHO PV VTI: 22.5 CM
ECHO PVEIN PEAK S VELOCITY: 0.4 M/S
ECHO RV INTERNAL DIMENSION: 3.6 CM
ECHO RVOT MEAN GRADIENT: 2 MMHG
ECHO RVOT PEAK GRADIENT: 3 MMHG
ECHO RVOT PEAK VELOCITY: 0.9 M/S
ECHO RVOT VTI: 17.9 CM
EOSINOPHIL # BLD: 0.02 K/UL (ref 0.05–0.5)
EOSINOPHILS RELATIVE PERCENT: 0 % (ref 0–6)
ERYTHROCYTE [DISTWIDTH] IN BLOOD BY AUTOMATED COUNT: 14.4 % (ref 11.5–15)
GFR, ESTIMATED: >90 ML/MIN/1.73M2
GLUCOSE BLD-MCNC: 115 MG/DL (ref 74–99)
GLUCOSE BLD-MCNC: 130 MG/DL (ref 74–99)
GLUCOSE BLD-MCNC: 147 MG/DL (ref 74–99)
GLUCOSE BLD-MCNC: 259 MG/DL (ref 74–99)
GLUCOSE SERPL-MCNC: 147 MG/DL (ref 74–99)
HCT VFR BLD AUTO: 43.4 % (ref 34–48)
HGB BLD-MCNC: 13.9 G/DL (ref 11.5–15.5)
IGE SERPL-ACNC: 7903 IU/ML (ref 0–100)
IMM GRANULOCYTES # BLD AUTO: 0.06 K/UL (ref 0–0.58)
IMM GRANULOCYTES NFR BLD: 1 % (ref 0–5)
LYMPHOCYTES NFR BLD: 2.25 K/UL (ref 1.5–4)
LYMPHOCYTES RELATIVE PERCENT: 25 % (ref 20–42)
MAGNESIUM SERPL-MCNC: 2.1 MG/DL (ref 1.6–2.6)
MCH RBC QN AUTO: 30.9 PG (ref 26–35)
MCHC RBC AUTO-ENTMCNC: 32 G/DL (ref 32–34.5)
MCV RBC AUTO: 96.4 FL (ref 80–99.9)
MICROORGANISM/AGENT SPEC: ABNORMAL
MONOCYTES NFR BLD: 0.3 K/UL (ref 0.1–0.95)
MONOCYTES NFR BLD: 3 % (ref 2–12)
NEUTROPHILS NFR BLD: 70 % (ref 43–80)
NEUTS SEG NFR BLD: 6.22 K/UL (ref 1.8–7.3)
PHOSPHATE SERPL-MCNC: 4.1 MG/DL (ref 2.5–4.5)
PLATELET # BLD AUTO: 280 K/UL (ref 130–450)
PMV BLD AUTO: 9.1 FL (ref 7–12)
POTASSIUM SERPL-SCNC: 4.4 MMOL/L (ref 3.5–5)
RBC # BLD AUTO: 4.5 M/UL (ref 3.5–5.5)
SERVICE CMNT-IMP: ABNORMAL
SODIUM SERPL-SCNC: 142 MMOL/L (ref 132–146)
SPECIMEN DESCRIPTION: ABNORMAL
WBC OTHER # BLD: 8.9 K/UL (ref 4.5–11.5)

## 2024-06-19 PROCEDURE — 6360000002 HC RX W HCPCS

## 2024-06-19 PROCEDURE — 83516 IMMUNOASSAY NONANTIBODY: CPT

## 2024-06-19 PROCEDURE — 80048 BASIC METABOLIC PNL TOTAL CA: CPT

## 2024-06-19 PROCEDURE — 85025 COMPLETE CBC W/AUTO DIFF WBC: CPT

## 2024-06-19 PROCEDURE — 87305 ASPERGILLUS AG IA: CPT

## 2024-06-19 PROCEDURE — 83735 ASSAY OF MAGNESIUM: CPT

## 2024-06-19 PROCEDURE — 6370000000 HC RX 637 (ALT 250 FOR IP)

## 2024-06-19 PROCEDURE — 84100 ASSAY OF PHOSPHORUS: CPT

## 2024-06-19 PROCEDURE — 2700000000 HC OXYGEN THERAPY PER DAY

## 2024-06-19 PROCEDURE — 36415 COLL VENOUS BLD VENIPUNCTURE: CPT

## 2024-06-19 PROCEDURE — 2580000003 HC RX 258: Performed by: STUDENT IN AN ORGANIZED HEALTH CARE EDUCATION/TRAINING PROGRAM

## 2024-06-19 PROCEDURE — 2060000000 HC ICU INTERMEDIATE R&B

## 2024-06-19 PROCEDURE — 99232 SBSQ HOSP IP/OBS MODERATE 35: CPT | Performed by: INTERNAL MEDICINE

## 2024-06-19 PROCEDURE — 82962 GLUCOSE BLOOD TEST: CPT

## 2024-06-19 PROCEDURE — 93306 TTE W/DOPPLER COMPLETE: CPT | Performed by: INTERNAL MEDICINE

## 2024-06-19 PROCEDURE — 2580000003 HC RX 258

## 2024-06-19 PROCEDURE — 6360000002 HC RX W HCPCS: Performed by: STUDENT IN AN ORGANIZED HEALTH CARE EDUCATION/TRAINING PROGRAM

## 2024-06-19 PROCEDURE — 87389 HIV-1 AG W/HIV-1&-2 AB AG IA: CPT

## 2024-06-19 PROCEDURE — 99231 SBSQ HOSP IP/OBS SF/LOW 25: CPT | Performed by: INTERNAL MEDICINE

## 2024-06-19 PROCEDURE — 94640 AIRWAY INHALATION TREATMENT: CPT

## 2024-06-19 RX ORDER — ALBUTEROL SULFATE 2.5 MG/3ML
2.5 SOLUTION RESPIRATORY (INHALATION) EVERY 6 HOURS
Status: DISCONTINUED | OUTPATIENT
Start: 2024-06-19 | End: 2024-06-23 | Stop reason: HOSPADM

## 2024-06-19 RX ORDER — ACETYLCYSTEINE 200 MG/ML
600 SOLUTION ORAL; RESPIRATORY (INHALATION)
Status: DISCONTINUED | OUTPATIENT
Start: 2024-06-19 | End: 2024-06-23 | Stop reason: HOSPADM

## 2024-06-19 RX ADMIN — ARFORMOTEROL TARTRATE 15 MCG: 15 SOLUTION RESPIRATORY (INHALATION) at 20:08

## 2024-06-19 RX ADMIN — ACETYLCYSTEINE 600 MG: 200 SOLUTION ORAL; RESPIRATORY (INHALATION) at 20:07

## 2024-06-19 RX ADMIN — Medication 2000 UNITS: at 08:55

## 2024-06-19 RX ADMIN — ALBUTEROL SULFATE 2.5 MG: 2.5 SOLUTION RESPIRATORY (INHALATION) at 17:05

## 2024-06-19 RX ADMIN — ATORVASTATIN CALCIUM 20 MG: 20 TABLET, FILM COATED ORAL at 08:55

## 2024-06-19 RX ADMIN — CETIRIZINE HYDROCHLORIDE 10 MG: 10 TABLET, FILM COATED ORAL at 08:56

## 2024-06-19 RX ADMIN — ACETYLCYSTEINE 600 MG: 200 SOLUTION ORAL; RESPIRATORY (INHALATION) at 11:24

## 2024-06-19 RX ADMIN — ARFORMOTEROL TARTRATE 15 MCG: 15 SOLUTION RESPIRATORY (INHALATION) at 05:40

## 2024-06-19 RX ADMIN — BUDESONIDE INHALATION 500 MCG: 0.5 SUSPENSION RESPIRATORY (INHALATION) at 05:40

## 2024-06-19 RX ADMIN — ALBUTEROL SULFATE 2.5 MG: 2.5 SOLUTION RESPIRATORY (INHALATION) at 03:00

## 2024-06-19 RX ADMIN — AMLODIPINE BESYLATE 5 MG: 5 TABLET ORAL at 08:55

## 2024-06-19 RX ADMIN — SODIUM CHLORIDE, PRESERVATIVE FREE 10 ML: 5 INJECTION INTRAVENOUS at 22:04

## 2024-06-19 RX ADMIN — ENOXAPARIN SODIUM 40 MG: 100 INJECTION SUBCUTANEOUS at 08:56

## 2024-06-19 RX ADMIN — FLUTICASONE PROPIONATE 1 SPRAY: 50 SPRAY, METERED NASAL at 22:04

## 2024-06-19 RX ADMIN — HYDROCHLOROTHIAZIDE 12.5 MG: 25 TABLET ORAL at 08:56

## 2024-06-19 RX ADMIN — WATER 40 MG: 1 INJECTION INTRAMUSCULAR; INTRAVENOUS; SUBCUTANEOUS at 08:56

## 2024-06-19 RX ADMIN — ALBUTEROL SULFATE 2.5 MG: 2.5 SOLUTION RESPIRATORY (INHALATION) at 20:08

## 2024-06-19 RX ADMIN — BUDESONIDE INHALATION 500 MCG: 0.5 SUSPENSION RESPIRATORY (INHALATION) at 20:07

## 2024-06-19 RX ADMIN — WATER 40 MG: 1 INJECTION INTRAMUSCULAR; INTRAVENOUS; SUBCUTANEOUS at 22:04

## 2024-06-19 RX ADMIN — CEFEPIME 2000 MG: 2 INJECTION, POWDER, FOR SOLUTION INTRAVENOUS at 08:58

## 2024-06-19 RX ADMIN — ALBUTEROL SULFATE 2.5 MG: 2.5 SOLUTION RESPIRATORY (INHALATION) at 11:24

## 2024-06-19 RX ADMIN — CEFEPIME 2000 MG: 2 INJECTION, POWDER, FOR SOLUTION INTRAVENOUS at 00:34

## 2024-06-19 RX ADMIN — SODIUM CHLORIDE, PRESERVATIVE FREE 10 ML: 5 INJECTION INTRAVENOUS at 09:01

## 2024-06-19 RX ADMIN — ALBUTEROL SULFATE 2.5 MG: 2.5 SOLUTION RESPIRATORY (INHALATION) at 05:39

## 2024-06-19 ASSESSMENT — PAIN SCALES - GENERAL
PAINLEVEL_OUTOF10: 0
PAINLEVEL_OUTOF10: 0

## 2024-06-19 NOTE — PLAN OF CARE
Problem: Discharge Planning  Goal: Discharge to home or other facility with appropriate resources  6/19/2024 0950 by Maria Dolores Pickard RN  Outcome: Progressing  6/18/2024 2223 by You Crisostomo RN  Outcome: Progressing     Problem: Safety - Adult  Goal: Free from fall injury  6/19/2024 0950 by Maria Dolores Pickard RN  Outcome: Progressing  6/18/2024 2223 by You Crisostomo RN  Outcome: Progressing     Problem: Chronic Conditions and Co-morbidities  Goal: Patient's chronic conditions and co-morbidity symptoms are monitored and maintained or improved  6/19/2024 0950 by Maria Dolores Pickard RN  Outcome: Progressing  6/18/2024 2223 by You Crisostomo RN  Outcome: Progressing     Problem: Skin/Tissue Integrity  Goal: Absence of new skin breakdown  Description: 1.  Monitor for areas of redness and/or skin breakdown  2.  Assess vascular access sites hourly  3.  Every 4-6 hours minimum:  Change oxygen saturation probe site  4.  Every 4-6 hours:  If on nasal continuous positive airway pressure, respiratory therapy assess nares and determine need for appliance change or resting period.  Outcome: Progressing     Problem: Pain  Goal: Verbalizes/displays adequate comfort level or baseline comfort level  Outcome: Progressing

## 2024-06-19 NOTE — PROGRESS NOTES
Pulmonary/Critical Care Medicine  Resident Consult Note    Reason for Consult:  Recurrent pneumonia and increase eosinophil count    Requesting Physician: Dr. Garcia    SUBJECTIVE?:    Improved  No pain       PHYSICAL EXAM:    Vitals:    /70   Pulse 85   Temp 98.6 °F (37 °C) (Temporal)   Resp 24   Wt 79.4 kg (175 lb)   SpO2 99%   BMI 32.01 kg/m²     General appearance: alert and cooperative  Head: Normocephalic, without obvious abnormality  Nose: Nares normal. Septum midline. Mucosa normal. No drainage or sinus tenderness.  Throat: lips, mucosa, and tongue normal; teeth and gums normal  Neck: no JVD  Lungs: wheezes anterior - bilateral  Heart: regular rate and rhythm, S1, S2 normal, no murmur, click, rub or gallop  Abdomen: soft, non-tender; bowel sounds normal; no masses,  no organomegaly  Extremities: extremities normal, atraumatic, no cyanosis or edema      Objective:     Patient Vitals for the past 8 hrs:   BP Temp Temp src Pulse Resp SpO2   06/19/24 1128 -- -- -- 85 24 99 %   06/19/24 1125 -- -- -- 85 24 --   06/19/24 1108 133/70 98.6 °F (37 °C) Temporal 88 16 96 %   06/19/24 0758 139/68 97.6 °F (36.4 °C) Temporal 97 18 95 %             ECG: sinus tachycardia  CXR: Pathcy BL perihilar and lower lobe airspace disease    ECHO:    Left Ventricle: Hyperdynamic left ventricular systolic function with a visually estimated EF of 65 - 70%. Findings consistent with mild asymmetric hypertrophy. Elevated left ventricular filling pressure. LVOT   obstruction at rest. LVOT peak gradient at rest is 12 mmHg.     Right Ventricle: Right ventricle size is normal. Normal systolic   function.    Aortic Valve: No stenosis. AV area by continuity VTI is 2.6 cm2. AV   area by peak velocity is 2.7 cm2.     Mitral Valve: MV mean gradient is 2 mmHg. Mild regurgitation. No   stenosis noted. MV mean gradient is 2 mmHg. MV area by continuity equation is 3.8 cm2.     Tricuspid Valve: Mild regurgitation.     Pulmonic Valve: Trace  regurgitation.     Image quality is fair.       Data Review  CBC with Differential:    Lab Results   Component Value Date/Time    WBC 8.9 06/19/2024 06:25 AM    RBC 4.50 06/19/2024 06:25 AM    HGB 13.9 06/19/2024 06:25 AM    HCT 43.4 06/19/2024 06:25 AM     06/19/2024 06:25 AM    MCV 96.4 06/19/2024 06:25 AM    MCH 30.9 06/19/2024 06:25 AM    MCHC 32.0 06/19/2024 06:25 AM    RDW 14.4 06/19/2024 06:25 AM     BMP:   Lab Results   Component Value Date/Time    GLUCOSE 147 06/19/2024 06:25 AM    CO2 32 06/19/2024 06:25 AM    BUN 13 06/19/2024 06:25 AM    CREATININE 0.5 06/19/2024 06:25 AM    CALCIUM 9.5 06/19/2024 06:25 AM       Assessment:    Acute hypoxic respiratory failure  Orthopnea  Elevated IgE level > 100 rule out APBA,and EGS  Hilar adenopathy  Hypertension  Diabetes  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   Check Ig G aspergilous antibodies  Increased solu-medrol to 40mg q12h  Bronchodilators  Albuterol neb every 4 hours  PEP/flutter    tjb

## 2024-06-19 NOTE — CARE COORDINATION
6/19/24 CM Note.  Pt admitted 6/17/24 Dx PN.  On O2 5L/NC, IV ATB, IV Solumedrol.  DME includes O2 4L provided by [facility] and a nebulizer.  Dr Garcia is PCP.  [facility] is preferred pharmacy.  Discharge plans to return home  with daughters.  Family will provide transportation.   Elif VENTURAN RN-BC  [phone]

## 2024-06-19 NOTE — PROGRESS NOTES
Clinical Pharmacy Note    Vancomycin was stopped today by Dr. Suresh (IM Team 1).  D/w IM Team 1 on rounds this morning.    Clinical Pharmacy sign off.     Blaze Morris PharmD  6/19/2024  1:30 PM  [phone]

## 2024-06-19 NOTE — PROGRESS NOTES
[facility]  Internal Medicine Residency Program  Progress Note  - House Team 1    Patient:  Estefani Chiu 68 y.o. female MRN: [id number]     Date of Service: 6/19/2024     CC: SOB w cough  Overnight events: none    Subjective       Patient examined at bedside. Patient complained of cough overnight that prevents her from sleeping. She complained of a cough that brought up a small amount red fluid this AM, a small amount of hard yellow mucus. Patient does feel better than prior to admission.   Plan to add mucomyst    Objective     Physical Exam:  Vitals: /68   Pulse 97   Temp 97.6 °F (36.4 °C) (Temporal)   Resp 18   Wt 79.4 kg (175 lb)   SpO2 95%   BMI 32.01 kg/m²     I & O - 24hr: I/O this shift:  In: 120 [P.O.:120]  Out: -    General Appearance: alert, appears stated age, and cooperative  HEENT:  Head: Normal, normocephalic, atraumatic.  Lung: B/L decreased air entry with diffuse mid to end expiratory wheeze in upper lung fields, BL rhonchi in middle and inf lung fields, rales in upper R lung fields  Heart: Tachycardia, s1s2  Abdomen: soft, non-tender; bowel sounds normal; no masses,  no organomegaly  Extremities:  extremities normal, atraumatic, no cyanosis or edema  Neurologic: Mental status: Alert, oriented, thought content appropriate    Pertinent Labs & Imaging Studies     CBC:   Lab Results   Component Value Date/Time    WBC 8.9 06/19/2024 06:25 AM    RBC 4.50 06/19/2024 06:25 AM    HGB 13.9 06/19/2024 06:25 AM    HCT 43.4 06/19/2024 06:25 AM    MCV 96.4 06/19/2024 06:25 AM    MCH 30.9 06/19/2024 06:25 AM    MCHC 32.0 06/19/2024 06:25 AM    RDW 14.4 06/19/2024 06:25 AM     06/19/2024 06:25 AM    MPV 9.1 06/19/2024 06:25 AM     CMP:    Lab Results   Component Value Date/Time     06/19/2024 06:25 AM    K 4.4 06/19/2024 06:25 AM     06/19/2024 06:25 AM    CO2 32 06/19/2024 06:25 AM    BUN 13 06/19/2024 06:25 AM    CREATININE 0.5 06/19/2024 06:25 AM    GFRAA

## 2024-06-19 NOTE — PROGRESS NOTES
[facility]  Internal Medicine Residency / House Medicine Service    Attending Physician Statement  I have discussed the case, including pertinent history and exam findings with the resident and the team.  I have seen and examined the patient and the key elements of the encounter have been performed by me.  I agree with the assessment, plan and orders as documented by the resident.      Pulmonary status improving on extra steroids  Lungs with diffuse Exp wheeze  A&O  VS stable   Viral studies negative  Appears not to bacterial pneumonia  Coming off antibiotics  Same high [place]  Follow Pulmonary recommendations    Remainder of medical problems as per resident note.      Leonidas Velez MD FRCP [facility]  Internal Medicine Residency Faculty

## 2024-06-20 LAB
ANION GAP SERPL CALCULATED.3IONS-SCNC: 11 MMOL/L (ref 7–16)
BASOPHILS # BLD: 0.02 K/UL (ref 0–0.2)
BASOPHILS NFR BLD: 0 % (ref 0–2)
BUN SERPL-MCNC: 14 MG/DL (ref 6–23)
CALCIUM SERPL-MCNC: 9.8 MG/DL (ref 8.6–10.2)
CHLORIDE SERPL-SCNC: 97 MMOL/L (ref 98–107)
CO2 SERPL-SCNC: 31 MMOL/L (ref 22–29)
CREAT SERPL-MCNC: 0.5 MG/DL (ref 0.5–1)
EOSINOPHIL # BLD: 0 K/UL (ref 0.05–0.5)
EOSINOPHILS RELATIVE PERCENT: 0 % (ref 0–6)
ERYTHROCYTE [DISTWIDTH] IN BLOOD BY AUTOMATED COUNT: 14.4 % (ref 11.5–15)
GFR, ESTIMATED: >90 ML/MIN/1.73M2
GLUCOSE BLD-MCNC: 103 MG/DL (ref 74–99)
GLUCOSE BLD-MCNC: 173 MG/DL (ref 74–99)
GLUCOSE BLD-MCNC: 178 MG/DL (ref 74–99)
GLUCOSE BLD-MCNC: 190 MG/DL (ref 74–99)
GLUCOSE SERPL-MCNC: 166 MG/DL (ref 74–99)
HCT VFR BLD AUTO: 43 % (ref 34–48)
HGB BLD-MCNC: 13.6 G/DL (ref 11.5–15.5)
HIV 1+2 AB+HIV1 P24 AG SERPL QL IA: NONREACTIVE
IMM GRANULOCYTES # BLD AUTO: 0.11 K/UL (ref 0–0.58)
IMM GRANULOCYTES NFR BLD: 1 % (ref 0–5)
LYMPHOCYTES NFR BLD: 2.31 K/UL (ref 1.5–4)
LYMPHOCYTES RELATIVE PERCENT: 23 % (ref 20–42)
MAGNESIUM SERPL-MCNC: 2.2 MG/DL (ref 1.6–2.6)
MCH RBC QN AUTO: 29.7 PG (ref 26–35)
MCHC RBC AUTO-ENTMCNC: 31.6 G/DL (ref 32–34.5)
MCV RBC AUTO: 93.9 FL (ref 80–99.9)
MONOCYTES NFR BLD: 0.51 K/UL (ref 0.1–0.95)
MONOCYTES NFR BLD: 5 % (ref 2–12)
NEUTROPHILS NFR BLD: 71 % (ref 43–80)
NEUTS SEG NFR BLD: 7.11 K/UL (ref 1.8–7.3)
PHOSPHATE SERPL-MCNC: 3.8 MG/DL (ref 2.5–4.5)
PLATELET # BLD AUTO: 295 K/UL (ref 130–450)
PMV BLD AUTO: 9 FL (ref 7–12)
POTASSIUM SERPL-SCNC: 4.3 MMOL/L (ref 3.5–5)
RBC # BLD AUTO: 4.58 M/UL (ref 3.5–5.5)
SODIUM SERPL-SCNC: 139 MMOL/L (ref 132–146)
T4 FREE SERPL-MCNC: 1.2 NG/DL (ref 0.9–1.7)
TSH SERPL DL<=0.05 MIU/L-ACNC: 0.4 UIU/ML (ref 0.27–4.2)
WBC OTHER # BLD: 10.1 K/UL (ref 4.5–11.5)

## 2024-06-20 PROCEDURE — 1200000000 HC SEMI PRIVATE

## 2024-06-20 PROCEDURE — 84439 ASSAY OF FREE THYROXINE: CPT

## 2024-06-20 PROCEDURE — 6370000000 HC RX 637 (ALT 250 FOR IP): Performed by: NURSE PRACTITIONER

## 2024-06-20 PROCEDURE — 6360000002 HC RX W HCPCS

## 2024-06-20 PROCEDURE — 2580000003 HC RX 258

## 2024-06-20 PROCEDURE — 99223 1ST HOSP IP/OBS HIGH 75: CPT | Performed by: INTERNAL MEDICINE

## 2024-06-20 PROCEDURE — 82962 GLUCOSE BLOOD TEST: CPT

## 2024-06-20 PROCEDURE — 6360000002 HC RX W HCPCS: Performed by: STUDENT IN AN ORGANIZED HEALTH CARE EDUCATION/TRAINING PROGRAM

## 2024-06-20 PROCEDURE — 2580000003 HC RX 258: Performed by: STUDENT IN AN ORGANIZED HEALTH CARE EDUCATION/TRAINING PROGRAM

## 2024-06-20 PROCEDURE — 2700000000 HC OXYGEN THERAPY PER DAY

## 2024-06-20 PROCEDURE — 86039 ANTINUCLEAR ANTIBODIES (ANA): CPT

## 2024-06-20 PROCEDURE — 36415 COLL VENOUS BLD VENIPUNCTURE: CPT

## 2024-06-20 PROCEDURE — 94669 MECHANICAL CHEST WALL OSCILL: CPT

## 2024-06-20 PROCEDURE — 86038 ANTINUCLEAR ANTIBODIES: CPT

## 2024-06-20 PROCEDURE — 85025 COMPLETE CBC W/AUTO DIFF WBC: CPT

## 2024-06-20 PROCEDURE — 84100 ASSAY OF PHOSPHORUS: CPT

## 2024-06-20 PROCEDURE — 99233 SBSQ HOSP IP/OBS HIGH 50: CPT | Performed by: INTERNAL MEDICINE

## 2024-06-20 PROCEDURE — APPSS180 APP SPLIT SHARED TIME > 60 MINUTES: Performed by: NURSE PRACTITIONER

## 2024-06-20 PROCEDURE — 84443 ASSAY THYROID STIM HORMONE: CPT

## 2024-06-20 PROCEDURE — 6370000000 HC RX 637 (ALT 250 FOR IP)

## 2024-06-20 PROCEDURE — 99231 SBSQ HOSP IP/OBS SF/LOW 25: CPT | Performed by: INTERNAL MEDICINE

## 2024-06-20 PROCEDURE — 80048 BASIC METABOLIC PNL TOTAL CA: CPT

## 2024-06-20 PROCEDURE — 94640 AIRWAY INHALATION TREATMENT: CPT

## 2024-06-20 PROCEDURE — 83735 ASSAY OF MAGNESIUM: CPT

## 2024-06-20 RX ORDER — REGADENOSON 0.08 MG/ML
0.4 INJECTION, SOLUTION INTRAVENOUS
Status: COMPLETED | OUTPATIENT
Start: 2024-06-20 | End: 2024-06-21

## 2024-06-20 RX ORDER — METOPROLOL SUCCINATE 25 MG/1
25 TABLET, EXTENDED RELEASE ORAL 2 TIMES DAILY WITH MEALS
Status: DISCONTINUED | OUTPATIENT
Start: 2024-06-20 | End: 2024-06-23 | Stop reason: HOSPADM

## 2024-06-20 RX ORDER — ATORVASTATIN CALCIUM 40 MG/1
40 TABLET, FILM COATED ORAL DAILY
Status: DISCONTINUED | OUTPATIENT
Start: 2024-06-21 | End: 2024-06-23 | Stop reason: HOSPADM

## 2024-06-20 RX ADMIN — AMLODIPINE BESYLATE 5 MG: 5 TABLET ORAL at 08:22

## 2024-06-20 RX ADMIN — ALBUTEROL SULFATE 2.5 MG: 2.5 SOLUTION RESPIRATORY (INHALATION) at 06:11

## 2024-06-20 RX ADMIN — WATER 40 MG: 1 INJECTION INTRAMUSCULAR; INTRAVENOUS; SUBCUTANEOUS at 10:46

## 2024-06-20 RX ADMIN — SODIUM CHLORIDE, PRESERVATIVE FREE 10 ML: 5 INJECTION INTRAVENOUS at 20:16

## 2024-06-20 RX ADMIN — BUDESONIDE INHALATION 500 MCG: 0.5 SUSPENSION RESPIRATORY (INHALATION) at 06:11

## 2024-06-20 RX ADMIN — BUDESONIDE INHALATION 500 MCG: 0.5 SUSPENSION RESPIRATORY (INHALATION) at 19:58

## 2024-06-20 RX ADMIN — ACETYLCYSTEINE 600 MG: 200 SOLUTION ORAL; RESPIRATORY (INHALATION) at 06:12

## 2024-06-20 RX ADMIN — WATER 40 MG: 1 INJECTION INTRAMUSCULAR; INTRAVENOUS; SUBCUTANEOUS at 20:16

## 2024-06-20 RX ADMIN — ATORVASTATIN CALCIUM 20 MG: 20 TABLET, FILM COATED ORAL at 08:22

## 2024-06-20 RX ADMIN — METOPROLOL SUCCINATE 25 MG: 25 TABLET, EXTENDED RELEASE ORAL at 16:51

## 2024-06-20 RX ADMIN — ALBUTEROL SULFATE 2.5 MG: 2.5 SOLUTION RESPIRATORY (INHALATION) at 11:53

## 2024-06-20 RX ADMIN — Medication 2000 UNITS: at 08:22

## 2024-06-20 RX ADMIN — SODIUM CHLORIDE, PRESERVATIVE FREE 10 ML: 5 INJECTION INTRAVENOUS at 08:23

## 2024-06-20 RX ADMIN — FLUTICASONE PROPIONATE 1 SPRAY: 50 SPRAY, METERED NASAL at 08:35

## 2024-06-20 RX ADMIN — ARFORMOTEROL TARTRATE 15 MCG: 15 SOLUTION RESPIRATORY (INHALATION) at 06:11

## 2024-06-20 RX ADMIN — ENOXAPARIN SODIUM 40 MG: 100 INJECTION SUBCUTANEOUS at 08:22

## 2024-06-20 RX ADMIN — ACETYLCYSTEINE 600 MG: 200 SOLUTION ORAL; RESPIRATORY (INHALATION) at 20:06

## 2024-06-20 RX ADMIN — CETIRIZINE HYDROCHLORIDE 10 MG: 10 TABLET, FILM COATED ORAL at 08:22

## 2024-06-20 RX ADMIN — ARFORMOTEROL TARTRATE 15 MCG: 15 SOLUTION RESPIRATORY (INHALATION) at 19:58

## 2024-06-20 RX ADMIN — ALBUTEROL SULFATE 2.5 MG: 2.5 SOLUTION RESPIRATORY (INHALATION) at 19:58

## 2024-06-20 ASSESSMENT — PAIN SCALES - GENERAL
PAINLEVEL_OUTOF10: 0

## 2024-06-20 NOTE — PROGRESS NOTES
[facility]  Internal Medicine Residency Program  Progress Note - House Team       Patient:  Estefani Chiu 68 y.o. female   MRN: [id number]       Date of Service: 6/20/2024    Subjective   Patient was seen and examined at bedside this morning, was sitting in bed, not in distress, on O2 @ 5L via NC.   No new complaints.  No significant or concerning events otherwise.    Objective       Physical Exam  Vitals: /72   Pulse 84   Temp 97.5 °F (36.4 °C)   Resp 16   Wt 79.4 kg (175 lb)   SpO2 93%   BMI 32.01 kg/m²     I & O - 24hr: No intake/output data recorded.   General Appearance: alert, appears stated age, and cooperative  HEENT:  Head: Normal, normocephalic, atraumatic.  Lung: B/L decreased air entry with diffuse mid to end expiratory wheeze  Heart: Tachycardia, s1s2  Abdomen: soft, non-tender; bowel sounds normal; no masses,  no organomegaly  Extremities:  extremities normal, atraumatic, no cyanosis or edema  Neurologic: Mental status: Alert, oriented, thought content appropriate  Diffusely scattered non painful and non discharging hyperpigmented maculopapular lesion of 2-4 mm in diameter over the upper back on the background of diffuse hyperkeratotic and hyperpigmented skin.    Diet:   ADULT DIET; Regular      Pertinent Labs & Imaging Studies     Labs    CBC with Differential:    Lab Results   Component Value Date/Time    WBC 10.1 06/20/2024 05:53 AM    RBC 4.58 06/20/2024 05:53 AM    HGB 13.6 06/20/2024 05:53 AM    HCT 43.0 06/20/2024 05:53 AM     06/20/2024 05:53 AM    MCV 93.9 06/20/2024 05:53 AM    MCH 29.7 06/20/2024 05:53 AM    MCHC 31.6 06/20/2024 05:53 AM    RDW 14.4 06/20/2024 05:53 AM    LYMPHOPCT 23 06/20/2024 05:53 AM    MONOPCT 5 06/20/2024 05:53 AM    EOSPCT 0 06/20/2024 05:53 AM    BASOPCT 0 06/20/2024 05:53 AM    MONOSABS 0.51 06/20/2024 05:53 AM    LYMPHSABS 3.15 01/07/2014 11:17 AM    EOSABS 0.00 06/20/2024 05:53 AM    BASOSABS 0.02 06/20/2024 05:53 AM

## 2024-06-20 NOTE — PROGRESS NOTES
4 Eyes Skin Assessment     NAME:  Estefani Chiu  YOB: 1955  MEDICAL RECORD NUMBER:  [id number]    The patient is being assessed for  Transfer to New Unit    I agree that at least one RN has performed a thorough Head to Toe Skin Assessment on the patient. ALL assessment sites listed below have been assessed.      Areas assessed by both nurses:    Head, Face, Ears, Shoulders, Back, Chest, Arms, Elbows, Hands, Sacrum. Buttock, Coccyx, Ischium, and Legs. Feet and Heels        Does the Patient have a Wound? No noted wound(s)    Dry/flaky/scaly/bumpy skin to back  Cracking/dry bilat heels       Dustin Prevention initiated by RN: No  Wound Care Orders initiated by RN: No    Pressure Injury (Stage 3,4, Unstageable, DTI, NWPT, and Complex wounds) if present, place Wound referral order by RN under : No    New Ostomies, if present place, Ostomy referral order under : No     Nurse 1 eSignature: Electronically signed by Mabel Hou RN on 6/20/24 at 5:51 PM EDT    **SHARE this note so that the co-signing nurse can place an eSignature**    Nurse 2 eSignature: Electronically signed by Kiarra Yoo RN on 6/20/24 at 6:59 PM EDT

## 2024-06-20 NOTE — PROGRESS NOTES
Messaged IM 1 CARA to request downgrade Dr Wood taking messages at this time     Electronically signed by Linnea Long RN on 6/20/2024 at 9:57 AM

## 2024-06-20 NOTE — PROGRESS NOTES
[facility]  Internal Medicine Residency / House Medicine Service    Attending Physician Statement  I have discussed the case, including pertinent history and exam findings with the resident and the team.  I have seen and examined the patient and the key elements of the encounter have been performed by me.  I agree with the assessment, plan and orders as documented by the resident.      Wheezing continues bilaterally  Steroids continues  Infections ruled out  Patient A&O  Icthyosis on back same for years   Mobile and continent   On 5 liters NC at 5 liters  Pulmonary following and continuing workup  Planning bronchoscopy when stable      Remainder of medical problems as per resident note.      Leonidas Velez MD FRCP [facility]  Internal Medicine Residency Faculty

## 2024-06-20 NOTE — PLAN OF CARE
Problem: Discharge Planning  Goal: Discharge to home or other facility with appropriate resources  6/20/2024 1631 by Marielena Hou RN  Outcome: Progressing  6/20/2024 1144 by Adrián Tam RN  Outcome: Progressing     Problem: Safety - Adult  Goal: Free from fall injury  6/20/2024 1631 by Marielena Hou RN  Outcome: Progressing  6/20/2024 1144 by Adrián Tam RN  Outcome: Progressing  Flowsheets (Taken 6/20/2024 1143)  Free From Fall Injury: Instruct family/caregiver on patient safety     Problem: Chronic Conditions and Co-morbidities  Goal: Patient's chronic conditions and co-morbidity symptoms are monitored and maintained or improved  6/20/2024 1631 by Marielena Hou RN  Outcome: Progressing  6/20/2024 1144 by Adrián Tam RN  Outcome: Progressing     Problem: Skin/Tissue Integrity  Goal: Absence of new skin breakdown  Description: 1.  Monitor for areas of redness and/or skin breakdown  2.  Assess vascular access sites hourly  3.  Every 4-6 hours minimum:  Change oxygen saturation probe site  4.  Every 4-6 hours:  If on nasal continuous positive airway pressure, respiratory therapy assess nares and determine need for appliance change or resting period.  6/20/2024 1631 by Marielena Hou RN  Outcome: Progressing  6/20/2024 1144 by Adrián Tam RN  Outcome: Progressing     Problem: Pain  Goal: Verbalizes/displays adequate comfort level or baseline comfort level  6/20/2024 1631 by Marielena Hou RN  Outcome: Progressing  6/20/2024 1144 by Adrián Tam RN  Outcome: Progressing

## 2024-06-20 NOTE — PROGRESS NOTES
[facility]  Internal Medicine Residency Program  Progress Note - House Team       Patient:  Estefani Chiu 68 y.o. female   MRN: [id number]       Date of Service: 6/20/2024    Subjective   Patient was seen and examined at bedside this morning, was sitting in bed, not in distress, on O2 @ 5L via NC.   No new complaints.  No significant or concerning events otherwise.    Objective       Physical Exam  Vitals: /72   Pulse 84   Temp 97.5 °F (36.4 °C)   Resp 16   Wt 79.4 kg (175 lb)   SpO2 93%   BMI 32.01 kg/m²     I & O - 24hr: No intake/output data recorded.   General Appearance: alert, appears stated age, and cooperative  HEENT:  Head: Normal, normocephalic, atraumatic.  Lung: B/L decreased air entry with diffuse mid to end expiratory wheeze  Heart: Tachycardia, s1s2  Abdomen: soft, non-tender; bowel sounds normal; no masses,  no organomegaly  Extremities:  extremities normal, atraumatic, no cyanosis or edema  Neurologic: Mental status: Alert, oriented, thought content appropriate  Diffusely scattered non painful and non discharging hyperpigmented maculopapular lesion of 2-4 mm in diameter over the upper back on the background of diffuse hyperkeratotic and hyperpigmented skin.    Diet:   ADULT DIET; Regular      Pertinent Labs & Imaging Studies     Labs    CBC with Differential:    Lab Results   Component Value Date/Time    WBC 10.1 06/20/2024 05:53 AM    RBC 4.58 06/20/2024 05:53 AM    HGB 13.6 06/20/2024 05:53 AM    HCT 43.0 06/20/2024 05:53 AM     06/20/2024 05:53 AM    MCV 93.9 06/20/2024 05:53 AM    MCH 29.7 06/20/2024 05:53 AM    MCHC 31.6 06/20/2024 05:53 AM    RDW 14.4 06/20/2024 05:53 AM    LYMPHOPCT 23 06/20/2024 05:53 AM    MONOPCT 5 06/20/2024 05:53 AM    EOSPCT 0 06/20/2024 05:53 AM    BASOPCT 0 06/20/2024 05:53 AM    MONOSABS 0.51 06/20/2024 05:53 AM    LYMPHSABS 3.15 01/07/2014 11:17 AM    EOSABS 0.00 06/20/2024 05:53 AM    BASOSABS 0.02 06/20/2024 05:53 AM      Hemoglobin/Hematocrit:    Lab Results   Component Value Date/Time    HGB 13.6 06/20/2024 05:53 AM    HCT 43.0 06/20/2024 05:53 AM     CMP:    Lab Results   Component Value Date/Time     06/20/2024 05:53 AM    K 4.3 06/20/2024 05:53 AM    CL 97 06/20/2024 05:53 AM    CO2 31 06/20/2024 05:53 AM    BUN 14 06/20/2024 05:53 AM    CREATININE 0.5 06/20/2024 05:53 AM    GFRAA >60 03/24/2014 03:27 PM    LABGLOM >90 06/20/2024 05:53 AM    LABGLOM >90 04/05/2024 07:28 PM    GLUCOSE 166 06/20/2024 05:53 AM    CALCIUM 9.8 06/20/2024 05:53 AM    BILITOT 0.6 06/17/2024 09:15 AM    ALKPHOS 109 06/17/2024 09:15 AM    AST 18 06/17/2024 09:15 AM    ALT 10 06/17/2024 09:15 AM     Sodium:    Lab Results   Component Value Date/Time     06/20/2024 05:53 AM     Potassium:    Lab Results   Component Value Date/Time    K 4.3 06/20/2024 05:53 AM       Imaging Studies:    CTA PULMONARY W CONTRAST   Final Result   1.  Limited study by artifacts obscuring detail.  The cannot see   conspicuously acute central pulmonary embolus in the right or in the left   lung pulmonary artery circulations in the central pulmonary arteries of the   mediastinum.      2.  Continued pattern of discrete vague ill-defined ground-glass opacities   throughout the lung form a mosaic pattern.  Can represent form of a chronic   of L itis as observed previously.  Please correlate clinically.      3.  Stable mild mediastinal adenopathy.         XR CHEST PORTABLE   Final Result   1. Patchy bilateral perihilar and lower lobe airspace disease.             Resident's Assessment and Plan     Acute hypoxic respiratory insufficiency likely 2/2 chronic bronchitis vs  asthma vs sarcoidosis ??eosinophilic pneumonitis, ?Viral pna  CT chest:-Discrete vague ill-defined groundglass opacities throughout the lung, mosaic pattern.    Primary hypertension, well controlled  Leukocytosis, likely secondary to steroid use  Type 2 diabetes mellitus    Cavernous hemangioma on the  right hepatic lobe, 5mm  Flash fill hemangioma on the right side, 7mm  Hypertension   Mild CAD on imaging  Class 1 obesity  H/O tobacco abuse Smoker    Hilar and Mediastinal Adenopathy  on past imaging  Elevated IgE  Past H/O stroke   Vit D deficiency    Monitor respiratory status, wean oxygen to baseline if tolerated  On IV steroids, to continue; might require dosing treatment (methylprednisone 40mg q12h)  Patient currently on cefepime and vancomycin, does not have any clinical or bacteriological signs of infection, off antibiotic today  EF 65-70%, LVOT obs at rest  No Aortic stenosis  Continue breathing treatments, Brovana Pulmicort and DuoNeb  On low-dose sliding scale  Pulmonology consulted, appreciate recommendation  -Follow MPO and PR3 antibody, HIV screening and aspergillus antibody  -Plan for EBUS and biopsy      DVT prophylaxis: Lovenox  Diet:   No diet orders on file   Bowel regimen: PRN  Pain management: as needed  Code status: No Order   Disposition: Admission to [facility]  Family: updated as available    Jaylene Wood MD, PGY-1   Attending physician: Dr. Velez

## 2024-06-20 NOTE — CARE COORDINATION
6/20/24 Update CM Note. Pt admitted 6/17/24 Dx PN. Remains on O2 5L/NC, 4L is baseline. IV Solumedrol, nebulizers. Echo completed.  Planning [facility] as OP. Discharge plans to return home with daughters. Family will provide transportation.   Elif VENTURAN RN-BC  [phone]

## 2024-06-20 NOTE — PROGRESS NOTES
Pulmonary/Critical Care Medicine  Resident Progress Note    SUBJECTIVE: Patient reports wheezing has improved but still struggling with shortness of breath and difficulty to cough up thick secretions. PEP/flutter was ordered yesterday but is not in the room. Patient is familiar with the device but states she has not been given one yet.       PHYSICAL EXAM:    Vitals:    /72   Pulse 84   Temp 97.5 °F (36.4 °C)   Resp 16   Wt 79.4 kg (175 lb)   SpO2 93%   BMI 32.01 kg/m²     General appearance: alert and cooperative  Head: Normocephalic, without obvious abnormality  Neck: no JVD  Lungs: wheezes anterior - bilateral  Heart: regular rate and rhythm, S1, S2 normal, no murmur, click, rub or gallop  Abdomen: soft, non-tender; bowel sounds normal; no masses,  no organomegaly  Extremities: extremities normal, atraumatic, no cyanosis or edema      Objective:     Patient Vitals for the past 8 hrs:   BP Temp Pulse Resp SpO2   06/20/24 0815 132/72 97.5 °F (36.4 °C) 84 16 93 %   06/20/24 0611 -- -- -- -- 99 %   06/20/24 0400 128/62 97 °F (36.1 °C) 71 -- 99 %   06/20/24 0339 -- -- -- -- 99 %         ECG: sinus tachycardia  CXR: Pathcy BL perihilar and lower lobe airspace disease    ECHO:    Left Ventricle: Hyperdynamic left ventricular systolic function with a visually estimated EF of 65 - 70%. Findings consistent with mild asymmetric hypertrophy. Elevated left ventricular filling pressure. LVOT   obstruction at rest. LVOT peak gradient at rest is 12 mmHg.     Right Ventricle: Right ventricle size is normal. Normal systolic   function.    Aortic Valve: No stenosis. AV area by continuity VTI is 2.6 cm2. AV   area by peak velocity is 2.7 cm2.     Mitral Valve: MV mean gradient is 2 mmHg. Mild regurgitation. No   stenosis noted. MV mean gradient is 2 mmHg. MV area by continuity equation is 3.8 cm2.     Tricuspid Valve: Mild regurgitation.     Pulmonic Valve: Trace regurgitation.     Image quality is fair.       Data

## 2024-06-20 NOTE — PLAN OF CARE
Problem: Discharge Planning  Goal: Discharge to home or other facility with appropriate resources  Outcome: Progressing     Problem: Safety - Adult  Goal: Free from fall injury  Outcome: Progressing  Flowsheets (Taken 6/20/2024 1143)  Free From Fall Injury: Instruct family/caregiver on patient safety     Problem: Chronic Conditions and Co-morbidities  Goal: Patient's chronic conditions and co-morbidity symptoms are monitored and maintained or improved  Outcome: Progressing     Problem: Skin/Tissue Integrity  Goal: Absence of new skin breakdown  Description: 1.  Monitor for areas of redness and/or skin breakdown  2.  Assess vascular access sites hourly  3.  Every 4-6 hours minimum:  Change oxygen saturation probe site  4.  Every 4-6 hours:  If on nasal continuous positive airway pressure, respiratory therapy assess nares and determine need for appliance change or resting period.  Outcome: Progressing     Problem: Pain  Goal: Verbalizes/displays adequate comfort level or baseline comfort level  Outcome: Progressing

## 2024-06-20 NOTE — CONSULTS
Inpatient Cardiology Consultation      Reason for Consult:  Increased SOB (at 3 L o2 baseline), Last Echo: LVOT obstruction at rest. LVOT peak gradient at rest is 12 mmHg.     Consulting Physician: Dr. Aguila    Requesting Physician:  Dr. Wood     Date of Consultation: 6/20/2024    HISTORY OF PRESENT ILLNESS: 68 y.o. AA female, not known to any cardiology.    Presented to ED for evaluation of 4 day history of SOB, progressively worsening, and 2 day history of chest pain, as per ED note.     Patient reports SOB and chest pain/tightness, since October of 2023, that has progressively worsened, and has be at it's worst over the last 7 days, 7/10 on pain scale, with associated dizziness, exacerbated with activity, especially with walking, but does get some relief with rest. Denies any other associated symptoms, including abdm pain, jaw/neck pain, fever chills, nausea, or vomiting.    Patient reports she is on chronic O2, 4L, since October 2023. She notes PND, almost nightly, since June 1st.     She notes she is eating and drinking, without any early satiety, bloating, or abdm discomfort.     Lives with her 2 daughters, in a 2 story house with a basement, she stays on main floor. Notes she has to use rails to pull herself op and keep her balance. Sleeps on the cough, for comfort reasons, with 2 pillows, and denies any history of ADRIEN, but has never been tested. Denies orthopnea.      [facility] 6/17/2024: /97, , afebrile, O2 sat 85% on 4L     Toponin 11>13>11, P-BNP 79, K+ 4.7>4.2>4.4>4.3, BUN/CR 10/0.5>9/0.5>13/0.5>14/0.5, AST 18, WBC 7.4>9.3>12.7>8.9>10.1, H/H 14.6/46.2>13.9/43.9>14.3/44.4>13.9/43.4>13.6/43.0, >313>297>280>295, Mag 1.9>2.1>2.1>2.2, Phos 4.5>4.1>3.8     XR  6/17/2024: Patchy bilateral perihilar and lower lobe airspace disease.     CTA CHEST W CONTRAST 6/17/2024:  Limited study by artifacts obscuring detail.  The cannot see conspicuously acute central pulmonary embolus in the  7.1  12/27/2023 A1C 7.0  3/06/2024 A1C 6.9  HLD - not on statin  Bursitis  Arthritis  Carpal Tunnel  Pinched nerve in elbow  Sinusitis   CVA 2009 - reports no residuals  Former     Plan:   Given patient LVOT gradient it is crucial to maintain slow heart rate while avoiding hypotension and subsequently we will discontinue on amlodipine and hydrochlorothiazide  We will initiate metoprolol succinate 25 mg p.o. twice a day for goal blood pressure control and to maintain lower and heart rate to avoid LVOT obstruction  Due to reported chest pain and risk factors, we will arrange for myocardial perfusion stress test tomorrow  Exercise stress echo is not arranged as patient not show you could walk on a treadmill.   Once ready to be discharged she will benefit from 14-day Zio patch heart monitor to rule out arrhythmias  She will eventually benefit from cardiac MRI in the outpatient  Uptitrate beta-blocker for optimal blood pressure control and to maintain lower and heart rate  Given degree of total cholesterol and LDL level we will increase Lipitor to 40 mg daily  Monitor closely on telemetry  Monitor electrolyte and keep potassium at 4 magnesium at 2  Obtain orthostatic vital signs  Avoid dehydration  Patient is to be advised to avoid skipping meals and to maintain optimal hydration in order to improve symptoms that may be caused by LVOT obstruction.      Bradley Aguila MD  [facility] Cardiology

## 2024-06-21 ENCOUNTER — APPOINTMENT (OUTPATIENT)
Age: 69
DRG: 189 | End: 2024-06-21
Attending: INTERNAL MEDICINE
Payer: MEDICARE

## 2024-06-21 LAB
ANA SER QL IA: NEGATIVE
ANION GAP SERPL CALCULATED.3IONS-SCNC: 12 MMOL/L (ref 7–16)
ASPERGILLUS GALACTO AG: NEGATIVE
BASOPHILS # BLD: 0.02 K/UL (ref 0–0.2)
BASOPHILS NFR BLD: 0 % (ref 0–2)
BUN SERPL-MCNC: 13 MG/DL (ref 6–23)
CALCIUM SERPL-MCNC: 9.5 MG/DL (ref 8.6–10.2)
CHLORIDE SERPL-SCNC: 99 MMOL/L (ref 98–107)
CO2 SERPL-SCNC: 28 MMOL/L (ref 22–29)
CREAT SERPL-MCNC: 0.5 MG/DL (ref 0.5–1)
ECHO BSA: 1.86 M2
EOSINOPHIL # BLD: 0.02 K/UL (ref 0.05–0.5)
EOSINOPHILS RELATIVE PERCENT: 0 % (ref 0–6)
ERYTHROCYTE [DISTWIDTH] IN BLOOD BY AUTOMATED COUNT: 14.4 % (ref 11.5–15)
GALACTOMANNAN AG SPEC IA-ACNC: 0.09
GFR, ESTIMATED: >90 ML/MIN/1.73M2
GLUCOSE BLD-MCNC: 145 MG/DL (ref 74–99)
GLUCOSE BLD-MCNC: 170 MG/DL (ref 74–99)
GLUCOSE BLD-MCNC: 213 MG/DL (ref 74–99)
GLUCOSE BLD-MCNC: 228 MG/DL (ref 74–99)
GLUCOSE SERPL-MCNC: 177 MG/DL (ref 74–99)
HCT VFR BLD AUTO: 42.6 % (ref 34–48)
HGB BLD-MCNC: 13.8 G/DL (ref 11.5–15.5)
IMM GRANULOCYTES # BLD AUTO: 0.14 K/UL (ref 0–0.58)
IMM GRANULOCYTES NFR BLD: 1 % (ref 0–5)
LYMPHOCYTES NFR BLD: 2.78 K/UL (ref 1.5–4)
LYMPHOCYTES RELATIVE PERCENT: 24 % (ref 20–42)
MAGNESIUM SERPL-MCNC: 2.3 MG/DL (ref 1.6–2.6)
MCH RBC QN AUTO: 30.4 PG (ref 26–35)
MCHC RBC AUTO-ENTMCNC: 32.4 G/DL (ref 32–34.5)
MCV RBC AUTO: 93.8 FL (ref 80–99.9)
MONOCYTES NFR BLD: 0.54 K/UL (ref 0.1–0.95)
MONOCYTES NFR BLD: 5 % (ref 2–12)
NEUTROPHILS NFR BLD: 69 % (ref 43–80)
NEUTS SEG NFR BLD: 7.96 K/UL (ref 1.8–7.3)
NUC STRESS EJECTION FRACTION: 83 %
PHOSPHATE SERPL-MCNC: 3.6 MG/DL (ref 2.5–4.5)
PLATELET # BLD AUTO: 284 K/UL (ref 130–450)
PMV BLD AUTO: 9.2 FL (ref 7–12)
POTASSIUM SERPL-SCNC: 4.7 MMOL/L (ref 3.5–5)
RBC # BLD AUTO: 4.54 M/UL (ref 3.5–5.5)
SODIUM SERPL-SCNC: 139 MMOL/L (ref 132–146)
STRESS BASELINE DIAS BP: 82 MMHG
STRESS BASELINE HR: 67 BPM
STRESS BASELINE SYS BP: 158 MMHG
STRESS ESTIMATED WORKLOAD: 1 METS
STRESS PEAK DIAS BP: 82 MMHG
STRESS PEAK SYS BP: 158 MMHG
STRESS PERCENT HR ACHIEVED: 67 %
STRESS POST PEAK HR: 102 BPM
STRESS RATE PRESSURE PRODUCT: NORMAL BPM*MMHG
STRESS TARGET HR: 152 BPM
WBC OTHER # BLD: 11.5 K/UL (ref 4.5–11.5)

## 2024-06-21 PROCEDURE — 93017 CV STRESS TEST TRACING ONLY: CPT

## 2024-06-21 PROCEDURE — 1200000000 HC SEMI PRIVATE

## 2024-06-21 PROCEDURE — 78452 HT MUSCLE IMAGE SPECT MULT: CPT

## 2024-06-21 PROCEDURE — 84100 ASSAY OF PHOSPHORUS: CPT

## 2024-06-21 PROCEDURE — A9500 TC99M SESTAMIBI: HCPCS | Performed by: RADIOLOGY

## 2024-06-21 PROCEDURE — 83735 ASSAY OF MAGNESIUM: CPT

## 2024-06-21 PROCEDURE — 78452 HT MUSCLE IMAGE SPECT MULT: CPT | Performed by: INTERNAL MEDICINE

## 2024-06-21 PROCEDURE — 6360000002 HC RX W HCPCS: Performed by: INTERNAL MEDICINE

## 2024-06-21 PROCEDURE — 99233 SBSQ HOSP IP/OBS HIGH 50: CPT | Performed by: INTERNAL MEDICINE

## 2024-06-21 PROCEDURE — 2700000000 HC OXYGEN THERAPY PER DAY

## 2024-06-21 PROCEDURE — 93018 CV STRESS TEST I&R ONLY: CPT | Performed by: INTERNAL MEDICINE

## 2024-06-21 PROCEDURE — 6370000000 HC RX 637 (ALT 250 FOR IP)

## 2024-06-21 PROCEDURE — 80048 BASIC METABOLIC PNL TOTAL CA: CPT

## 2024-06-21 PROCEDURE — 2580000003 HC RX 258: Performed by: STUDENT IN AN ORGANIZED HEALTH CARE EDUCATION/TRAINING PROGRAM

## 2024-06-21 PROCEDURE — 6370000000 HC RX 637 (ALT 250 FOR IP): Performed by: NURSE PRACTITIONER

## 2024-06-21 PROCEDURE — 82962 GLUCOSE BLOOD TEST: CPT

## 2024-06-21 PROCEDURE — 93016 CV STRESS TEST SUPVJ ONLY: CPT | Performed by: INTERNAL MEDICINE

## 2024-06-21 PROCEDURE — 6360000002 HC RX W HCPCS

## 2024-06-21 PROCEDURE — 36415 COLL VENOUS BLD VENIPUNCTURE: CPT

## 2024-06-21 PROCEDURE — 3430000000 HC RX DIAGNOSTIC RADIOPHARMACEUTICAL: Performed by: RADIOLOGY

## 2024-06-21 PROCEDURE — 94669 MECHANICAL CHEST WALL OSCILL: CPT

## 2024-06-21 PROCEDURE — 2580000003 HC RX 258

## 2024-06-21 PROCEDURE — 94640 AIRWAY INHALATION TREATMENT: CPT

## 2024-06-21 PROCEDURE — 6360000002 HC RX W HCPCS: Performed by: STUDENT IN AN ORGANIZED HEALTH CARE EDUCATION/TRAINING PROGRAM

## 2024-06-21 PROCEDURE — 85025 COMPLETE CBC W/AUTO DIFF WBC: CPT

## 2024-06-21 RX ORDER — TETRAKIS(2-METHOXYISOBUTYLISOCYANIDE)COPPER(I) TETRAFLUOROBORATE 1 MG/ML
35 INJECTION, POWDER, LYOPHILIZED, FOR SOLUTION INTRAVENOUS
Status: COMPLETED | OUTPATIENT
Start: 2024-06-21 | End: 2024-06-21

## 2024-06-21 RX ORDER — TETRAKIS(2-METHOXYISOBUTYLISOCYANIDE)COPPER(I) TETRAFLUOROBORATE 1 MG/ML
11 INJECTION, POWDER, LYOPHILIZED, FOR SOLUTION INTRAVENOUS
Status: COMPLETED | OUTPATIENT
Start: 2024-06-21 | End: 2024-06-21

## 2024-06-21 RX ADMIN — WATER 40 MG: 1 INJECTION INTRAMUSCULAR; INTRAVENOUS; SUBCUTANEOUS at 20:47

## 2024-06-21 RX ADMIN — CETIRIZINE HYDROCHLORIDE 10 MG: 10 TABLET, FILM COATED ORAL at 08:44

## 2024-06-21 RX ADMIN — ALBUTEROL SULFATE 2.5 MG: 2.5 SOLUTION RESPIRATORY (INHALATION) at 00:08

## 2024-06-21 RX ADMIN — ACETYLCYSTEINE 600 MG: 200 SOLUTION ORAL; RESPIRATORY (INHALATION) at 19:25

## 2024-06-21 RX ADMIN — ALBUTEROL SULFATE 2.5 MG: 2.5 SOLUTION RESPIRATORY (INHALATION) at 15:10

## 2024-06-21 RX ADMIN — Medication 2000 UNITS: at 08:44

## 2024-06-21 RX ADMIN — BUDESONIDE INHALATION 500 MCG: 0.5 SUSPENSION RESPIRATORY (INHALATION) at 19:25

## 2024-06-21 RX ADMIN — FLUTICASONE PROPIONATE 1 SPRAY: 50 SPRAY, METERED NASAL at 08:45

## 2024-06-21 RX ADMIN — REGADENOSON 0.4 MG: 0.08 INJECTION, SOLUTION INTRAVENOUS at 11:52

## 2024-06-21 RX ADMIN — ALBUTEROL SULFATE 2.5 MG: 2.5 SOLUTION RESPIRATORY (INHALATION) at 07:40

## 2024-06-21 RX ADMIN — ENOXAPARIN SODIUM 40 MG: 100 INJECTION SUBCUTANEOUS at 08:44

## 2024-06-21 RX ADMIN — BUDESONIDE INHALATION 500 MCG: 0.5 SUSPENSION RESPIRATORY (INHALATION) at 07:40

## 2024-06-21 RX ADMIN — SODIUM CHLORIDE, PRESERVATIVE FREE 10 ML: 5 INJECTION INTRAVENOUS at 20:48

## 2024-06-21 RX ADMIN — INSULIN LISPRO 1 UNITS: 100 INJECTION, SOLUTION INTRAVENOUS; SUBCUTANEOUS at 17:16

## 2024-06-21 RX ADMIN — METOPROLOL SUCCINATE 25 MG: 25 TABLET, EXTENDED RELEASE ORAL at 17:17

## 2024-06-21 RX ADMIN — ARFORMOTEROL TARTRATE 15 MCG: 15 SOLUTION RESPIRATORY (INHALATION) at 07:40

## 2024-06-21 RX ADMIN — ARFORMOTEROL TARTRATE 15 MCG: 15 SOLUTION RESPIRATORY (INHALATION) at 19:25

## 2024-06-21 RX ADMIN — ALBUTEROL SULFATE 2.5 MG: 2.5 SOLUTION RESPIRATORY (INHALATION) at 19:25

## 2024-06-21 RX ADMIN — Medication 34 MILLICURIE: at 11:58

## 2024-06-21 RX ADMIN — METOPROLOL SUCCINATE 25 MG: 25 TABLET, EXTENDED RELEASE ORAL at 08:44

## 2024-06-21 RX ADMIN — ATORVASTATIN CALCIUM 40 MG: 40 TABLET, FILM COATED ORAL at 08:43

## 2024-06-21 RX ADMIN — SODIUM CHLORIDE, PRESERVATIVE FREE 10 ML: 5 INJECTION INTRAVENOUS at 08:45

## 2024-06-21 RX ADMIN — WATER 40 MG: 1 INJECTION INTRAMUSCULAR; INTRAVENOUS; SUBCUTANEOUS at 08:44

## 2024-06-21 RX ADMIN — ACETYLCYSTEINE 600 MG: 200 SOLUTION ORAL; RESPIRATORY (INHALATION) at 07:40

## 2024-06-21 RX ADMIN — Medication 11.7 MILLICURIE: at 09:40

## 2024-06-21 ASSESSMENT — PAIN SCALES - GENERAL: PAINLEVEL_OUTOF10: 0

## 2024-06-21 NOTE — PROGRESS NOTES
[facility]  Internal Medicine Residency Program  Progress Note - House Team       Patient:  Estefani Chiu 68 y.o. female   MRN: [id number]       Date of Service: 6/21/2024    Subjective     Patient was seen and examined at bedside this morning, was sitting in bed, not in distress, on O2 @ 4L, symptomatically getting better.  No new complaints.  No significant or concerning events otherwise.    Objective       Physical Exam  Vitals: /70   Pulse 68   Temp 97 °F (36.1 °C) (Temporal)   Resp 22   Wt 79.4 kg (175 lb)   SpO2 94%   BMI 32.01 kg/m²     I & O - 24hr: No intake/output data recorded.   General Appearance: alert, appears stated age, and cooperative  HEENT:  Head: Normal, normocephalic, atraumatic.  Lung: b/l equal air entry, minimal wheeze over b/l mid-lower lung field, improved from previous examination  Heart: s1s2  Abdomen: soft, non-tender; bowel sounds normal; no masses,  no organomegaly  Extremities:  extremities normal, atraumatic, no cyanosis or edema  Neurologic: Mental status: Alert, oriented, thought content appropriate  Diffusely scattered non painful and non discharging hyperpigmented maculopapular lesion of 2-4 mm in diameter over the upper back on the background of diffuse hyperkeratotic and hyperpigmented skin.    Diet:   Diet NPO      Pertinent Labs & Imaging Studies     Labs    CBC with Differential:    Lab Results   Component Value Date/Time    WBC 11.5 06/21/2024 04:22 AM    RBC 4.54 06/21/2024 04:22 AM    HGB 13.8 06/21/2024 04:22 AM    HCT 42.6 06/21/2024 04:22 AM     06/21/2024 04:22 AM    MCV 93.8 06/21/2024 04:22 AM    MCH 30.4 06/21/2024 04:22 AM    MCHC 32.4 06/21/2024 04:22 AM    RDW 14.4 06/21/2024 04:22 AM    LYMPHOPCT 24 06/21/2024 04:22 AM    MONOPCT 5 06/21/2024 04:22 AM    EOSPCT 0 06/21/2024 04:22 AM    BASOPCT 0 06/21/2024 04:22 AM    MONOSABS 0.54 06/21/2024 04:22 AM    LYMPHSABS 3.15 01/07/2014 11:17 AM    EOSABS 0.02 06/21/2024

## 2024-06-21 NOTE — CARE COORDINATION
Plan is home with daughters when medically ready. Myocardial perfusion stress test today. Await results and input & plan from Cardiology. 14 day zio at discharge. Follow orthostatic BP. Patent's family will transport her home at time of discharge.   Estefani Louise RN   [phone]

## 2024-06-21 NOTE — PROGRESS NOTES
Inpatient Cardiology Consultation      Reason for Consult:  Increased SOB (at 3 L o2 baseline), Last Echo: LVOT obstruction at rest. LVOT peak gradient at rest is 12 mmHg.     Consulting Physician: Dr. Aguila    Requesting Physician:  Dr. Wood     Date of Consultation: 6/21/2024    HISTORY OF PRESENT ILLNESS: 68 y.o. AA female, not known to any cardiology.    Presented to ED for evaluation of 4 day history of SOB, progressively worsening, and 2 day history of chest pain, as per ED note.     Patient reports SOB and chest pain/tightness, since October of 2023, that has progressively worsened, and has be at it's worst over the last 7 days, 7/10 on pain scale, with associated dizziness, exacerbated with activity, especially with walking, but does get some relief with rest. Denies any other associated symptoms, including abdm pain, jaw/neck pain, fever chills, nausea, or vomiting.    Patient reports she is on chronic O2, 4L, since October 2023. She notes PND, almost nightly, since June 1st.     She notes she is eating and drinking, without any early satiety, bloating, or abdm discomfort.     Lives with her 2 daughters, in a 2 story house with a basement, she stays on main floor. Notes she has to use rails to pull herself op and keep her balance. Sleeps on the cough, for comfort reasons, with 2 pillows, and denies any history of ADRIEN, but has never been tested. Denies orthopnea.      [facility]-ED 6/17/2024: /97, , afebrile, O2 sat 85% on 4L     Toponin 11>13>11, P-BNP 79, K+ 4.7>4.2>4.4>4.3, BUN/CR 10/0.5>9/0.5>13/0.5>14/0.5, AST 18, WBC 7.4>9.3>12.7>8.9>10.1, H/H 14.6/46.2>13.9/43.9>14.3/44.4>13.9/43.4>13.6/43.0, >313>297>280>295, Mag 1.9>2.1>2.1>2.2, Phos 4.5>4.1>3.8     XR  6/17/2024: Patchy bilateral perihilar and lower lobe airspace disease.     CTA CHEST W CONTRAST 6/17/2024:  Limited study by artifacts obscuring detail.  The cannot see conspicuously acute central pulmonary embolus in the  events.    Stress Function: Left ventricular function post-stress is normal. Post-stress ejection fraction is 83%. The stress end diastolic cavity size is normal. The stress end systolic cavity size is normal.    Perfusion Comments: LV perfusion is normal.    Perfusion Conclusion: There is no evidence of transient ischemic dilation (TID).    ECG: Resting ECG demonstrates normal sinus rhythm.    ECG: The stress ECG was negative for ischemia.    Stress Test: A pharmacological stress test was performed using regadenoson (Lexiscan). PO caffeine given as a reversal agent.    Assessment    Hyperdynamic left ventricle  Borderline troponin leak  HLP  LVOT obstruction  FAY  Dizziness/LH  SOB  HTN  Chest pain  SOB  Acute Hypoxic respiratory failure  Chronic respiratory failure with 4 L O2 nasal,  T2 DM - Metformin  1/07/2014 A1C 7.1  12/27/2023 A1C 7.0  3/06/2024 A1C 6.9  HLD - not on statin  Bursitis  Arthritis  Carpal Tunnel  Pinched nerve in elbow  Sinusitis   CVA 2009 - reports no residuals  Former     Plan:   Report improvement with current medication adjustment with initiation of beta-blocker  If blood pressure remains uncontrolled change from metoprolol succinate to carvedilol  Amlodipine was switched to metoprolol succinate due to LVOT gradient  She will eventually benefit from cardiac MRI in the outpatient  Uptitrate beta-blocker for optimal blood pressure control and to maintain lower and heart rate  Given degree of total cholesterol and LDL level I increased Lipitor to 40 mg daily  Monitor closely on telemetry  Monitor electrolyte and keep potassium at 4 magnesium at 2  Obtain orthostatic vital signs  Avoid dehydration  Patient is to be advised to avoid skipping meals and to maintain optimal hydration in order to improve symptoms that may be caused by LVOT obstruction.  14-day Zio patch heart monitor when ready to discharge to rule out arrhythmia  Zio patch as ordered.        Greater than 50 minutes was spent

## 2024-06-21 NOTE — PLAN OF CARE
Problem: Discharge Planning  Goal: Discharge to home or other facility with appropriate resources  6/21/2024 0851 by Deisy Franks RN  Outcome: Progressing  6/20/2024 1954 by Lisbeth Quintana RN  Outcome: Progressing     Problem: Safety - Adult  Goal: Free from fall injury  6/21/2024 0851 by Deisy Franks RN  Outcome: Progressing  6/20/2024 1954 by Lisbeth Quintana RN  Outcome: Progressing     Problem: Chronic Conditions and Co-morbidities  Goal: Patient's chronic conditions and co-morbidity symptoms are monitored and maintained or improved  6/21/2024 0851 by Deisy Franks RN  Outcome: Progressing  6/20/2024 1954 by Lisbeth Quintana RN  Outcome: Progressing     Problem: Skin/Tissue Integrity  Goal: Absence of new skin breakdown  Description: 1.  Monitor for areas of redness and/or skin breakdown  2.  Assess vascular access sites hourly  3.  Every 4-6 hours minimum:  Change oxygen saturation probe site  4.  Every 4-6 hours:  If on nasal continuous positive airway pressure, respiratory therapy assess nares and determine need for appliance change or resting period.  6/21/2024 0851 by Deisy Franks RN  Outcome: Progressing  6/20/2024 1954 by Lisbeth Quintana RN  Outcome: Progressing     Problem: Pain  Goal: Verbalizes/displays adequate comfort level or baseline comfort level  6/21/2024 0851 by Deisy Franks RN  Outcome: Progressing  6/20/2024 1954 by Lisbeth Quintana RN  Outcome: Progressing     Problem: Respiratory - Adult  Goal: Achieves optimal ventilation and oxygenation  6/21/2024 0851 by Deisy Franks RN  Outcome: Progressing  6/20/2024 1955 by Lisbeth Quintana RN  Outcome: Progressing     Problem: Cardiovascular - Adult  Goal: Maintains optimal cardiac output and hemodynamic stability  6/21/2024 0851 by Deisy Franks RN  Outcome: Progressing  6/20/2024 1955 by Lisbeth Quintana RN  Outcome: Progressing  Goal: Absence of cardiac dysrhythmias or at

## 2024-06-21 NOTE — PROGRESS NOTES
[facility]  Internal Medicine Residency / House Medicine Service    Attending Physician Statement  I have discussed the case, including pertinent history and exam findings with the resident and the team.  I have seen and examined the patient and the key elements of the encounter have been performed by me.  I agree with the assessment, plan and orders as documented by the resident.      Steady improvement in conditiion  Unable to see this AM  In nuclear stress lab  Adjustments in meds being made for LVOT    Remainder of medical problems as per resident note.      Leonidas Velez MD FRCP [facility]  Internal Medicine Residency Faculty

## 2024-06-22 LAB
ANION GAP SERPL CALCULATED.3IONS-SCNC: 10 MMOL/L (ref 7–16)
BASOPHILS # BLD: 0.03 K/UL (ref 0–0.2)
BASOPHILS NFR BLD: 0 % (ref 0–2)
BUN SERPL-MCNC: 13 MG/DL (ref 6–23)
CALCIUM SERPL-MCNC: 9.4 MG/DL (ref 8.6–10.2)
CHLORIDE SERPL-SCNC: 99 MMOL/L (ref 98–107)
CO2 SERPL-SCNC: 27 MMOL/L (ref 22–29)
CREAT SERPL-MCNC: 0.5 MG/DL (ref 0.5–1)
DEPRECATED S PNEUM 1 IGG SER-MCNC: 0 AU/ML (ref 0–19)
EOSINOPHIL # BLD: 0.01 K/UL (ref 0.05–0.5)
EOSINOPHILS RELATIVE PERCENT: 0 % (ref 0–6)
ERYTHROCYTE [DISTWIDTH] IN BLOOD BY AUTOMATED COUNT: 14.5 % (ref 11.5–15)
GFR, ESTIMATED: >90 ML/MIN/1.73M2
GLUCOSE BLD-MCNC: 182 MG/DL (ref 74–99)
GLUCOSE BLD-MCNC: 210 MG/DL (ref 74–99)
GLUCOSE BLD-MCNC: 219 MG/DL (ref 74–99)
GLUCOSE BLD-MCNC: 229 MG/DL (ref 74–99)
GLUCOSE BLD-MCNC: 252 MG/DL (ref 74–99)
GLUCOSE SERPL-MCNC: 180 MG/DL (ref 74–99)
HCT VFR BLD AUTO: 43.4 % (ref 34–48)
HGB BLD-MCNC: 14.1 G/DL (ref 11.5–15.5)
IMM GRANULOCYTES # BLD AUTO: 0.22 K/UL (ref 0–0.58)
IMM GRANULOCYTES NFR BLD: 2 % (ref 0–5)
LYMPHOCYTES NFR BLD: 3.18 K/UL (ref 1.5–4)
LYMPHOCYTES RELATIVE PERCENT: 24 % (ref 20–42)
MAGNESIUM SERPL-MCNC: 2.4 MG/DL (ref 1.6–2.6)
MCH RBC QN AUTO: 30.3 PG (ref 26–35)
MCHC RBC AUTO-ENTMCNC: 32.5 G/DL (ref 32–34.5)
MCV RBC AUTO: 93.1 FL (ref 80–99.9)
MICROORGANISM SPEC CULT: NORMAL
MICROORGANISM SPEC CULT: NORMAL
MONOCYTES NFR BLD: 0.76 K/UL (ref 0.1–0.95)
MONOCYTES NFR BLD: 6 % (ref 2–12)
NEUTROPHILS NFR BLD: 69 % (ref 43–80)
NEUTS SEG NFR BLD: 9.34 K/UL (ref 1.8–7.3)
PHOSPHATE SERPL-MCNC: 3.9 MG/DL (ref 2.5–4.5)
PLATELET # BLD AUTO: 300 K/UL (ref 130–450)
PMV BLD AUTO: 9.4 FL (ref 7–12)
POTASSIUM SERPL-SCNC: 4.6 MMOL/L (ref 3.5–5)
RBC # BLD AUTO: 4.66 M/UL (ref 3.5–5.5)
SERINE PROTEASE 3, IGG: 0 AU/ML (ref 0–19)
SERVICE CMNT-IMP: NORMAL
SERVICE CMNT-IMP: NORMAL
SODIUM SERPL-SCNC: 136 MMOL/L (ref 132–146)
SPECIMEN DESCRIPTION: NORMAL
SPECIMEN DESCRIPTION: NORMAL
WBC OTHER # BLD: 13.5 K/UL (ref 4.5–11.5)

## 2024-06-22 PROCEDURE — 2580000003 HC RX 258

## 2024-06-22 PROCEDURE — 84100 ASSAY OF PHOSPHORUS: CPT

## 2024-06-22 PROCEDURE — 82962 GLUCOSE BLOOD TEST: CPT

## 2024-06-22 PROCEDURE — 94640 AIRWAY INHALATION TREATMENT: CPT

## 2024-06-22 PROCEDURE — 6360000002 HC RX W HCPCS

## 2024-06-22 PROCEDURE — 6370000000 HC RX 637 (ALT 250 FOR IP)

## 2024-06-22 PROCEDURE — 99233 SBSQ HOSP IP/OBS HIGH 50: CPT | Performed by: INTERNAL MEDICINE

## 2024-06-22 PROCEDURE — 6360000002 HC RX W HCPCS: Performed by: STUDENT IN AN ORGANIZED HEALTH CARE EDUCATION/TRAINING PROGRAM

## 2024-06-22 PROCEDURE — 2580000003 HC RX 258: Performed by: STUDENT IN AN ORGANIZED HEALTH CARE EDUCATION/TRAINING PROGRAM

## 2024-06-22 PROCEDURE — 1200000000 HC SEMI PRIVATE

## 2024-06-22 PROCEDURE — 6370000000 HC RX 637 (ALT 250 FOR IP): Performed by: NURSE PRACTITIONER

## 2024-06-22 PROCEDURE — 80048 BASIC METABOLIC PNL TOTAL CA: CPT

## 2024-06-22 PROCEDURE — 94669 MECHANICAL CHEST WALL OSCILL: CPT

## 2024-06-22 PROCEDURE — 2700000000 HC OXYGEN THERAPY PER DAY

## 2024-06-22 PROCEDURE — 83735 ASSAY OF MAGNESIUM: CPT

## 2024-06-22 PROCEDURE — 36415 COLL VENOUS BLD VENIPUNCTURE: CPT

## 2024-06-22 PROCEDURE — 85025 COMPLETE CBC W/AUTO DIFF WBC: CPT

## 2024-06-22 RX ORDER — INSULIN LISPRO 100 [IU]/ML
0-8 INJECTION, SOLUTION INTRAVENOUS; SUBCUTANEOUS
Status: DISCONTINUED | OUTPATIENT
Start: 2024-06-22 | End: 2024-06-23 | Stop reason: HOSPADM

## 2024-06-22 RX ORDER — INSULIN LISPRO 100 [IU]/ML
0-4 INJECTION, SOLUTION INTRAVENOUS; SUBCUTANEOUS NIGHTLY
Status: DISCONTINUED | OUTPATIENT
Start: 2024-06-22 | End: 2024-06-23 | Stop reason: HOSPADM

## 2024-06-22 RX ADMIN — WATER 40 MG: 1 INJECTION INTRAMUSCULAR; INTRAVENOUS; SUBCUTANEOUS at 10:11

## 2024-06-22 RX ADMIN — BUDESONIDE INHALATION 500 MCG: 0.5 SUSPENSION RESPIRATORY (INHALATION) at 19:27

## 2024-06-22 RX ADMIN — INSULIN LISPRO 1 UNITS: 100 INJECTION, SOLUTION INTRAVENOUS; SUBCUTANEOUS at 12:17

## 2024-06-22 RX ADMIN — ALBUTEROL SULFATE 2.5 MG: 2.5 SOLUTION RESPIRATORY (INHALATION) at 08:35

## 2024-06-22 RX ADMIN — INSULIN LISPRO 2 UNITS: 100 INJECTION, SOLUTION INTRAVENOUS; SUBCUTANEOUS at 16:36

## 2024-06-22 RX ADMIN — ENOXAPARIN SODIUM 40 MG: 100 INJECTION SUBCUTANEOUS at 10:10

## 2024-06-22 RX ADMIN — BUDESONIDE INHALATION 500 MCG: 0.5 SUSPENSION RESPIRATORY (INHALATION) at 08:35

## 2024-06-22 RX ADMIN — ARFORMOTEROL TARTRATE 15 MCG: 15 SOLUTION RESPIRATORY (INHALATION) at 19:27

## 2024-06-22 RX ADMIN — ACETYLCYSTEINE 600 MG: 200 SOLUTION ORAL; RESPIRATORY (INHALATION) at 08:35

## 2024-06-22 RX ADMIN — ALBUTEROL SULFATE 2.5 MG: 2.5 SOLUTION RESPIRATORY (INHALATION) at 12:08

## 2024-06-22 RX ADMIN — ACETYLCYSTEINE 600 MG: 200 SOLUTION ORAL; RESPIRATORY (INHALATION) at 19:26

## 2024-06-22 RX ADMIN — METOPROLOL SUCCINATE 25 MG: 25 TABLET, EXTENDED RELEASE ORAL at 16:36

## 2024-06-22 RX ADMIN — SODIUM CHLORIDE, PRESERVATIVE FREE 10 ML: 5 INJECTION INTRAVENOUS at 10:13

## 2024-06-22 RX ADMIN — INSULIN LISPRO 1 UNITS: 100 INJECTION, SOLUTION INTRAVENOUS; SUBCUTANEOUS at 10:10

## 2024-06-22 RX ADMIN — ATORVASTATIN CALCIUM 40 MG: 40 TABLET, FILM COATED ORAL at 10:10

## 2024-06-22 RX ADMIN — SODIUM CHLORIDE, PRESERVATIVE FREE 10 ML: 5 INJECTION INTRAVENOUS at 21:57

## 2024-06-22 RX ADMIN — CETIRIZINE HYDROCHLORIDE 10 MG: 10 TABLET, FILM COATED ORAL at 10:10

## 2024-06-22 RX ADMIN — ALBUTEROL SULFATE 2.5 MG: 2.5 SOLUTION RESPIRATORY (INHALATION) at 19:27

## 2024-06-22 RX ADMIN — Medication 2000 UNITS: at 10:09

## 2024-06-22 RX ADMIN — WATER 40 MG: 1 INJECTION INTRAMUSCULAR; INTRAVENOUS; SUBCUTANEOUS at 21:57

## 2024-06-22 RX ADMIN — ARFORMOTEROL TARTRATE 15 MCG: 15 SOLUTION RESPIRATORY (INHALATION) at 08:35

## 2024-06-22 RX ADMIN — METOPROLOL SUCCINATE 25 MG: 25 TABLET, EXTENDED RELEASE ORAL at 10:10

## 2024-06-22 ASSESSMENT — PAIN SCALES - GENERAL: PAINLEVEL_OUTOF10: 0

## 2024-06-22 NOTE — DISCHARGE INSTRUCTIONS
Internal medicine    Follow ups  Please follow up with the internal medicine clinic at [facility].  Please f/u with pulmonology clinic and cardiology clinic    Changes in healthcare   Please take all medications as indicated  Diet: regular diet   Activity: activity as tolerated  New Medications started during this hospital stay  Methyl-prednisone  Toporol-xl  Medications you should stop taking   Amlodipine  HCTZ  Additional labs, testing or imaging needed after discharge   Zio patch, 14 day monitoring  Cardiac MRI  Please contact us if you have any concerns, wish to change or make an appointment:  Internal medicine clinic   Phone: [phone]  Fax: [phone] [address] [facility] [address]  Or please call the nurse line at [phone].  Should you have further questions in regards to this visit, you can review your clinical note and after visit summary document on your [employer] account.  Other questions can be directed to our nurse line at [phone].     Other than any new prescriptions given to you today, the list of home medications on this After Visit Summary are based on information provided to us from you and your healthcare providers. This information, including the list, dose, and frequency of medications is only as accurate as the information you provided. If you have any questions or concerns about your home medications, please contact your Primary Care Physician for further clarification.

## 2024-06-22 NOTE — PROGRESS NOTES
[facility]  Internal Medicine Residency Program  Progress Note - House Team       Patient:  Estefani Chiu 68 y.o. female   MRN: [id number]       Date of Service: 6/22/2024    Subjective     Patient was seen and examined at bedside this morning, was sitting in bed, not in distress, on O2 @ 3L, symptomatically getting better.  No new complaints.  No significant or concerning events otherwise.    Objective       Physical Exam  Vitals: /73   Pulse 68   Temp 98.2 °F (36.8 °C) (Temporal)   Resp 17   Ht 1.575 m (5' 2\")   Wt 79.4 kg (175 lb)   SpO2 93%   BMI 32.01 kg/m²     I & O - 24hr: No intake/output data recorded.   General Appearance: alert, appears stated age, and cooperative  HEENT:  Head: Normal, normocephalic, atraumatic.  Lung: b/l equal air entry, minimal wheeze over b/l mid-lower lung field, improved from previous examination  Heart: s1s2  Abdomen: soft, non-tender; bowel sounds normal; no masses,  no organomegaly  Extremities:  extremities normal, atraumatic, no cyanosis or edema  Neurologic: Mental status: Alert, oriented, thought content appropriate  Diffusely scattered non painful and non discharging hyperpigmented maculopapular lesion of 2-4 mm in diameter over the upper back on the background of diffuse hyperkeratotic and hyperpigmented skin.    Diet:   ADULT DIET; Regular      Pertinent Labs & Imaging Studies     Labs    CBC with Differential:    Lab Results   Component Value Date/Time    WBC 13.5 06/22/2024 04:09 AM    RBC 4.66 06/22/2024 04:09 AM    HGB 14.1 06/22/2024 04:09 AM    HCT 43.4 06/22/2024 04:09 AM     06/22/2024 04:09 AM    MCV 93.1 06/22/2024 04:09 AM    MCH 30.3 06/22/2024 04:09 AM    MCHC 32.5 06/22/2024 04:09 AM    RDW 14.5 06/22/2024 04:09 AM    LYMPHOPCT 24 06/22/2024 04:09 AM    MONOPCT 6 06/22/2024 04:09 AM    EOSPCT 0 06/22/2024 04:09 AM    BASOPCT 0 06/22/2024 04:09 AM    MONOSABS 0.76 06/22/2024 04:09 AM    LYMPHSABS 3.15 01/07/2014  11:17 AM    EOSABS 0.01 06/22/2024 04:09 AM    BASOSABS 0.03 06/22/2024 04:09 AM     Hemoglobin/Hematocrit:    Lab Results   Component Value Date/Time    HGB 14.1 06/22/2024 04:09 AM    HCT 43.4 06/22/2024 04:09 AM     CMP:    Lab Results   Component Value Date/Time     06/22/2024 04:09 AM    K 4.6 06/22/2024 04:09 AM    CL 99 06/22/2024 04:09 AM    CO2 27 06/22/2024 04:09 AM    BUN 13 06/22/2024 04:09 AM    CREATININE 0.5 06/22/2024 04:09 AM    GFRAA >60 03/24/2014 03:27 PM    LABGLOM >90 06/22/2024 04:09 AM    LABGLOM >90 04/05/2024 07:28 PM    GLUCOSE 180 06/22/2024 04:09 AM    CALCIUM 9.4 06/22/2024 04:09 AM    BILITOT 0.6 06/17/2024 09:15 AM    ALKPHOS 109 06/17/2024 09:15 AM    AST 18 06/17/2024 09:15 AM    ALT 10 06/17/2024 09:15 AM     Sodium:    Lab Results   Component Value Date/Time     06/22/2024 04:09 AM     Potassium:    Lab Results   Component Value Date/Time    K 4.6 06/22/2024 04:09 AM       Imaging Studies:    CTA PULMONARY W CONTRAST   Final Result   1.  Limited study by artifacts obscuring detail.  The cannot see   conspicuously acute central pulmonary embolus in the right or in the left   lung pulmonary artery circulations in the central pulmonary arteries of the   mediastinum.      2.  Continued pattern of discrete vague ill-defined ground-glass opacities   throughout the lung form a mosaic pattern.  Can represent form of a chronic   of L itis as observed previously.  Please correlate clinically.      3.  Stable mild mediastinal adenopathy.         XR CHEST PORTABLE   Final Result   1. Patchy bilateral perihilar and lower lobe airspace disease.             Resident's Assessment and Plan     Acute hypoxic respiratory insufficiency likely 2/2 chronic bronchitis vs  asthma vs sarcoidosis ??eosinophilic pneumonitis  CT chest:-Discrete vague ill-defined groundglass opacities throughout the lung, mosaic pattern.    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, mean gradient  12 mmHg  Leukocytosis, likely secondary to steroid use, resolving  Type 2 diabetes mellitus    Cavernous hemangioma on the right hepatic lobe, 5mm  -Flash fill hemangioma on the right side, 7mm  Hypertension   Mild CAD on imaging  Class 1 obesity  H/O tobacco abuse Smoker    Hilar and Mediastinal Adenopathy  on past imaging  Elevated IgE  Past H/O stroke   Vit D deficiency    Monitor respiratory status, wean oxygen to baseline if tolerated  On IV steroids, to continue; currently on methylprednisone 40mg q12h  Off antibiotic  EF 65-70%, LVOT obs at rest, mean gradient 12 mmHg, No Aortic stenosis  -Cardiology consulted, appreciate recs, recommended to change antiHTN (metoprolol succi 25, d/c ed amlod and HCTZ)  -Myocardial perfusion stress test :-low risk of cardiac events, Post-stress ejection fraction is 83%, stress end diastolic and end systolic cavity size is normal.  -14 day zio at discharge  -Orthostatic BP  Continue breathing treatments, Brovana Pulmicort and proventil  On low-dose sliding scale  Pulmonology consulted, appreciate recommendation  -Follow MPO and PR3 antibody, and aspergillus antibody  -Plan EBUS and biopsy, as outpatient  D/C home after pulmonology and cardiology clearanc      DVT prophylaxis: Lovenox  Diet:   No diet orders on file   Bowel regimen: PRN  Pain management: as needed  Code status: No Order   Disposition: Continue current treatment  Family: updated as available      Jaylene Wood MD, PGY-1   Attending physician: Dr. Velez

## 2024-06-22 NOTE — PLAN OF CARE
Problem: Discharge Planning  Goal: Discharge to home or other facility with appropriate resources  6/21/2024 2149 by Jennifer Gregory RN  Outcome: Progressing  6/21/2024 0851 by Deisy Franks RN  Outcome: Progressing     Problem: Safety - Adult  Goal: Free from fall injury  6/21/2024 2149 by Jennifer Gregory RN  Outcome: Progressing  6/21/2024 0851 by Deisy Franks RN  Outcome: Progressing     Problem: Chronic Conditions and Co-morbidities  Goal: Patient's chronic conditions and co-morbidity symptoms are monitored and maintained or improved  6/21/2024 0851 by Deisy Franks RN  Outcome: Progressing     Problem: Skin/Tissue Integrity  Goal: Absence of new skin breakdown  Description: 1.  Monitor for areas of redness and/or skin breakdown  2.  Assess vascular access sites hourly  3.  Every 4-6 hours minimum:  Change oxygen saturation probe site  4.  Every 4-6 hours:  If on nasal continuous positive airway pressure, respiratory therapy assess nares and determine need for appliance change or resting period.  6/21/2024 0851 by Deisy Franks RN  Outcome: Progressing     Problem: Pain  Goal: Verbalizes/displays adequate comfort level or baseline comfort level  6/21/2024 0851 by Deisy Franks RN  Outcome: Progressing     Problem: Respiratory - Adult  Goal: Achieves optimal ventilation and oxygenation  6/21/2024 0851 by Deisy Franks RN  Outcome: Progressing     Problem: Cardiovascular - Adult  Goal: Maintains optimal cardiac output and hemodynamic stability  6/21/2024 0851 by Deisy Franks RN  Outcome: Progressing  Goal: Absence of cardiac dysrhythmias or at baseline  6/21/2024 0851 by Deisy Franks RN  Outcome: Progressing

## 2024-06-22 NOTE — PLAN OF CARE
Problem: Discharge Planning  Goal: Discharge to home or other facility with appropriate resources  6/22/2024 1049 by Sue Cali RN  Outcome: Progressing  6/21/2024 2149 by Jennifer Gregory RN  Outcome: Progressing     Problem: Safety - Adult  Goal: Free from fall injury  6/22/2024 1049 by Sue Cali RN  Outcome: Progressing  6/21/2024 2149 by Jennifer Gregory RN  Outcome: Progressing     Problem: Chronic Conditions and Co-morbidities  Goal: Patient's chronic conditions and co-morbidity symptoms are monitored and maintained or improved  Outcome: Progressing     Problem: Skin/Tissue Integrity  Goal: Absence of new skin breakdown  Description: 1.  Monitor for areas of redness and/or skin breakdown  2.  Assess vascular access sites hourly  3.  Every 4-6 hours minimum:  Change oxygen saturation probe site  4.  Every 4-6 hours:  If on nasal continuous positive airway pressure, respiratory therapy assess nares and determine need for appliance change or resting period.  Outcome: Progressing     Problem: Pain  Goal: Verbalizes/displays adequate comfort level or baseline comfort level  Outcome: Progressing     Problem: Cardiovascular - Adult  Goal: Maintains optimal cardiac output and hemodynamic stability  Outcome: Progressing  Goal: Absence of cardiac dysrhythmias or at baseline  Outcome: Progressing

## 2024-06-22 NOTE — PROGRESS NOTES
[facility]  Internal Medicine Residency / House Medicine Service    Attending Physician Statement  I have discussed the case, including pertinent history and exam findings with the resident and the team.  I have seen and examined the patient and the key elements of the encounter have been performed by me.  I agree with the assessment, plan and orders as documented by the resident.      Family in room  And breathing much better  H&L markedly improved  Mobility fine  Remains on O2  MEDS  amlodipine stopped, metoprolol started  For LVOT improvement  Steroids continue  Plan: Discharge planning on O2            Follow up bronchoscopy with pulmonary     Remainder of medical problems as per resident note.      Leonidas Velez MD FRCP [facility]  Internal Medicine Residency Faculty

## 2024-06-22 NOTE — PROGRESS NOTES
Pulmonary/Critical Care Medicine    Progress Note    SUBJECTIVE: Reports that overall her breathing has improved today, states it does not feel as tight. Ongoing work up by cardiology.       PHYSICAL EXAM:    Vitals:    /73   Pulse 68   Temp 98.2 °F (36.8 °C) (Temporal)   Resp 17   Ht 1.575 m (5' 2\")   Wt 79.4 kg (175 lb)   SpO2 93%   BMI 32.01 kg/m²     General appearance: alert and cooperative  Head: Normocephalic, without obvious abnormality  Neck: no JVD  Lungs: Currently CTA bilaterally, no use of accessory muscles.   Heart: regular rate and rhythm, S1, S2 normal, no murmur, click, rub or gallop  Abdomen: soft, non-tender; bowel sounds normal; no masses,  no organomegaly  Extremities: extremities normal, atraumatic, no cyanosis or edema      Objective:     Patient Vitals for the past 8 hrs:   BP Temp Temp src Pulse Resp SpO2   06/21/24 2130 133/73 98.2 °F (36.8 °C) Temporal 68 17 93 %   06/21/24 1928 -- -- -- 72 16 92 %       ECG: sinus tachycardia  CXR: Pathcy BL perihilar and lower lobe airspace disease    ECHO:    Left Ventricle: Hyperdynamic left ventricular systolic function with a visually estimated EF of 65 - 70%. Findings consistent with mild asymmetric hypertrophy. Elevated left ventricular filling pressure. LVOT   obstruction at rest. LVOT peak gradient at rest is 12 mmHg.     Right Ventricle: Right ventricle size is normal. Normal systolic   function.    Aortic Valve: No stenosis. AV area by continuity VTI is 2.6 cm2. AV   area by peak velocity is 2.7 cm2.     Mitral Valve: MV mean gradient is 2 mmHg. Mild regurgitation. No   stenosis noted. MV mean gradient is 2 mmHg. MV area by continuity equation is 3.8 cm2.     Tricuspid Valve: Mild regurgitation.     Pulmonic Valve: Trace regurgitation.     Image quality is fair.       Data Review  CBC with Differential:    Lab Results   Component Value Date/Time    WBC 11.5 06/21/2024 04:22 AM    RBC 4.54 06/21/2024 04:22 AM    HGB 13.8 06/21/2024

## 2024-06-22 NOTE — PROGRESS NOTES
Inpatient Cardiology Consultation      Reason for Consult:  Increased SOB (at 3 L o2 baseline), Last Echo: LVOT obstruction at rest. LVOT peak gradient at rest is 12 mmHg.     Consulting Physician: Dr. Aguila    Requesting Physician:  Dr. Wood     Date of Consultation: 6/22/2024    HISTORY OF PRESENT ILLNESS: 68 y.o. AA female, not known to any cardiology.    Presented to ED for evaluation of 4 day history of SOB, progressively worsening, and 2 day history of chest pain, as per ED note.     Interim  Stress test was fine revealing no ischemia  Patient continues to have wheezes and shortness of breath as well as nasal congestion    Past Medical History:    HTN  Chest pain  SOB  T2 DM - Metformin  1/07/2014 A1C 7.1  12/27/2023 A1C 7.0  3/06/2024 A1C 6.9  HLD - not on statin  Bursitis  Arthritis  Carpal Tunnel  Pinched nerve in elbow  Sinusitis   CVA 2009 - reports no residuals  Former     Past Surgical History:  Cholecystectomy (1990's), tubal ligation  History reviewed. No pertinent surgical history.    Medications Prior to admit:  Prior to Admission medications    Medication Sig Start Date End Date Taking? Authorizing Provider   fluticasone (FLONASE) 50 MCG/ACT nasal spray 1 spray by Each Nostril route daily as needed for Rhinitis   Yes Provider, MD Sukhwinder   atorvastatin (LIPITOR) 20 MG tablet Take 1 tablet by mouth daily 6/10/24   Vernon Amador Jr., DO   hydroCHLOROthiazide (HYDRODIURIL) 25 MG tablet Take 0.5 tablets by mouth every morning 6/10/24   Vernon Amador Jr., DO   ibuprofen (ADVIL;MOTRIN) 800 MG tablet Take 1 tablet by mouth every 8 hours as needed for Pain 5/29/24   Cindy Sandoval, DO   metFORMIN (GLUCOPHAGE) 500 MG tablet Take 1 tablet by mouth 2 times daily (with meals) 3/6/24   Maxwell Garcia MD   cetirizine (ZYRTEC) 10 MG tablet Take 1 tablet by mouth daily 2/22/24   Cindy Sandoval, DO   albuterol sulfate HFA (VENTOLIN HFA) 108 (90 Base) MCG/ACT inhaler Inhale 2 puffs into  Deferred  SKIN: Warm and dry no statis dermatitis or ulcers   NEURO / PSYCH: Oriented to person, place and time. Speech clear and appropriate. Follows all commands. Pleasant affect     Wt Readings from Last 3 Encounters:   06/21/24 79.4 kg (175 lb)   05/29/24 79.6 kg (175 lb 6.4 oz)   04/15/24 77.1 kg (170 lb)     DATA:    ECG: As per Dr. Buchanan's interpretation  Tele strips: Not on monitor    Diagnostic:  TTE  6/19/2024: Hyperdynamic LV systolic function with a visually estimated EF of 65 - 70%. Findings consistent with mild asymmetric hypertrophy. Elevated LV filling pressure. LVOT obstruction at rest. LVOT peak gradient at rest is 12 mmHg. RV size and function is normal. AV no stenosis. AV area by continuity VTI is 2.6 cm2. AV area by peak velocity is 2.7 cm2. MV mean gradient is 2 mmHg. Mild regurgitation. No stenosis noted. MV mean gradient is 2 mmHg. MV area by continuity equation is 3.8 cm2. Mild TV regurgitation. Trace PV regurgitation.    CTA CHEST W CONTRAST 6/17/2024:  Limited study by artifacts obscuring detail.  The cannot see conspicuously acute central pulmonary embolus in the right or in the left lung pulmonary artery circulations in the central pulmonary arteries of the mediastinum. Continued pattern of discrete vague ill-defined ground-glass opacities throughout the lung form a mosaic pattern.  Can represent form of a chronic of L itis as observed previously.  Please correlate clinically. Stable mild mediastinal adenopathy.     XR  6/17/2024: Patchy bilateral perihilar and lower lobe airspace disease.          Intake/Output Summary (Last 24 hours) at 6/22/2024 1945  Last data filed at 6/22/2024 0800  Gross per 24 hour   Intake 360 ml   Output --   Net 360 ml       Labs:   CBC:   Recent Labs     06/20/24  0553 06/21/24  0422 06/22/24  0409   WBC 10.1 11.5 13.5*   HGB 13.6 13.8 14.1   HCT 43.0 42.6 43.4    284 300     BMP:   Recent Labs     06/21/24  0422 06/22/24  0409    136   K 4.7  no evidence of transient ischemic dilation (TID).    ECG: Resting ECG demonstrates normal sinus rhythm.    ECG: The stress ECG was negative for ischemia.    Stress Test: A pharmacological stress test was performed using regadenoson (Lexiscan). PO caffeine given as a reversal agent.    TTE  6/19/2024: Hyperdynamic LV systolic function with a visually estimated EF of 65 - 70%. Findings consistent with mild asymmetric hypertrophy. Elevated LV filling pressure. LVOT obstruction at rest. LVOT peak gradient at rest is 12 mmHg. RV size and function is normal. AV no stenosis. AV area by continuity VTI is 2.6 cm2. AV area by peak velocity is 2.7 cm2. MV mean gradient is 2 mmHg. Mild regurgitation. No stenosis noted. MV mean gradient is 2 mmHg. MV area by continuity equation is 3.8 cm2. Mild TV regurgitation. Trace PV regurgitation.     EKG June 17, 2024 revealed sinus tachycardia 115 bpm and nonspecific ST-T wave changes with high lateral Q waves  Assessment    Hyperdynamic left ventricle  Borderline troponin leak  HLP  LVOT obstruction  FAY  Dizziness/LH  SOB  HTN  Chest pain  SOB  Acute Hypoxic respiratory failure  Chronic respiratory failure with 4 L O2 nasal,  T2 DM - Metformin  1/07/2014 A1C 7.1  12/27/2023 A1C 7.0  3/06/2024 A1C 6.9  HLD - not on statin  Bursitis  Arthritis  Carpal Tunnel  Pinched nerve in elbow  Sinusitis   CVA 2009 - reports no residuals  Former     Plan:   Avoid tachycardia and dehydration given LVOT gradient  Continue on beta-blocker if there is no contraindication  Evaluation and management of underlying lung disease per primary and pulmonology  14-day Zio patch heart monitor to rule out arrhythmias  She will eventually benefit from cardiac MRI in the outpatient  Uptitrate beta-blocker for optimal blood pressure control and prevent fast heart rate  Given degree of total cholesterol and LDL level we will increase Lipitor to 40 mg daily  Monitor closely on telemetry  Monitor electrolyte and keep

## 2024-06-22 NOTE — PROGRESS NOTES
Pulmonary/Critical Care Medicine  Resident Progress Note    SUBJECTIVE:       Patient still having significant nasal congestion, sinus drainage and reports occasional bloody drainage.  Still having significant shortness of breath and wheezing.  Exam with bilateral wheezing.  Will get CT sinus, ANCA panel 6/19 pending  Aspergillus galactomannan negative    PHYSICAL EXAM:    Vitals:    /85   Pulse 62   Temp 97.9 °F (36.6 °C) (Temporal)   Resp 18   Ht 1.575 m (5' 2\")   Wt 79.4 kg (175 lb)   SpO2 90%   BMI 32.01 kg/m²     General appearance: alert and cooperative  Head: Normocephalic, without obvious abnormality  Neck: no JVD  Lungs: wheezes anterior - bilateral  Heart: regular rate and rhythm, S1, S2 normal, no murmur, click, rub or gallop  Abdomen: soft, non-tender; bowel sounds normal; no masses,  no organomegaly  Extremities: extremities normal, atraumatic, no cyanosis or edema      Objective:     Patient Vitals for the past 8 hrs:   BP Temp Temp src Pulse Resp SpO2   06/22/24 1209 -- -- -- -- -- 90 %   06/22/24 0835 -- -- -- -- -- 92 %   06/22/24 0814 139/85 97.9 °F (36.6 °C) Temporal 62 18 93 %         ECG: sinus tachycardia  CXR: Pathcy BL perihilar and lower lobe airspace disease    ECHO:    Left Ventricle: Hyperdynamic left ventricular systolic function with a visually estimated EF of 65 - 70%. Findings consistent with mild asymmetric hypertrophy. Elevated left ventricular filling pressure. LVOT   obstruction at rest. LVOT peak gradient at rest is 12 mmHg.     Right Ventricle: Right ventricle size is normal. Normal systolic   function.    Aortic Valve: No stenosis. AV area by continuity VTI is 2.6 cm2. AV   area by peak velocity is 2.7 cm2.     Mitral Valve: MV mean gradient is 2 mmHg. Mild regurgitation. No   stenosis noted. MV mean gradient is 2 mmHg. MV area by continuity equation is 3.8 cm2.     Tricuspid Valve: Mild regurgitation.     Pulmonic Valve: Trace regurgitation.     Image quality is

## 2024-06-23 ENCOUNTER — APPOINTMENT (OUTPATIENT)
Dept: CT IMAGING | Age: 69
DRG: 189 | End: 2024-06-23
Payer: MEDICARE

## 2024-06-23 VITALS
DIASTOLIC BLOOD PRESSURE: 66 MMHG | RESPIRATION RATE: 18 BRPM | SYSTOLIC BLOOD PRESSURE: 130 MMHG | BODY MASS INDEX: 32.2 KG/M2 | OXYGEN SATURATION: 95 % | TEMPERATURE: 97.2 F | HEART RATE: 63 BPM | HEIGHT: 62 IN | WEIGHT: 175 LBS

## 2024-06-23 LAB
GLUCOSE BLD-MCNC: 243 MG/DL (ref 74–99)
GLUCOSE BLD-MCNC: 308 MG/DL (ref 74–99)

## 2024-06-23 PROCEDURE — 94640 AIRWAY INHALATION TREATMENT: CPT

## 2024-06-23 PROCEDURE — 70486 CT MAXILLOFACIAL W/O DYE: CPT

## 2024-06-23 PROCEDURE — 93242 EXT ECG>48HR<7D RECORDING: CPT

## 2024-06-23 PROCEDURE — 6370000000 HC RX 637 (ALT 250 FOR IP)

## 2024-06-23 PROCEDURE — 6360000002 HC RX W HCPCS

## 2024-06-23 PROCEDURE — 82962 GLUCOSE BLOOD TEST: CPT

## 2024-06-23 PROCEDURE — 6360000002 HC RX W HCPCS: Performed by: STUDENT IN AN ORGANIZED HEALTH CARE EDUCATION/TRAINING PROGRAM

## 2024-06-23 PROCEDURE — 99233 SBSQ HOSP IP/OBS HIGH 50: CPT | Performed by: INTERNAL MEDICINE

## 2024-06-23 PROCEDURE — 6370000000 HC RX 637 (ALT 250 FOR IP): Performed by: NURSE PRACTITIONER

## 2024-06-23 PROCEDURE — 99232 SBSQ HOSP IP/OBS MODERATE 35: CPT | Performed by: INTERNAL MEDICINE

## 2024-06-23 PROCEDURE — 2580000003 HC RX 258

## 2024-06-23 PROCEDURE — 2700000000 HC OXYGEN THERAPY PER DAY

## 2024-06-23 PROCEDURE — 2580000003 HC RX 258: Performed by: STUDENT IN AN ORGANIZED HEALTH CARE EDUCATION/TRAINING PROGRAM

## 2024-06-23 RX ORDER — CHOLECALCIFEROL (VITAMIN D3) 50 MCG
2000 TABLET ORAL DAILY
Qty: 90 TABLET | Refills: 1 | Status: SHIPPED | OUTPATIENT
Start: 2024-06-23

## 2024-06-23 RX ORDER — PREDNISONE 20 MG/1
60 TABLET ORAL DAILY
Qty: 15 TABLET | Refills: 0 | Status: SHIPPED | OUTPATIENT
Start: 2024-06-23 | End: 2024-06-28

## 2024-06-23 RX ORDER — PREDNISONE 20 MG/1
20 TABLET ORAL DAILY
Qty: 5 TABLET | Refills: 0 | Status: SHIPPED | OUTPATIENT
Start: 2024-07-05 | End: 2024-07-10

## 2024-06-23 RX ORDER — PREDNISONE 20 MG/1
40 TABLET ORAL DAILY
Qty: 10 TABLET | Refills: 0 | Status: SHIPPED | OUTPATIENT
Start: 2024-06-29 | End: 2024-07-04

## 2024-06-23 RX ORDER — METOPROLOL SUCCINATE 25 MG/1
25 TABLET, EXTENDED RELEASE ORAL 2 TIMES DAILY WITH MEALS
Qty: 90 TABLET | Refills: 1 | Status: SHIPPED | OUTPATIENT
Start: 2024-06-23 | End: 2024-07-25 | Stop reason: ALTCHOICE

## 2024-06-23 RX ORDER — ARFORMOTEROL TARTRATE 15 UG/2ML
15 SOLUTION RESPIRATORY (INHALATION)
Qty: 120 ML | Refills: 1 | Status: SHIPPED | OUTPATIENT
Start: 2024-06-23

## 2024-06-23 RX ORDER — PREDNISONE 1 MG/1
5 TABLET ORAL DAILY
Qty: 10 TABLET | Refills: 0 | Status: SHIPPED | OUTPATIENT
Start: 2024-07-15 | End: 2024-07-17

## 2024-06-23 RX ORDER — BUDESONIDE 0.5 MG/2ML
0.5 INHALANT ORAL
Qty: 60 EACH | Refills: 1 | Status: SHIPPED | OUTPATIENT
Start: 2024-06-23

## 2024-06-23 RX ORDER — PREDNISONE 10 MG/1
10 TABLET ORAL DAILY
Qty: 3 TABLET | Refills: 0 | Status: SHIPPED | OUTPATIENT
Start: 2024-07-11 | End: 2024-07-14

## 2024-06-23 RX ADMIN — ENOXAPARIN SODIUM 40 MG: 100 INJECTION SUBCUTANEOUS at 08:45

## 2024-06-23 RX ADMIN — ALBUTEROL SULFATE 2.5 MG: 2.5 SOLUTION RESPIRATORY (INHALATION) at 12:42

## 2024-06-23 RX ADMIN — Medication 2000 UNITS: at 08:44

## 2024-06-23 RX ADMIN — METOPROLOL SUCCINATE 25 MG: 25 TABLET, EXTENDED RELEASE ORAL at 08:44

## 2024-06-23 RX ADMIN — BUDESONIDE INHALATION 500 MCG: 0.5 SUSPENSION RESPIRATORY (INHALATION) at 07:58

## 2024-06-23 RX ADMIN — INSULIN LISPRO 2 UNITS: 100 INJECTION, SOLUTION INTRAVENOUS; SUBCUTANEOUS at 08:45

## 2024-06-23 RX ADMIN — ARFORMOTEROL TARTRATE 15 MCG: 15 SOLUTION RESPIRATORY (INHALATION) at 07:58

## 2024-06-23 RX ADMIN — SODIUM CHLORIDE, PRESERVATIVE FREE 10 ML: 5 INJECTION INTRAVENOUS at 08:45

## 2024-06-23 RX ADMIN — ACETYLCYSTEINE 600 MG: 200 SOLUTION ORAL; RESPIRATORY (INHALATION) at 07:58

## 2024-06-23 RX ADMIN — ALBUTEROL SULFATE 2.5 MG: 2.5 SOLUTION RESPIRATORY (INHALATION) at 07:58

## 2024-06-23 RX ADMIN — CETIRIZINE HYDROCHLORIDE 10 MG: 10 TABLET, FILM COATED ORAL at 08:44

## 2024-06-23 RX ADMIN — ATORVASTATIN CALCIUM 40 MG: 40 TABLET, FILM COATED ORAL at 08:44

## 2024-06-23 RX ADMIN — WATER 40 MG: 1 INJECTION INTRAMUSCULAR; INTRAVENOUS; SUBCUTANEOUS at 08:49

## 2024-06-23 RX ADMIN — INSULIN LISPRO 6 UNITS: 100 INJECTION, SOLUTION INTRAVENOUS; SUBCUTANEOUS at 11:52

## 2024-06-23 NOTE — PROGRESS NOTES
Pulmonary/Critical Care Medicine  Resident Progress Note    SUBJECTIVE:       Patient still having significant nasal congestion, sinus drainage and reports occasional bloody drainage.  Still having significant shortness of breath and wheezing.  Exam with bilateral wheezing.  Will get CT sinus, ANCA panel 6/19 pending  Aspergillus galactomannan negative    PHYSICAL EXAM:    Vitals:    /66   Pulse 63   Temp 97.2 °F (36.2 °C) (Temporal)   Resp 18   Ht 1.575 m (5' 2\")   Wt 79.4 kg (175 lb)   SpO2 95%   BMI 32.01 kg/m²     General appearance: alert and cooperative  Head: Normocephalic, without obvious abnormality  Neck: no JVD  Lungs: wheezes anterior - bilateral  Heart: regular rate and rhythm, S1, S2 normal, no murmur, click, rub or gallop  Abdomen: soft, non-tender; bowel sounds normal; no masses,  no organomegaly  Extremities: extremities normal, atraumatic, no cyanosis or edema      Objective:     Patient Vitals for the past 8 hrs:   BP Temp Temp src Pulse Resp SpO2   06/23/24 1242 -- -- -- -- -- 95 %   06/23/24 0758 -- -- -- -- -- 95 %   06/23/24 0724 130/66 97.2 °F (36.2 °C) Temporal 63 18 96 %         ECG: sinus tachycardia  CXR: Pathcy BL perihilar and lower lobe airspace disease    ECHO:    Left Ventricle: Hyperdynamic left ventricular systolic function with a visually estimated EF of 65 - 70%. Findings consistent with mild asymmetric hypertrophy. Elevated left ventricular filling pressure. LVOT   obstruction at rest. LVOT peak gradient at rest is 12 mmHg.     Right Ventricle: Right ventricle size is normal. Normal systolic   function.    Aortic Valve: No stenosis. AV area by continuity VTI is 2.6 cm2. AV   area by peak velocity is 2.7 cm2.     Mitral Valve: MV mean gradient is 2 mmHg. Mild regurgitation. No   stenosis noted. MV mean gradient is 2 mmHg. MV area by continuity equation is 3.8 cm2.     Tricuspid Valve: Mild regurgitation.     Pulmonic Valve: Trace regurgitation.     Image quality is  fair.       Data Review  CBC with Differential:    Lab Results   Component Value Date/Time    WBC 13.5 06/22/2024 04:09 AM    RBC 4.66 06/22/2024 04:09 AM    HGB 14.1 06/22/2024 04:09 AM    HCT 43.4 06/22/2024 04:09 AM     06/22/2024 04:09 AM    MCV 93.1 06/22/2024 04:09 AM    MCH 30.3 06/22/2024 04:09 AM    MCHC 32.5 06/22/2024 04:09 AM    RDW 14.5 06/22/2024 04:09 AM     BMP:   Lab Results   Component Value Date/Time    GLUCOSE 180 06/22/2024 04:09 AM    CO2 27 06/22/2024 04:09 AM    BUN 13 06/22/2024 04:09 AM    CREATININE 0.5 06/22/2024 04:09 AM    CALCIUM 9.4 06/22/2024 04:09 AM       Assessment:    Acute hypoxic respiratory failure  LVOT obstruction  Orthopnea  Elevated IgE level > 7000 rule out APBA,and EGS  Peripheral eosinophilia  Hilar adenopathy  Hypertension  Diabetes  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

## 2024-06-23 NOTE — PLAN OF CARE
Problem: Discharge Planning  Goal: Discharge to home or other facility with appropriate resources  6/23/2024 0045 by Carisa Hernandez RN  Outcome: Progressing  6/22/2024 1049 by Sue Cali RN  Outcome: Progressing     Problem: Safety - Adult  Goal: Free from fall injury  6/23/2024 0045 by Carisa Hernandez RN  Outcome: Progressing  6/22/2024 1049 by Sue Cali RN  Outcome: Progressing     Problem: Chronic Conditions and Co-morbidities  Goal: Patient's chronic conditions and co-morbidity symptoms are monitored and maintained or improved  6/23/2024 0045 by Carisa Hernandez RN  Outcome: Progressing  6/22/2024 1049 by Seu Cali RN  Outcome: Progressing     Problem: Skin/Tissue Integrity  Goal: Absence of new skin breakdown  Description: 1.  Monitor for areas of redness and/or skin breakdown  2.  Assess vascular access sites hourly  3.  Every 4-6 hours minimum:  Change oxygen saturation probe site  4.  Every 4-6 hours:  If on nasal continuous positive airway pressure, respiratory therapy assess nares and determine need for appliance change or resting period.  6/23/2024 0045 by Carisa Hernandez RN  Outcome: Progressing  6/22/2024 1049 by Sue Cali RN  Outcome: Progressing     Problem: Pain  Goal: Verbalizes/displays adequate comfort level or baseline comfort level  6/23/2024 0045 by Carisa Hernandez RN  Outcome: Progressing  6/22/2024 1049 by Sue Cali RN  Outcome: Progressing     Problem: Respiratory - Adult  Goal: Achieves optimal ventilation and oxygenation  6/23/2024 0045 by Carisa Hernandez RN  Outcome: Progressing  6/22/2024 1049 by Sue Cali RN  Outcome: Progressing     Problem: Cardiovascular - Adult  Goal: Maintains optimal cardiac output and hemodynamic stability  6/23/2024 0045 by Carisa Hernandez RN  Outcome: Progressing  6/22/2024 1049 by Sue Cali RN  Outcome: Progressing  Goal: Absence of cardiac dysrhythmias or at baseline  6/23/2024 0045 by Carisa Hernandez RN  Outcome:  Progressing  6/22/2024 1049 by Sue Cali, RN  Outcome: Progressing

## 2024-06-23 NOTE — PLAN OF CARE
Problem: Discharge Planning  Goal: Discharge to home or other facility with appropriate resources  6/23/2024 1023 by Sue Cali RN  Outcome: Progressing  6/23/2024 0045 by Carisa Hernandez RN  Outcome: Progressing     Problem: Safety - Adult  Goal: Free from fall injury  6/23/2024 1023 by Sue Cali RN  Outcome: Progressing  6/23/2024 0045 by Carisa Hernandez RN  Outcome: Progressing     Problem: Chronic Conditions and Co-morbidities  Goal: Patient's chronic conditions and co-morbidity symptoms are monitored and maintained or improved  6/23/2024 1023 by Sue Cali RN  Outcome: Progressing  6/23/2024 0045 by Carisa Hernandez RN  Outcome: Progressing     Problem: Skin/Tissue Integrity  Goal: Absence of new skin breakdown  Description: 1.  Monitor for areas of redness and/or skin breakdown  2.  Assess vascular access sites hourly  3.  Every 4-6 hours minimum:  Change oxygen saturation probe site  4.  Every 4-6 hours:  If on nasal continuous positive airway pressure, respiratory therapy assess nares and determine need for appliance change or resting period.  6/23/2024 1023 by Sue Cali RN  Outcome: Progressing  6/23/2024 0045 by Carisa Hernandez RN  Outcome: Progressing     Problem: Pain  Goal: Verbalizes/displays adequate comfort level or baseline comfort level  6/23/2024 1023 by Sue Cali RN  Outcome: Progressing  6/23/2024 0045 by Carisa Hernandez RN  Outcome: Progressing     Problem: Respiratory - Adult  Goal: Achieves optimal ventilation and oxygenation  6/23/2024 1023 by Sue Cali RN  Outcome: Progressing  6/23/2024 0045 by Carisa Hernandez RN  Outcome: Progressing     Problem: Cardiovascular - Adult  Goal: Maintains optimal cardiac output and hemodynamic stability  6/23/2024 1023 by Sue Cali RN  Outcome: Progressing  6/23/2024 0045 by Carisa Hernandez RN  Outcome: Progressing  Goal: Absence of cardiac dysrhythmias or at baseline  6/23/2024 1023 by Sue Cali RN  Outcome:

## 2024-06-23 NOTE — PROGRESS NOTES
[facility]  Internal Medicine Residency Program  Progress Note - House Team       Patient:  Estefani Chiu 68 y.o. female   MRN: [id number]       Date of Service: 6/23/2024    Subjective     Patient was seen and examined at bedside this morning, was sitting in bed, not in distress, on O2 @ 3L, symptomatically getting better.  No new complaints.  No significant or concerning events otherwise.    Objective       Physical Exam  Vitals: BP (!) 140/70   Pulse 64   Temp (!) 95.8 °F (35.4 °C) (Temporal)   Resp 18   Ht 1.575 m (5' 2\")   Wt 79.4 kg (175 lb)   SpO2 94%   BMI 32.01 kg/m²     I & O - 24hr: No intake/output data recorded.   General Appearance: alert, appears stated age, and cooperative  HEENT:  Head: Normal, normocephalic, atraumatic.  Lung: B/l equal air entry, minimal wheeze over b/l mid-lower lung field  Heart: s1s2  Abdomen: soft, non-tender; bowel sounds normal; no masses,  no organomegaly  Extremities:  extremities normal, atraumatic, no cyanosis or edema  Neurologic: Mental status: Alert, oriented, thought content appropriate  Diffusely scattered non painful and non discharging hyperpigmented maculopapular lesion of 2-4 mm in diameter over the upper back on the background of diffuse hyperkeratotic and hyperpigmented skin.    Diet:   ADULT DIET; Regular      Pertinent Labs & Imaging Studies     Labs    CBC with Differential:    Lab Results   Component Value Date/Time    WBC 13.5 06/22/2024 04:09 AM    RBC 4.66 06/22/2024 04:09 AM    HGB 14.1 06/22/2024 04:09 AM    HCT 43.4 06/22/2024 04:09 AM     06/22/2024 04:09 AM    MCV 93.1 06/22/2024 04:09 AM    MCH 30.3 06/22/2024 04:09 AM    MCHC 32.5 06/22/2024 04:09 AM    RDW 14.5 06/22/2024 04:09 AM    LYMPHOPCT 24 06/22/2024 04:09 AM    MONOPCT 6 06/22/2024 04:09 AM    EOSPCT 0 06/22/2024 04:09 AM    BASOPCT 0 06/22/2024 04:09 AM    MONOSABS 0.76 06/22/2024 04:09 AM    LYMPHSABS 3.15 01/07/2014 11:17 AM    EOSABS 0.01  06/22/2024 04:09 AM    BASOSABS 0.03 06/22/2024 04:09 AM     Hemoglobin/Hematocrit:    Lab Results   Component Value Date/Time    HGB 14.1 06/22/2024 04:09 AM    HCT 43.4 06/22/2024 04:09 AM     CMP:    Lab Results   Component Value Date/Time     06/22/2024 04:09 AM    K 4.6 06/22/2024 04:09 AM    CL 99 06/22/2024 04:09 AM    CO2 27 06/22/2024 04:09 AM    BUN 13 06/22/2024 04:09 AM    CREATININE 0.5 06/22/2024 04:09 AM    GFRAA >60 03/24/2014 03:27 PM    LABGLOM >90 06/22/2024 04:09 AM    LABGLOM >90 04/05/2024 07:28 PM    GLUCOSE 180 06/22/2024 04:09 AM    CALCIUM 9.4 06/22/2024 04:09 AM    BILITOT 0.6 06/17/2024 09:15 AM    ALKPHOS 109 06/17/2024 09:15 AM    AST 18 06/17/2024 09:15 AM    ALT 10 06/17/2024 09:15 AM     Sodium:    Lab Results   Component Value Date/Time     06/22/2024 04:09 AM     Potassium:    Lab Results   Component Value Date/Time    K 4.6 06/22/2024 04:09 AM       Imaging Studies:    CTA PULMONARY W CONTRAST   Final Result   1.  Limited study by artifacts obscuring detail.  The cannot see   conspicuously acute central pulmonary embolus in the right or in the left   lung pulmonary artery circulations in the central pulmonary arteries of the   mediastinum.      2.  Continued pattern of discrete vague ill-defined ground-glass opacities   throughout the lung form a mosaic pattern.  Can represent form of a chronic   of L itis as observed previously.  Please correlate clinically.      3.  Stable mild mediastinal adenopathy.         XR CHEST PORTABLE   Final Result   1. Patchy bilateral perihilar and lower lobe airspace disease.         MRI CARDIAC W WO CONTRAST    (Results Pending)   CT SINUS WO CONTRAST    (Results Pending)       Resident's Assessment and Plan     Acute hypoxic respiratory insufficiency  - ?chronic bronchitis vs  asthma vs sarcoidosis   -Elevated igE  -MPO and PR3 ANCA negative  -Aspergillus galacto negative  -CT chest:-Discrete vague ill-defined groundglass opacities  throughout the lung, mosaic pattern.  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, mean gradient 12 mmHg  Leukocytosis, likely secondary to steroid use, resolving  Type 2 diabetes mellitus    Cavernous hemangioma on the right hepatic lobe, 5mm  -Flash fill hemangioma on the right side, 7mm  Hypertension   Mild CAD on imaging  Class 1 obesity  H/O tobacco abuse Smoker    Hilar and Mediastinal Adenopathy  on past imaging  Past H/O stroke   Vit D deficiency    Monitor respiratory status, wean oxygen to baseline if tolerated  On IV steroids, to continue; currently on methylprednisone 40mg q12h; change to oral before discharge as per pulmonology  EF 65-70%, LVOT obs at rest, mean gradient 12 mmHg, No Aortic stenosis  -Cardiology consulted, recommended to change antiHTN (metoprolol succi 25, d/c ed amlod and HCTZ)  -Myocardial perfusion stress test :-low risk of cardiac events, Post-stress ejection fraction is 83%, stress end diastolic and end systolic cavity size is normal.  -14 day zio at discharge  -Cardiac MRI as outpatient  -Orthostatic BP  Continue breathing treatments, brovana, pulmicort and proventil  On low-dose sliding scale  Pulmonology consulted, appreciate recommendation  -Plan EBUS and biopsy, as outpatient  -CT sinus, to follow  D/C home with pulmonology and cardiology recs      DVT prophylaxis: Lovenox  Diet:   No diet orders on file   Bowel regimen: PRN  Pain management: as needed  Code status: No Order   Disposition: Continue current treatment  Family: updated as available      Jaylene Wood MD, PGY-1   Attending physician: Dr. Velez

## 2024-06-23 NOTE — PROGRESS NOTES
[facility]  Internal Medicine Residency / House Medicine Service    Attending Physician Statement  I have discussed the case, including pertinent history and exam findings with the resident and the team.  I have seen and examined the patient and the key elements of the encounter have been performed by me.  I agree with the assessment, plan and orders as documented by the resident.      A&O  H&L clearing  Sinus Xrays noted  VS stable on new meds , metoprolol 25 mg succinate daily  Hx of LVOT  On Zyrtec and Flonase   Solumedrol continues  BS rise noted  Ruling out Wegener's after serology  Plan: Discharge planning, on O2    Remainder of medical problems as per resident note.      Leonidas Velez MD FRCP [facility]  Internal Medicine Residency Faculty

## 2024-06-23 NOTE — PROGRESS NOTES
Inpatient Cardiology Consultation      Reason for Consult:  Increased SOB (at 3 L o2 baseline), Last Echo: LVOT obstruction at rest. LVOT peak gradient at rest is 12 mmHg.     Consulting Physician: Dr. Aguila    Requesting Physician:  Dr. Wood     Date of Consultation: 6/23/2024    HISTORY OF PRESENT ILLNESS: 68 y.o. AA female, not known to any cardiology.    Presented to ED for evaluation of 4 day history of SOB, progressively worsening, and 2 day history of chest pain, as per ED note.     Interim  Stress test was fine revealing no ischemia  Report doing well and wished to be discharged today  Follow-up with cardiology in the outpatient    Past Medical History:    HTN  Chest pain  SOB  T2 DM - Metformin  1/07/2014 A1C 7.1  12/27/2023 A1C 7.0  3/06/2024 A1C 6.9  HLD - not on statin  Bursitis  Arthritis  Carpal Tunnel  Pinched nerve in elbow  Sinusitis   CVA 2009 - reports no residuals  Former     Past Surgical History:  Cholecystectomy (1990's), tubal ligation  History reviewed. No pertinent surgical history.    Medications Prior to admit:  Prior to Admission medications    Medication Sig Start Date End Date Taking? Authorizing Provider   arformoterol tartrate (BROVANA) 15 MCG/2ML NEBU Take 2 mLs by nebulization in the morning and 2 mLs in the evening. 6/23/24  Yes Quique Dior MD   budesonide (PULMICORT) 0.5 MG/2ML nebulizer suspension Take 2 mLs by nebulization in the morning and 2 mLs in the evening. 6/23/24  Yes Quique Dior MD   metoprolol succinate (TOPROL XL) 25 MG extended release tablet Take 1 tablet by mouth 2 times daily (with meals) 6/23/24  Yes Quique Dior MD   vitamin D (CHOLECALCIFEROL) 50 MCG (2000 UT) TABS tablet Take 1 tablet by mouth daily 6/23/24  Yes Quique Dior MD   predniSONE (DELTASONE) 20 MG tablet Take 3 tablets by mouth daily for 5 days 6/23/24 6/28/24 Yes Quique Dior MD   predniSONE (DELTASONE) 20 MG tablet Take 2 tablets by mouth daily for 5 days 6/29/24  AM     03/06/2024 12:07 PM    LDL 74 04/14/2014 04:22 PM     01/07/2014 11:17 AM    TRIG 128 03/06/2024 12:07 PM    TRIG 157 04/14/2014 04:22 PM    TRIG 127 01/07/2014 11:17 AM     COVID19:   No results for input(s): \"COVID19\" in the last 72 hours.      Myocardial perfusion stress test June 21, 2024    Stress Combined Conclusion: Findings suggest a low risk of cardiac events.    Stress Function: Left ventricular function post-stress is normal. Post-stress ejection fraction is 83%. The stress end diastolic cavity size is normal. The stress end systolic cavity size is normal.    Perfusion Comments: LV perfusion is normal.    Perfusion Conclusion: There is no evidence of transient ischemic dilation (TID).    ECG: Resting ECG demonstrates normal sinus rhythm.    ECG: The stress ECG was negative for ischemia.    Stress Test: A pharmacological stress test was performed using regadenoson (Lexiscan). PO caffeine given as a reversal agent.    TTE  6/19/2024: Hyperdynamic LV systolic function with a visually estimated EF of 65 - 70%. Findings consistent with mild asymmetric hypertrophy. Elevated LV filling pressure. LVOT obstruction at rest. LVOT peak gradient at rest is 12 mmHg. RV size and function is normal. AV no stenosis. AV area by continuity VTI is 2.6 cm2. AV area by peak velocity is 2.7 cm2. MV mean gradient is 2 mmHg. Mild regurgitation. No stenosis noted. MV mean gradient is 2 mmHg. MV area by continuity equation is 3.8 cm2. Mild TV regurgitation. Trace PV regurgitation.     EKG June 17, 2024 revealed sinus tachycardia 115 bpm and nonspecific ST-T wave changes with high lateral Q waves  Assessment    Hyperdynamic left ventricle  Borderline troponin leak  HLP  LVOT obstruction  FAY  Dizziness/LH  SOB  HTN  Chest pain  SOB  Acute Hypoxic respiratory failure  Chronic respiratory failure with 4 L O2 nasal,  T2 DM - Metformin  1/07/2014 A1C 7.1  12/27/2023 A1C 7.0  3/06/2024 A1C 6.9  HLD - not on  statin  Bursitis  Arthritis  Carpal Tunnel  Pinched nerve in elbow  Sinusitis   CVA 2009 - reports no residuals  Former     Plan:   Avoid tachycardia and dehydration given LVOT gradient  Continue on beta-blocker if there is no contraindication  Evaluation and management of underlying lung disease per primary and pulmonology  14-day Zio patch heart monitor to rule out arrhythmias  She will eventually benefit from cardiac MRI in the outpatient  Uptitrate beta-blocker for optimal blood pressure control and prevent fast heart rate  Given degree of total cholesterol and LDL level we will increase Lipitor to 40 mg daily  Monitor closely on telemetry  Monitor electrolyte and keep potassium at 4 magnesium at 2  Avoid dehydration  Patient is to be advised to avoid skipping meals and to maintain optimal hydration in order to improve symptoms that may be caused by LVOT obstruction.  Follow-up with cardiology in the outpatient for close monitoring      Greater than 50 minutes was spent counseling the patient, reviewing the rationale for the above recommendations and reviewing the patient's current medication list, problem list and results of all previously ordered testing.       Bradley Aguila MD  [facility] Cardiology

## 2024-06-23 NOTE — PLAN OF CARE
Problem: Discharge Planning  Goal: Discharge to home or other facility with appropriate resources  6/23/2024 1537 by Sue Cali RN  Outcome: Adequate for Discharge  6/23/2024 1023 by Sue Cali RN  Outcome: Progressing     Problem: Safety - Adult  Goal: Free from fall injury  6/23/2024 1537 by Sue Cali RN  Outcome: Adequate for Discharge  6/23/2024 1023 by Sue Cali RN  Outcome: Progressing     Problem: Chronic Conditions and Co-morbidities  Goal: Patient's chronic conditions and co-morbidity symptoms are monitored and maintained or improved  6/23/2024 1537 by Sue Cali RN  Outcome: Adequate for Discharge  6/23/2024 1023 by Sue Cali RN  Outcome: Progressing     Problem: Skin/Tissue Integrity  Goal: Absence of new skin breakdown  Description: 1.  Monitor for areas of redness and/or skin breakdown  2.  Assess vascular access sites hourly  3.  Every 4-6 hours minimum:  Change oxygen saturation probe site  4.  Every 4-6 hours:  If on nasal continuous positive airway pressure, respiratory therapy assess nares and determine need for appliance change or resting period.  6/23/2024 1537 by Sue Cali RN  Outcome: Adequate for Discharge  6/23/2024 1023 by Sue Cali RN  Outcome: Progressing     Problem: Pain  Goal: Verbalizes/displays adequate comfort level or baseline comfort level  6/23/2024 1537 by Sue Cali RN  Outcome: Adequate for Discharge  6/23/2024 1023 by Sue Cali RN  Outcome: Progressing     Problem: Respiratory - Adult  Goal: Achieves optimal ventilation and oxygenation  6/23/2024 1537 by Sue Cali RN  Outcome: Adequate for Discharge  6/23/2024 1023 by Sue Cali RN  Outcome: Progressing     Problem: Cardiovascular - Adult  Goal: Maintains optimal cardiac output and hemodynamic stability  6/23/2024 1537 by Sue Cali RN  Outcome: Adequate for Discharge  6/23/2024 1023 by Sue Cali RN  Outcome: Progressing  Goal: Absence of cardiac dysrhythmias or at

## 2024-06-23 NOTE — DISCHARGE SUMMARY
[facility]  Discharge Summary    PCP: Maxwell Garcia MD    Admit Date:6/17/2024  Discharge Date: 6/23/2024    Admission Diagnosis:   Acute hypoxic respiratory insufficiency   Primary hypertension  Type 2 diabetes mellitus    Cavernous hemangioma on the right hepatic lobe, 5mm  Flash fill hemangioma on the right side, 7mm  Hypertension   Mild CAD on imaging  Class 1 obesity  H/O tobacco abuse Smoker    Hilar and Mediastinal Adenopathy  on past imaging  Elevated IgE  Past H/O stroke   Vit D deficiency      Discharge Diagnosis:  Acute hypoxic respiratory insufficiency  Elevated IgE  Primary hypertension, well controlled  LVOTO, stable  Type 2 diabetes mellitus, stable  Cavernous hemangioma on the right hepatic lobe, stable  Flash fill hemangioma on the right side, stable  Hypertension, stable  Mild CAD, stable  Class 1 obesity, stable  H/O tobacco abuse Smoker  Hilar and Mediastinal Adenopathy, stable  Past H/O stroke, stable   Vit D deficiency, stable      Hospital Course:     Patient is a 68-year-old female who presented with chief complaint of increased shortness of breath since past week and aggravated since past 3 to 4 days.  She endorsed shortness of breath to be present throughout the day, aggravated with activities without any relieving factors, associated with cough, usually dry.  Production of not blood-tinged whitish frothy sputum occasionally.  Patient also complained of chest tightness, central/precordial region since same duration, does not complain of any pain or migration/radiation of discomfort towards any other site.  Patient was complaining of occasional lightheadedness which was present only when she stood up quickly.  Patient was complaining of sinus congestion and congested nose since past October which was addressed multiple times but did not have complete relief of her symptoms.  Patient was also complaining of occasional nasal bleed, last event was  Nuclear stress test was done, findings were suggestive of a low risk of cardiac events.    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  Patient was advised to undergo cardiac MRI and 14 day zio patch on discharge, advised to take steroid and stop after a tapering dose and to follow with cardiology and pulmonology as outpatient.   Patient was stable at the time of discharge.    Significant findings (history and exam, laboratory, radiological, pathology, other tests):   General Appearance: alert, appears stated age, and cooperative  HEENT:  Head: Normal, normocephalic, atraumatic.  Lung: clear to auscultation bilaterally  Heart: S1, S2 normal  Abdomen: soft, non-tender; bowel sounds normal; no masses,  no organomegaly  Extremities:  extremities normal, atraumatic, no cyanosis or edema  Neurologic: Mental status: Alert, oriented, thought content appropriate    Consults:  Cardiology  Pulmonology      Condition at discharge: Stable    Disposition: home    Discharge Medications:  Current Discharge Medication List        CONTINUE these medications which have NOT CHANGED    Details   fluticasone (FLONASE) 50 MCG/ACT nasal spray 1 spray by Each Nostril route daily as needed for Rhinitis      atorvastatin (LIPITOR) 20 MG tablet Take 1 tablet by mouth daily  Qty: 90 tablet, Refills: 0    Associated Diagnoses: Hyperlipidemia, unspecified hyperlipidemia type      hydroCHLOROthiazide (HYDRODIURIL) 25 MG tablet Take 0.5 tablets by mouth every morning  Qty: 30 tablet, Refills: 0    Associated Diagnoses: Primary hypertension      ibuprofen (ADVIL;MOTRIN) 800 MG tablet Take 1 tablet by mouth every 8 hours as needed for Pain  Qty: 90 tablet, Refills: 0      metFORMIN (GLUCOPHAGE) 500 MG tablet Take 1 tablet by mouth 2 times daily (with meals)  Qty: 60 tablet, Refills: 5    Associated Diagnoses: Type 2 diabetes mellitus without complication, without long-term current use of insulin (HCC)     by mouth daily  Qty: 30 tablet, Refills: 3      albuterol sulfate HFA (VENTOLIN HFA) 108 (90 Base) MCG/ACT inhaler Inhale 2 puffs into the lungs 4 times daily as needed for Wheezing  Qty: 18 g, Refills: 5      amLODIPine (NORVASC) 5 MG tablet Take 1 tablet by mouth daily  Qty: 30 tablet, Refills: 3           STOP taking these medications       Misc. Devices KIT Comments:   Reason for Stopping:         ipratropium 0.5 mg-albuterol 2.5 mg (DUONEB) 0.5-2.5 (3) MG/3ML SOLN nebulizer solution Comments:   Reason for Stopping:         Budeson-Glycopyrrol-Formoterol 160-9-4.8 MCG/ACT AERO Comments:   Reason for Stopping:         Respiratory Therapy Supplies (FULL KIT NEBULIZER SET) MISC Comments:   Reason for Stopping:             Activity: activity as tolerated  Diet: regular diet    Patient Instructions:     Follow ups  Please follow up with the internal medicine clinic at [facility].  Please f/u with pulmonology clinic and cardiology clinic    Changes in healthcare   Please take all medications as indicated  Diet: regular diet   Activity: activity as tolerated  New Medications started during this hospital stay  Prednisone  Toporol-xl  Medications you should stop taking   Amlodipine  HCTZ  Additional labs, testing or imaging needed after discharge   Zio patch, 14 day monitoring  Cardiac MRI  Please contact us if you have any concerns, wish to change or make an appointment:  Internal medicine clinic   Phone: [phone]  Fax: [phone]  [facility] [address]  Or please call the nurse line at [phone].  Should you have further questions in regards to this visit, you can review your clinical note and after visit summary document on your [employer] account.  Other questions can be directed to our nurse line at [phone].     Other than any new prescriptions given to you today, the list of home medications on this After Visit Summary are based on information provided to us from you and your

## 2024-06-24 ENCOUNTER — TELEPHONE (OUTPATIENT)
Dept: INTERNAL MEDICINE | Age: 69
End: 2024-06-24

## 2024-06-24 NOTE — TELEPHONE ENCOUNTER
Care Transitions Initial Follow Up Call    Outreach made within 2 business days of discharge: Yes    Patient: Estefani Chiu Patient : 1955   MRN: [id number]  Reason for Admission: There are no discharge diagnoses documented for the most recent discharge.  Discharge Date: 24       Spoke with: Estefani Chiu    Discharge department/facility: [facility] Interactive Patient Contact:  Was patient able to fill all prescriptions: No: Pt was only able to  the Vitamin D and Pulmicort. Called and spoke with pharmacy. States the metoprolol and Brovana had to be ordered and will be in today. The prednisone script, pharmacist had left a message regarding prednisone script and if ok to place on 1 script. Reviewed with Dr. ANNE-MARIE Rich and ok. Pharmacy notified of same. Called patient back and notified remaining medications will be available for  later this afternoon.   Was patient instructed to bring all medications to the follow-up visit: Yes  Is patient taking all medications as directed in the discharge summary? No-see above.  Does patient understand their discharge instructions: Yes  Does patient have questions or concerns that need addressed prior to 7-14 day follow up office visit: no    Scheduled appointment with PCP within 7-14 days    Follow Up  Future Appointments   Date Time Provider Department Center   2024 11:30 AM [facility] SCAN 3 [facility]   2024 10:30 AM Maxwell Garcia MD [facility]   2024  7:45 AM Bradley Aguila MD [facility]   2024  9:00 AM [facility] ECHO 1 [facility] CARDIO [facility]   2024 11:20 AM Cindy Sandoval DO [facility] Pulm [facility]       Ivanna Davis RN

## 2024-06-25 RX ORDER — CETIRIZINE HYDROCHLORIDE 10 MG/1
10 TABLET ORAL DAILY
Qty: 30 TABLET | Refills: 3 | Status: SHIPPED | OUTPATIENT
Start: 2024-06-25

## 2024-07-01 ENCOUNTER — OFFICE VISIT (OUTPATIENT)
Dept: INTERNAL MEDICINE | Age: 69
End: 2024-07-01
Payer: MEDICARE

## 2024-07-01 VITALS
HEIGHT: 62 IN | WEIGHT: 184 LBS | SYSTOLIC BLOOD PRESSURE: 153 MMHG | BODY MASS INDEX: 33.86 KG/M2 | RESPIRATION RATE: 20 BRPM | HEART RATE: 76 BPM | OXYGEN SATURATION: 95 % | DIASTOLIC BLOOD PRESSURE: 76 MMHG | TEMPERATURE: 97.8 F

## 2024-07-01 DIAGNOSIS — J96.01 ACUTE RESPIRATORY FAILURE WITH HYPOXIA (HCC): Primary | ICD-10-CM

## 2024-07-01 DIAGNOSIS — E78.5 HYPERLIPIDEMIA, UNSPECIFIED HYPERLIPIDEMIA TYPE: ICD-10-CM

## 2024-07-01 DIAGNOSIS — E11.9 TYPE 2 DIABETES MELLITUS WITHOUT COMPLICATION, WITHOUT LONG-TERM CURRENT USE OF INSULIN (HCC): ICD-10-CM

## 2024-07-01 DIAGNOSIS — Z09 HOSPITAL DISCHARGE FOLLOW-UP: ICD-10-CM

## 2024-07-01 PROCEDURE — G8427 DOCREV CUR MEDS BY ELIG CLIN: HCPCS

## 2024-07-01 PROCEDURE — 99213 OFFICE O/P EST LOW 20 MIN: CPT

## 2024-07-01 PROCEDURE — 99212 OFFICE O/P EST SF 10 MIN: CPT

## 2024-07-01 PROCEDURE — 1111F DSCHRG MED/CURRENT MED MERGE: CPT

## 2024-07-01 PROCEDURE — 3044F HG A1C LEVEL LT 7.0%: CPT

## 2024-07-01 PROCEDURE — G8400 PT W/DXA NO RESULTS DOC: HCPCS

## 2024-07-01 PROCEDURE — 3077F SYST BP >= 140 MM HG: CPT

## 2024-07-01 PROCEDURE — G8417 CALC BMI ABV UP PARAM F/U: HCPCS

## 2024-07-01 PROCEDURE — 3017F COLORECTAL CA SCREEN DOC REV: CPT

## 2024-07-01 PROCEDURE — 1123F ACP DISCUSS/DSCN MKR DOCD: CPT

## 2024-07-01 PROCEDURE — 1036F TOBACCO NON-USER: CPT

## 2024-07-01 PROCEDURE — 3078F DIAST BP <80 MM HG: CPT

## 2024-07-01 PROCEDURE — 1090F PRES/ABSN URINE INCON ASSESS: CPT

## 2024-07-01 PROCEDURE — 2022F DILAT RTA XM EVC RTNOPTHY: CPT

## 2024-07-01 RX ORDER — ALBUTEROL SULFATE 90 UG/1
2 AEROSOL, METERED RESPIRATORY (INHALATION) 4 TIMES DAILY PRN
Qty: 18 G | Refills: 5 | Status: SHIPPED | OUTPATIENT
Start: 2024-07-01

## 2024-07-01 RX ORDER — ATORVASTATIN CALCIUM 40 MG/1
40 TABLET, FILM COATED ORAL DAILY
Qty: 90 TABLET | Refills: 0 | Status: SHIPPED | OUTPATIENT
Start: 2024-07-01

## 2024-07-01 ASSESSMENT — ENCOUNTER SYMPTOMS
ABDOMINAL PAIN: 0
TROUBLE SWALLOWING: 0
SINUS PRESSURE: 0
VOMITING: 0
COUGH: 0
RHINORRHEA: 0
CHEST TIGHTNESS: 0
SHORTNESS OF BREATH: 0
SORE THROAT: 0
NAUSEA: 0

## 2024-07-01 NOTE — PROGRESS NOTES
Physician Progress Note      PATIENT:               FELICIA CONROY  [facility] #:                  [id number]  :                       1955  ADMIT DATE:       2024 8:41 AM  DISCH DATE:        2024 5:06 PM  RESPONDING  PROVIDER #:        Sadiq Clark MD          QUERY TEXT:    Dear ,    Pt admitted with acute on chronic respiratory failure with unknown etiology.    Noted documentation of \"Likely underlying IgE mediated asthma\" on  pn by   ordered pulmonary consultant.  If possible, please document in progress notes   and discharge summary the likely etiology of the acute respiratory failure:    The medical record reflects the following:  Risk Factors: Obesity, Chronic respiratory failure, LVOT obstruction  Clinical Indicators: Elevated igE; -CT chest:-Discrete vague ill-defined   groundglass opacities throughout the lung, mosaic pattern.  Treatment: Pulmonary and Cardiology consults, stress test, IV abx x 2 days   then stopped on , IV Solu Medrol, home on oral steroids, supplemental   oxygen, CTA chest, breathing treatments, brovana, pulmicort and proventil,   Plan EBUS and biopsy, as outpatient    Thank You,  Joan Medley RN, BSN, CDS  Clinical Documentation Improvement Specialist  [phone]  Options provided:  -- Exacerbation of asthma.  -- Sarcoidosis  -- Chronic bronchitis.  -- Other - I will add my own diagnosis  -- Disagree - Not applicable / Not valid  -- Disagree - Clinically unable to determine / Unknown  -- Refer to Clinical Documentation Reviewer    PROVIDER RESPONSE TEXT:    The diagnosis of sarcoidosis ruled in.    Query created by: Joan Medley on 2024 12:27 PM      Electronically signed by:  Sadiq Clark MD 2024 5:46 PM

## 2024-07-01 NOTE — PATIENT INSTRUCTIONS
Thank you for coming to your follow up appointment   Please take your medications as directed and keep your follow up appointment in 3 months.  Call our office if you have any questions or concerns at [phone]  Follow up with cardiology and Pulmonology as scheduled.   Have your MRI done prior to the next visit.     Maxwell Garcia MD

## 2024-07-01 NOTE — PROGRESS NOTES
[facility]  Internal Medicine Residency Clinic    Attending Physician Statement  I have discussed the case, including pertinent history and exam findings with the resident physician.  I agree with the assessment, plan and orders as documented by the resident. I have reviewed all pertinent PMHx, PSHx, FamHx, SocialHx, medications, and allergies and updated history as appropriate.    Patient here for routine follow up of medical problems. And HFU    IDDM2  -improved; continue current regimen   -screening labs and testing     Acute Asthma Exacerbation  -controlled; continue current respiratory regimen   -on LAMA and ICS  -oral corticosteroid  -patient to have EBUS to evaluate for sarcoidosis   -symptoms from hospitalization have resolved     Remainder of medical problems as per resident note.    Vernon Amador Jr, DO  7/1/24    
  ibuprofen 800 MG tablet  Commonly known as: ADVIL;MOTRIN  Take 1 tablet by mouth every 8 hours as needed for Pain     metFORMIN 500 MG tablet  Commonly known as: GLUCOPHAGE  Take 1 tablet by mouth 2 times daily (with meals)     metoprolol succinate 25 MG extended release tablet  Commonly known as: TOPROL XL  Take 1 tablet by mouth 2 times daily (with meals)     * predniSONE 20 MG tablet  Commonly known as: DELTASONE  Take 2 tablets by mouth daily for 5 days     * predniSONE 20 MG tablet  Commonly known as: DELTASONE  Take 1 tablet by mouth daily for 5 days  Start taking on: July 5, 2024     * predniSONE 10 MG tablet  Commonly known as: DELTASONE  Take 1 tablet by mouth daily for 3 days  Start taking on: July 11, 2024     * predniSONE 1 MG tablet  Commonly known as: DELTASONE  Take 5 tablets by mouth daily for 2 days  Start taking on: July 15, 2024     vitamin D 50 MCG (2000 UT) Tabs tablet  Commonly known as: CHOLECALCIFEROL  Take 1 tablet by mouth daily           * This list has 4 medication(s) that are the same as other medications prescribed for you. Read the directions carefully, and ask your doctor or other care provider to review them with you.                   Medications marked \"taking\" at this time  Outpatient Medications Marked as Taking for the 7/1/24 encounter (Office Visit) with Maxwell Garcia MD   Medication Sig Dispense Refill    cetirizine (ZYRTEC) 10 MG tablet TAKE 1 TABLET BY MOUTH EVERY DAY 30 tablet 3    arformoterol tartrate (BROVANA) 15 MCG/2ML NEBU Take 2 mLs by nebulization in the morning and 2 mLs in the evening. 120 mL 1    budesonide (PULMICORT) 0.5 MG/2ML nebulizer suspension Take 2 mLs by nebulization in the morning and 2 mLs in the evening. 60 each 1    metoprolol succinate (TOPROL XL) 25 MG extended release tablet Take 1 tablet by mouth 2 times daily (with meals) 90 tablet 1    vitamin D (CHOLECALCIFEROL) 50 MCG (2000 UT) TABS tablet Take 1 tablet by mouth daily 90 tablet 1

## 2024-07-21 NOTE — PROGRESS NOTES
Physician Progress Note      PATIENT:               FELICIA CONROY  [facility] #:                  [id number]  :                       1955  ADMIT DATE:       2024 8:41 AM  DISCH DATE:        2024 5:06 PM  RESPONDING  PROVIDER #:        Sadiq Clark MD          QUERY TEXT:    Patient admitted with sarcoidosis. Noted documentation of acute hypoxic   respiratory failure in Pulmonology progress note. The discharge summary   reflects acute hypoxic respiratory insufficiency. In order to support the   diagnosis of acute hypoxic respiratory failure, please include additional   clinical indicators in your documentation.  Or please document if the   diagnosis of acute hypoxic respiratory failure has been ruled out after   further study and the patient has acute hypoxic respiratory insufficiency.    The medical record reflects the following:  Risk Factors:  Sarcoidosis, asthma, chronic bronchitis, obesity, restrictive   lung disease, history of smoking.  Clinical Indicators: Presented to ED with SOB and hypoxic on 4L NC with SPO2   of 85%. Increased to 5L NC with SPO@ range of 91 to 94%. Nonproductive cough.   SOB worsens with activities. H&P notes, \"Lung: B/L decreased air entry with   diffuse mid to end expiratory wheeze.\"  Treatment: Pulmonology consult. Oxygen 5L NC, solu-medrol 40 mg IV BID,   albuterol neds, bronchodilators, PEP/flutter.    Acute Respiratory Failure Clinical Indicators per 3[employer] MS-DRG Training Guide and   Quick Reference Guide:  pO2 < 60 mmHg or SpO2 (pulse oximetry) < 91% breathing room air  pCO2 > 50 and pH < 7.35  P/F ratio (pO2 / FIO2) < 300  pO2 decrease or pCO2 increase by 10 mmHg from baseline (if known)  Supplemental oxygen of 40% or more  Presence of respiratory distress, tachypnea, dyspnea, shortness of breath,   wheezing  Unable to speak in complete sentences  Use of accessory muscles to breathe  Extreme anxiety and feeling of impending doom  Tripod

## 2024-07-25 ENCOUNTER — OFFICE VISIT (OUTPATIENT)
Dept: CARDIOLOGY CLINIC | Age: 69
End: 2024-07-25
Payer: MEDICARE

## 2024-07-25 VITALS
BODY MASS INDEX: 34.66 KG/M2 | SYSTOLIC BLOOD PRESSURE: 160 MMHG | DIASTOLIC BLOOD PRESSURE: 88 MMHG | HEIGHT: 61 IN | HEART RATE: 71 BPM | WEIGHT: 183.6 LBS | RESPIRATION RATE: 18 BRPM

## 2024-07-25 DIAGNOSIS — R59.0 LYMPHADENOPATHY, MEDIASTINAL: ICD-10-CM

## 2024-07-25 DIAGNOSIS — E66.09 CLASS 1 OBESITY DUE TO EXCESS CALORIES WITH SERIOUS COMORBIDITY AND BODY MASS INDEX (BMI) OF 32.0 TO 32.9 IN ADULT: ICD-10-CM

## 2024-07-25 DIAGNOSIS — R76.8 ELEVATED IGE LEVEL: ICD-10-CM

## 2024-07-25 DIAGNOSIS — J96.01 ACUTE RESPIRATORY FAILURE WITH HYPOXIA (HCC): ICD-10-CM

## 2024-07-25 DIAGNOSIS — Q24.8 LEFT VENTRICULAR OUTFLOW TRACT OBSTRUCTION: ICD-10-CM

## 2024-07-25 DIAGNOSIS — R59.0 LYMPHADENOPATHY, HILAR: ICD-10-CM

## 2024-07-25 DIAGNOSIS — E11.9 TYPE 2 DIABETES MELLITUS WITHOUT COMPLICATION, WITHOUT LONG-TERM CURRENT USE OF INSULIN (HCC): ICD-10-CM

## 2024-07-25 DIAGNOSIS — I10 PRIMARY HYPERTENSION: Primary | ICD-10-CM

## 2024-07-25 DIAGNOSIS — R06.83 SNORES: ICD-10-CM

## 2024-07-25 DIAGNOSIS — J41.8 MIXED SIMPLE AND MUCOPURULENT CHRONIC BRONCHITIS (HCC): ICD-10-CM

## 2024-07-25 DIAGNOSIS — R29.818 SUSPECTED SLEEP APNEA: ICD-10-CM

## 2024-07-25 PROCEDURE — 2022F DILAT RTA XM EVC RTNOPTHY: CPT | Performed by: INTERNAL MEDICINE

## 2024-07-25 PROCEDURE — 1036F TOBACCO NON-USER: CPT | Performed by: INTERNAL MEDICINE

## 2024-07-25 PROCEDURE — 93000 ELECTROCARDIOGRAM COMPLETE: CPT | Performed by: INTERNAL MEDICINE

## 2024-07-25 PROCEDURE — G8427 DOCREV CUR MEDS BY ELIG CLIN: HCPCS | Performed by: INTERNAL MEDICINE

## 2024-07-25 PROCEDURE — G8417 CALC BMI ABV UP PARAM F/U: HCPCS | Performed by: INTERNAL MEDICINE

## 2024-07-25 PROCEDURE — 3077F SYST BP >= 140 MM HG: CPT | Performed by: INTERNAL MEDICINE

## 2024-07-25 PROCEDURE — 3079F DIAST BP 80-89 MM HG: CPT | Performed by: INTERNAL MEDICINE

## 2024-07-25 PROCEDURE — 1090F PRES/ABSN URINE INCON ASSESS: CPT | Performed by: INTERNAL MEDICINE

## 2024-07-25 PROCEDURE — G8400 PT W/DXA NO RESULTS DOC: HCPCS | Performed by: INTERNAL MEDICINE

## 2024-07-25 PROCEDURE — 3017F COLORECTAL CA SCREEN DOC REV: CPT | Performed by: INTERNAL MEDICINE

## 2024-07-25 PROCEDURE — 99214 OFFICE O/P EST MOD 30 MIN: CPT | Performed by: INTERNAL MEDICINE

## 2024-07-25 PROCEDURE — 3023F SPIROM DOC REV: CPT | Performed by: INTERNAL MEDICINE

## 2024-07-25 PROCEDURE — 3044F HG A1C LEVEL LT 7.0%: CPT | Performed by: INTERNAL MEDICINE

## 2024-07-25 PROCEDURE — 1123F ACP DISCUSS/DSCN MKR DOCD: CPT | Performed by: INTERNAL MEDICINE

## 2024-07-25 RX ORDER — CARVEDILOL 6.25 MG/1
6.25 TABLET ORAL 2 TIMES DAILY WITH MEALS
Qty: 180 TABLET | Refills: 3 | Status: SHIPPED | OUTPATIENT
Start: 2024-07-25

## 2024-07-25 NOTE — PROGRESS NOTES
tablet Take 1 tablet by mouth every 8 hours as needed for Pain 90 tablet 0     No current facility-administered medications for this visit.             Review of Systems:   Cardiac: As per HPI  General: Denies fever or chills  Pulmonary: As per HPI  HEENT: Denies runny nose  GI: No complaints  : No complaints  Endocrine: Denies night sweats  Musculoskeletal: No complaints  Skin: Dry skin  Neuro: No complaints  Psych: Denies depression    Physical Exam:  BP (!) 160/88 (Site: Right Upper Arm, Position: Supine)   Pulse 71   Resp 18   Ht 1.549 m (5' 1\")   Wt 83.3 kg (183 lb 9.6 oz)   BMI 34.69 kg/m²   Wt Readings from Last 3 Encounters:   07/25/24 83.3 kg (183 lb 9.6 oz)   07/01/24 83.5 kg (184 lb)   06/21/24 79.4 kg (175 lb)       Appearance: Alert and oriented x3 not in acute distress.  Skin: Dry skin  Head: Atraumatic  Eyes: Intact extraocular muscles   ENMT: Mucous membranes are moist  Neck: Supple  Lungs: Clear to auscultation  Cardiac: Normal S1 and S2  Abdomen: Protuberant  Extremities: Intact range of motion  Neurologic: No focal neurological deficits  Peripheral Pulses: 2+ peripheral pulses    Intake/Output:  No intake or output data in the 24 hours ending 07/26/24 0701  [unfilled]    Laboratory Tests:  No results for input(s): \"NA\", \"K\", \"CL\", \"CO2\", \"BUN\", \"CREATININE\", \"GLUCOSE\", \"CALCIUM\" in the last 72 hours.  Lab Results   Component Value Date/Time    MG 2.4 06/22/2024 04:09 AM    MG 2.3 06/21/2024 04:22 AM    MG 2.2 06/20/2024 05:53 AM     No results for input(s): \"ALKPHOS\", \"ALT\", \"AST\", \"BILITOT\", \"BILIDIR\", \"LABALBU\" in the last 72 hours.    Invalid input(s): \"PROT\"  No results for input(s): \"WBC\", \"RBC\", \"HGB\", \"HCT\", \"MCV\", \"MCH\", \"MCHC\", \"RDW\", \"PLT\", \"MPV\" in the last 72 hours.  No results found for: \"CKTOTAL\", \"CKMB\", \"CKMBINDEX\", \"TROPONINI\"  No results for input(s): \"CKTOTAL\", \"CKMB\", \"CKMBINDEX\", \"TROPHS\" in the last 72 hours.  Lab Results   Component Value Date    INR 1.2 06/17/2024

## 2024-07-26 DIAGNOSIS — R06.09 DYSPNEA ON EXERTION: ICD-10-CM

## 2024-07-26 DIAGNOSIS — R06.01 ORTHOPNEA: ICD-10-CM

## 2024-08-09 ENCOUNTER — TELEPHONE (OUTPATIENT)
Dept: CARDIOLOGY CLINIC | Age: 69
End: 2024-08-09

## 2024-08-09 NOTE — TELEPHONE ENCOUNTER
Patient needs clearance for cataract surgery on 8/14/24 and 9/5/24 under LMAC & ASC to be done at [facility] for [facility].     Patient denies any chest pain, shortness of breath or palpitations since last visit in 7/26/24.  Patient is able to complete all activities of daily living, including; climbing one flight of stairs and walking two blocks without cardiac symptoms. Please advise.

## 2024-10-04 ENCOUNTER — OFFICE VISIT (OUTPATIENT)
Dept: INTERNAL MEDICINE | Age: 69
End: 2024-10-04
Payer: MEDICARE

## 2024-10-04 VITALS
HEIGHT: 62 IN | DIASTOLIC BLOOD PRESSURE: 102 MMHG | TEMPERATURE: 97.2 F | RESPIRATION RATE: 18 BRPM | BODY MASS INDEX: 32.85 KG/M2 | HEART RATE: 71 BPM | OXYGEN SATURATION: 96 % | WEIGHT: 178.5 LBS | SYSTOLIC BLOOD PRESSURE: 211 MMHG

## 2024-10-04 DIAGNOSIS — E78.5 HYPERLIPIDEMIA, UNSPECIFIED HYPERLIPIDEMIA TYPE: ICD-10-CM

## 2024-10-04 DIAGNOSIS — E55.9 VITAMIN D DEFICIENCY: ICD-10-CM

## 2024-10-04 DIAGNOSIS — E11.9 TYPE 2 DIABETES MELLITUS WITHOUT COMPLICATION, WITHOUT LONG-TERM CURRENT USE OF INSULIN (HCC): ICD-10-CM

## 2024-10-04 DIAGNOSIS — Z13.9 SCREENING DUE: Primary | ICD-10-CM

## 2024-10-04 DIAGNOSIS — I10 HYPERTENSION, UNSPECIFIED TYPE: ICD-10-CM

## 2024-10-04 DIAGNOSIS — J44.9 COPD (CHRONIC OBSTRUCTIVE PULMONARY DISEASE) CASE MANAGEMENT PATIENT (HCC): ICD-10-CM

## 2024-10-04 PROCEDURE — 1090F PRES/ABSN URINE INCON ASSESS: CPT

## 2024-10-04 PROCEDURE — 99212 OFFICE O/P EST SF 10 MIN: CPT

## 2024-10-04 PROCEDURE — 3077F SYST BP >= 140 MM HG: CPT

## 2024-10-04 PROCEDURE — G8427 DOCREV CUR MEDS BY ELIG CLIN: HCPCS

## 2024-10-04 PROCEDURE — 3017F COLORECTAL CA SCREEN DOC REV: CPT

## 2024-10-04 PROCEDURE — 3080F DIAST BP >= 90 MM HG: CPT

## 2024-10-04 PROCEDURE — G8400 PT W/DXA NO RESULTS DOC: HCPCS

## 2024-10-04 PROCEDURE — 1123F ACP DISCUSS/DSCN MKR DOCD: CPT

## 2024-10-04 PROCEDURE — 2022F DILAT RTA XM EVC RTNOPTHY: CPT

## 2024-10-04 PROCEDURE — G8484 FLU IMMUNIZE NO ADMIN: HCPCS

## 2024-10-04 PROCEDURE — 3023F SPIROM DOC REV: CPT

## 2024-10-04 PROCEDURE — 1036F TOBACCO NON-USER: CPT

## 2024-10-04 PROCEDURE — 99213 OFFICE O/P EST LOW 20 MIN: CPT

## 2024-10-04 PROCEDURE — G8417 CALC BMI ABV UP PARAM F/U: HCPCS

## 2024-10-04 PROCEDURE — 3044F HG A1C LEVEL LT 7.0%: CPT

## 2024-10-04 RX ORDER — CHOLECALCIFEROL (VITAMIN D3) 50 MCG
2000 TABLET ORAL DAILY
Qty: 90 TABLET | Refills: 1 | Status: SHIPPED | OUTPATIENT
Start: 2024-10-04

## 2024-10-04 RX ORDER — ATORVASTATIN CALCIUM 40 MG/1
40 TABLET, FILM COATED ORAL DAILY
Qty: 90 TABLET | Refills: 1 | Status: SHIPPED | OUTPATIENT
Start: 2024-10-04

## 2024-10-04 RX ORDER — ALBUTEROL SULFATE 90 UG/1
2 INHALANT RESPIRATORY (INHALATION) 4 TIMES DAILY PRN
Qty: 18 G | Refills: 5 | Status: SHIPPED | OUTPATIENT
Start: 2024-10-04

## 2024-10-04 RX ORDER — BUDESONIDE 0.5 MG/2ML
INHALANT ORAL
Qty: 360 ML | OUTPATIENT
Start: 2024-10-04

## 2024-10-04 RX ORDER — CETIRIZINE HYDROCHLORIDE 10 MG/1
10 TABLET ORAL DAILY
Qty: 30 TABLET | Refills: 3 | Status: CANCELLED | OUTPATIENT
Start: 2024-10-04

## 2024-10-04 RX ORDER — CARVEDILOL 6.25 MG/1
6.25 TABLET ORAL 2 TIMES DAILY WITH MEALS
Qty: 180 TABLET | Refills: 1 | Status: SHIPPED | OUTPATIENT
Start: 2024-10-04

## 2024-10-04 RX ORDER — ARFORMOTEROL TARTRATE 15 UG/2ML
15 SOLUTION RESPIRATORY (INHALATION)
Qty: 120 ML | Refills: 1 | Status: SHIPPED | OUTPATIENT
Start: 2024-10-04

## 2024-10-04 RX ORDER — CLONIDINE HYDROCHLORIDE 0.1 MG/1
0.1 TABLET ORAL ONCE
Status: COMPLETED | OUTPATIENT
Start: 2024-10-04 | End: 2024-10-04

## 2024-10-04 RX ORDER — BUDESONIDE 0.5 MG/2ML
0.5 INHALANT ORAL
Qty: 60 EACH | Refills: 1 | Status: SHIPPED | OUTPATIENT
Start: 2024-10-04

## 2024-10-04 RX ORDER — AZELASTINE 1 MG/ML
1 SPRAY, METERED NASAL 2 TIMES DAILY
Qty: 60 ML | Refills: 1 | Status: SHIPPED | OUTPATIENT
Start: 2024-10-04

## 2024-10-04 RX ORDER — IBUPROFEN 800 MG/1
800 TABLET, FILM COATED ORAL EVERY 8 HOURS PRN
Qty: 90 TABLET | Refills: 0 | Status: SHIPPED | OUTPATIENT
Start: 2024-10-04

## 2024-10-04 RX ORDER — LOSARTAN POTASSIUM 50 MG/1
50 TABLET ORAL DAILY
Qty: 90 TABLET | Refills: 1 | Status: SHIPPED
Start: 2024-10-04 | End: 2024-10-16

## 2024-10-04 RX ADMIN — CLONIDINE HYDROCHLORIDE 0.1 MG: 0.1 TABLET ORAL at 09:11

## 2024-10-04 NOTE — PROGRESS NOTES
[facility]  Internal Medicine Residency Clinic    Attending Physician Statement  I have discussed the case, including pertinent history and exam findings with the resident physician.  I agree with the assessment, plan and orders as documented by the resident. I have reviewed all pertinent PMHx, PSHx, FamHx, SocialHx, medications, and allergies and updated history as appropriate.    Patient here for routine follow up of medical problems.     HTN Urgency   -patinet taking coreg 6.25 mg BID  -plan to start patient on losartan 50 mg today and then have patient come back next week for additional titration of medications   -no headache, chest pain, vision loss, sob     LVOT Obstruction  -for cardiac MRI; concerns for sarcoidosis as cause   -following with cardiology   -for further evaluation by them after testing     NIDDM2  -A1c controlled at 6.3   -elevated blood glucose in the chart was 2/2 steroids at last visit for her IgE syndrome   -screening testing ordered     HLD  -rechecking blood work and lab tsting   -titrate statin as needed     Hyper IgE Syndrome   -continue inhalers   -patient to have evalutaion for sarcoidosis with Dr. Sandoval; 2/2 mediastinal lymphadenopathy   -appreciate w/u by pulmonology for hyper IgE syndrome     Remainder of medical problems as per resident note.    Vernon Amador Jr, DO  10/4/24

## 2024-10-04 NOTE — PROGRESS NOTES
[facility] Physicians [facility] Internal Medicine      SUBJECTIVE:  Estefani Chiu (:  1955) is a 69 y.o. female here for evaluation of the following chief complaint(s):  No chief complaint on file.    HPI:     Patient is a 69-year-old female who comes here for regular follow-up.  Patient's last office visit was on 2024.  Patient does not have any symptoms at present but says she gets shortness of breath after walking short distances.  No history of headache, chest pain, palpitation, orthopnea or PND.  Patient does complain of bendopnea.  Patient's blood pressure was 211/98 today at first, repeat blood pressure after 15 minutes was 211/102.  0.1 mg of Catapres was given in the clinic, blood pressure dropped to 178/79 after 30 minutes.    Patient does complain of nasal congestion, which has been her chronic issues, without any fever or rhinorrhea.  Patient follows with cardiology for LVOT obstruction, MRI was ordered and is pending.  Patient last echo on 2024 showed ejection fraction of 65-70% with LVOT obstruction at rest.  Patient's beta-blocker was changed from metoprolol to carvedilol 6.25 mg and is planned for sleep study.    Patient has a history of hypertension, for which she is currently only on carvedilol 6.25 mg.  Says,       \" her highest recorded blood pressure at home is 167/82 and usually is like 135/76.\"  Plan from cardiology was 3 months home blood pressure record to guide up titration of beta-blocker.  Patient will be started on 50 mg of losartan at this visit.    Patient's medical history is also significant for IVIG syndrome, IgE mediated asthma/restrictive pattern spirometry and was concerned of sarcoidosis(normal ACE level) for which she he is supposed to follow with pulmonology, next appointment is with Dr. Sandoval on .    Patient is a former smoker and discontinued on 2024.  Patient's recent A1c is 6.3 and is continued on metformin 500 mg twice

## 2024-10-09 ENCOUNTER — OFFICE VISIT (OUTPATIENT)
Dept: INTERNAL MEDICINE | Age: 69
End: 2024-10-09
Payer: MEDICARE

## 2024-10-09 VITALS
OXYGEN SATURATION: 95 % | RESPIRATION RATE: 16 BRPM | DIASTOLIC BLOOD PRESSURE: 82 MMHG | WEIGHT: 178.9 LBS | HEIGHT: 62 IN | SYSTOLIC BLOOD PRESSURE: 154 MMHG | BODY MASS INDEX: 32.92 KG/M2 | TEMPERATURE: 97.6 F | HEART RATE: 60 BPM

## 2024-10-09 DIAGNOSIS — E78.5 HYPERLIPIDEMIA, UNSPECIFIED HYPERLIPIDEMIA TYPE: ICD-10-CM

## 2024-10-09 DIAGNOSIS — Z28.21 FLU VACCINE REFUSED: ICD-10-CM

## 2024-10-09 DIAGNOSIS — J44.9 COPD (CHRONIC OBSTRUCTIVE PULMONARY DISEASE) CASE MANAGEMENT PATIENT (HCC): ICD-10-CM

## 2024-10-09 DIAGNOSIS — Z13.820 OSTEOPOROSIS SCREENING: ICD-10-CM

## 2024-10-09 DIAGNOSIS — J43.9 PULMONARY EMPHYSEMA, UNSPECIFIED EMPHYSEMA TYPE (HCC): ICD-10-CM

## 2024-10-09 DIAGNOSIS — E55.9 VITAMIN D DEFICIENCY: ICD-10-CM

## 2024-10-09 DIAGNOSIS — I10 PRIMARY HYPERTENSION: ICD-10-CM

## 2024-10-09 DIAGNOSIS — E11.9 TYPE 2 DIABETES MELLITUS WITHOUT COMPLICATION, WITHOUT LONG-TERM CURRENT USE OF INSULIN (HCC): Primary | ICD-10-CM

## 2024-10-09 LAB — HBA1C MFR BLD: 6.3 %

## 2024-10-09 PROCEDURE — 83036 HEMOGLOBIN GLYCOSYLATED A1C: CPT | Performed by: STUDENT IN AN ORGANIZED HEALTH CARE EDUCATION/TRAINING PROGRAM

## 2024-10-09 PROCEDURE — 99212 OFFICE O/P EST SF 10 MIN: CPT | Performed by: STUDENT IN AN ORGANIZED HEALTH CARE EDUCATION/TRAINING PROGRAM

## 2024-10-09 RX ORDER — ASPIRIN 81 MG/1
81 TABLET ORAL DAILY
Qty: 90 TABLET | Refills: 0 | Status: SHIPPED | OUTPATIENT
Start: 2024-10-09

## 2024-10-09 ASSESSMENT — ENCOUNTER SYMPTOMS
SHORTNESS OF BREATH: 0
VOMITING: 0
ABDOMINAL PAIN: 0
RHINORRHEA: 0
NAUSEA: 0
COUGH: 0
CONSTIPATION: 0
WHEEZING: 0
BLOOD IN STOOL: 0
DIARRHEA: 0
BACK PAIN: 0

## 2024-10-09 NOTE — PATIENT INSTRUCTIONS
[facility] Internal Medicine Resident Service    Activity as tolerated  Diet: low fat, low sodium, and diabetic  Be compliant with your medications and take them as prescribed.    Special Instructions:   Please start taking losartan 50 mg once a day.   Please check your blood pressure daily and write it down. We will follow up with you next week with a phone call visit to see how your blood pressure is responding to your new blood pressure medication.   Please have your blood work and urine study done on Monday October 14, 2024.   We ordered a bone scan to screen for osteoporosis. The lab will call you to schedule an appointment.     Hospital Follow up: [facility] with House Team   Call to confirm appointment Tel: [phone] ([facility] Internal Medicine Clinic)     Other Follow-Ups:    Future Appointments   Date Time Provider Department Center   11/4/2024  8:30 AM Bradley Aguila MD [place] Card [facility]   11/5/2024 10:45 AM Puma Aragon MD [place] SLEEP [facility]   11/21/2024  9:20 AM Cindy Sandoval,  [facility] Pulm [facility]       Other than any new prescriptions given to you today, the list of home going meds on this After Visit Summary are based on information provided to us from you. This information, including the list, dose, and frequency of medications is only as accurate as the information you provided. If you have any questions or concerns about your home medications, please contact your Primary Care Physician for further clarification.      Allison Pedro MD PGY-3  10/9/2024  9:43 AM

## 2024-10-09 NOTE — PROGRESS NOTES
[facility]  Internal Medicine Residency Clinic    Attending Physician Statement  I have discussed the case, including pertinent history and exam findings with the resident physician.  I agree with the assessment, plan and orders as documented by the resident. I have reviewed all pertinent PMHx, PSHx, FamHx, SocialHx, medications, and allergies and updated history as appropriate.    Patient here for routine follow up of medical problems.  Patient mainly here for BP follow up. She was supposed to be taking losartan which was started last visit but apparently it was not ready at the pharmacy.  Patient instructed to start taking it as soon as she gets it and to continue checking BP at home and adjust as needed.  Has DM, on metformin, controlled with HBA1c 6.3%. Patient has hyperlipidemia, on statin, needs repeat lipid panel, may need increase in statin dose pending results. Check BMP after starting ARB.      Remainder of medical problems as per resident note.    Reginaldo Monreal, DO  10/9/24    
Future    Lipid Panel; Future    Flu vaccine refused     Osteoporosis screening  3/6/24 Vit D 25OH 9.7 low   Vit D 2,000 u daily   Age 69   Orders:    DEXA BONE DENSITY AXIAL SKELETON; Future      Return in about 1 week (around 10/16/2024) for phone call visit for htn .       Jakob Jara comes in for a 1 week follow up for uncontrolled htn.     Her bp last week during her clinic visit was 211/98, repeat 211/102. She was taking coreg 6.25 mg bid prior to that visit. losartan 50 mg was started during her last visit.     Today she reports she has not been able to start taking losartan yet because her pharmacy didn't have it in stock. She will pick it up today from the pharmacy.     She will need to have a phone call visit next week for follow up for blood pressure control after starting losartan.           Review of Systems   Constitutional:  Negative for chills and fever.   HENT:  Negative for rhinorrhea.    Respiratory:  Negative for cough, shortness of breath and wheezing.    Cardiovascular:  Negative for chest pain, palpitations and leg swelling.   Gastrointestinal:  Negative for abdominal pain, blood in stool, constipation, diarrhea, nausea and vomiting.   Genitourinary:  Negative for difficulty urinating, dysuria and hematuria.   Musculoskeletal:  Negative for arthralgias, back pain, gait problem and joint swelling.   Neurological:  Negative for dizziness, syncope, light-headedness and headaches.          Objective   Physical Exam  Vitals reviewed.   Constitutional:       General: She is not in acute distress.     Appearance: Normal appearance. She is obese. She is not ill-appearing, toxic-appearing or diaphoretic.   HENT:      Head: Normocephalic and atraumatic.   Eyes:      General: No scleral icterus.     Extraocular Movements: Extraocular movements intact.      Conjunctiva/sclera: Conjunctivae normal.   Neck:      Vascular: No carotid bruit.   Cardiovascular:      Rate and Rhythm: Normal rate and

## 2024-10-09 NOTE — ASSESSMENT & PLAN NOTE
bp last week during her clinic visit was 211/98, repeat 211/102  BP during visit today 190/86 right arm, manual repeat right arm 180/80  She was instructed to start taking her losartan 25 mg today and to check her bp at home daily. She will have a phone call follow up in a week to see how her bp responds to addition of losartan.  Orders:    Comprehensive Metabolic Panel; Future

## 2024-10-09 NOTE — ASSESSMENT & PLAN NOTE
3/6/24 A1C 6.9   , HDL 51,  not at goal,    Continue Metformin 500 mg bid, Lipitor 40 mg   Orders:    POCT glycosylated hemoglobin (Hb A1C)    aspirin 81 MG EC tablet; Take 1 tablet by mouth daily    Lipid Panel; Future

## 2024-10-14 ENCOUNTER — HOSPITAL ENCOUNTER (OUTPATIENT)
Age: 69
Discharge: HOME | End: 2024-10-14
Payer: MEDICAID

## 2024-10-14 DIAGNOSIS — I10 HYPERTENSION, UNSPECIFIED TYPE: ICD-10-CM

## 2024-10-14 DIAGNOSIS — Z13.9 SCREENING DUE: ICD-10-CM

## 2024-10-14 DIAGNOSIS — I10 PRIMARY HYPERTENSION: ICD-10-CM

## 2024-10-14 DIAGNOSIS — E55.9 VITAMIN D DEFICIENCY: ICD-10-CM

## 2024-10-14 DIAGNOSIS — E11.9 TYPE 2 DIABETES MELLITUS WITHOUT COMPLICATION, WITHOUT LONG-TERM CURRENT USE OF INSULIN (HCC): ICD-10-CM

## 2024-10-14 DIAGNOSIS — E78.5 HYPERLIPIDEMIA, UNSPECIFIED HYPERLIPIDEMIA TYPE: ICD-10-CM

## 2024-10-14 DIAGNOSIS — J44.9 COPD (CHRONIC OBSTRUCTIVE PULMONARY DISEASE) CASE MANAGEMENT PATIENT (HCC): ICD-10-CM

## 2024-10-14 LAB
25(OH)D3 SERPL-MCNC: 22 NG/ML (ref 30–100)
ALBUMIN SERPL-MCNC: 4.4 G/DL (ref 3.5–5.2)
ALP SERPL-CCNC: 63 U/L (ref 35–104)
ALT SERPL-CCNC: 11 U/L (ref 0–32)
ANION GAP SERPL CALCULATED.3IONS-SCNC: 13 MMOL/L (ref 7–16)
AST SERPL-CCNC: 12 U/L (ref 0–31)
BILIRUB SERPL-MCNC: 0.9 MG/DL (ref 0–1.2)
BUN SERPL-MCNC: 6 MG/DL (ref 6–23)
CALCIUM SERPL-MCNC: 9.8 MG/DL (ref 8.6–10.2)
CHLORIDE SERPL-SCNC: 105 MMOL/L (ref 98–107)
CHOLEST SERPL-MCNC: 147 MG/DL
CO2 SERPL-SCNC: 25 MMOL/L (ref 22–29)
CREAT SERPL-MCNC: 0.6 MG/DL (ref 0.5–1)
CREAT UR-MCNC: 27.8 MG/DL (ref 29–226)
ERYTHROCYTE [DISTWIDTH] IN BLOOD BY AUTOMATED COUNT: 13.6 % (ref 11.5–15)
GFR, ESTIMATED: >90 ML/MIN/1.73M2
GLUCOSE SERPL-MCNC: 111 MG/DL (ref 74–99)
HCT VFR BLD AUTO: 47.8 % (ref 34–48)
HDLC SERPL-MCNC: 49 MG/DL
HGB BLD-MCNC: 15.2 G/DL (ref 11.5–15.5)
LDLC SERPL CALC-MCNC: 86 MG/DL
MCH RBC QN AUTO: 30.3 PG (ref 26–35)
MCHC RBC AUTO-ENTMCNC: 31.8 G/DL (ref 32–34.5)
MCV RBC AUTO: 95.2 FL (ref 80–99.9)
MICROALBUMIN UR-MCNC: 144 MG/L (ref 0–19)
PLATELET # BLD AUTO: 282 K/UL (ref 130–450)
PMV BLD AUTO: 9.4 FL (ref 7–12)
POTASSIUM SERPL-SCNC: 3.8 MMOL/L (ref 3.5–5)
PROT SERPL-MCNC: 8.5 G/DL (ref 6.4–8.3)
RBC # BLD AUTO: 5.02 M/UL (ref 3.5–5.5)
SODIUM SERPL-SCNC: 143 MMOL/L (ref 132–146)
TRIGL SERPL-MCNC: 59 MG/DL
VLDLC SERPL CALC-MCNC: 12 MG/DL
WBC OTHER # BLD: 4.8 K/UL (ref 4.5–11.5)

## 2024-10-14 PROCEDURE — 36415 COLL VENOUS BLD VENIPUNCTURE: CPT

## 2024-10-14 PROCEDURE — 82043 UR ALBUMIN QUANTITATIVE: CPT

## 2024-10-14 PROCEDURE — 80053 COMPREHEN METABOLIC PANEL: CPT

## 2024-10-14 PROCEDURE — 85027 COMPLETE CBC AUTOMATED: CPT

## 2024-10-14 PROCEDURE — 80061 LIPID PANEL: CPT

## 2024-10-14 PROCEDURE — 82570 ASSAY OF URINE CREATININE: CPT

## 2024-10-14 PROCEDURE — 82306 VITAMIN D 25 HYDROXY: CPT

## 2024-10-16 ENCOUNTER — SCHEDULED TELEPHONE ENCOUNTER (OUTPATIENT)
Dept: INTERNAL MEDICINE | Age: 69
End: 2024-10-16
Payer: MEDICARE

## 2024-10-16 DIAGNOSIS — I10 HYPERTENSION, UNSPECIFIED TYPE: ICD-10-CM

## 2024-10-16 PROCEDURE — 99212 OFFICE O/P EST SF 10 MIN: CPT

## 2024-10-16 RX ORDER — LOSARTAN POTASSIUM 100 MG/1
100 TABLET ORAL DAILY
Qty: 90 TABLET | Refills: 0 | Status: SHIPPED | OUTPATIENT
Start: 2024-10-16

## 2024-10-16 NOTE — PROGRESS NOTES
Case discussed with PGY 3  Telephone visit supervised  69-year-old woman with recent increase in blood pressure  Started on losartan 50 mg daily  Blood pressures improved significantly but not at goal  Creatinine and potassium level normal  Increase losartan 100 mg daily  Reassess in a week

## 2024-10-16 NOTE — PROGRESS NOTES
Total Time: minutes: 5-10 minutes     Estefani Chiu was evaluated through a synchronous (real-time) audio encounter. Patient identification was verified at the start of the visit. She (or guardian if applicable) is aware that this is a billable service, which includes applicable co-pays. This visit was conducted with the patient's (and/or legal guardian's) verbal consent. She has not had a related appointment within my department in the past 7 days or scheduled within the next 24 hours.   The patient was located at Home: [address] [place] [address].  The provider was located at Facility (Appt Dept): [address] [place] [address].    Note: not billable if this call serves to triage the patient into an appointment for the relevant concern  Yes, I confirm.   Estefani Chiu is a 69 y.o. female evaluated via telephone on 10/16/2024 for Follow-up and Hypertension  .      Assessment & Plan  Hypertension, unspecified type       Orders:    losartan (COZAAR) 100 MG tablet; Take 1 tablet by mouth daily    Return in about 2 weeks (around 10/30/2024) for telephone visit , bp check.      Subjective   HPI    Primary HTN  Phone visit for HTN follow up   Reviewed CMP 1/14/24, WNL   Average readings of BP at home:  149-180/64-94  Compliant with recently started Cozaar  NO reported side effects or other new symptoms   Will increase losartan to 100 mg tablet daily      Review of Systems   All other systems reviewed and are negative.        Objective   Patient-Reported Vitals  No data recorded       Erin Stein MD

## 2024-10-30 ENCOUNTER — SCHEDULED TELEPHONE ENCOUNTER (OUTPATIENT)
Dept: INTERNAL MEDICINE | Age: 69
End: 2024-10-30

## 2024-10-30 DIAGNOSIS — J32.9 SINUSITIS, UNSPECIFIED CHRONICITY, UNSPECIFIED LOCATION: ICD-10-CM

## 2024-10-30 DIAGNOSIS — I10 PRIMARY HYPERTENSION: Primary | ICD-10-CM

## 2024-10-30 DIAGNOSIS — I10 PRIMARY HYPERTENSION: ICD-10-CM

## 2024-10-30 RX ORDER — CETIRIZINE HYDROCHLORIDE 10 MG/1
10 TABLET ORAL DAILY
Qty: 30 TABLET | Refills: 1 | Status: SHIPPED | OUTPATIENT
Start: 2024-10-30

## 2024-10-30 RX ORDER — HYDROCHLOROTHIAZIDE 25 MG/1
25 TABLET ORAL EVERY MORNING
Qty: 30 TABLET | Refills: 0 | Status: SHIPPED | OUTPATIENT
Start: 2024-10-30

## 2024-10-30 ASSESSMENT — ENCOUNTER SYMPTOMS
CONSTIPATION: 0
DIARRHEA: 0
SINUS PRESSURE: 1
ABDOMINAL PAIN: 0
COUGH: 0
SHORTNESS OF BREATH: 0

## 2024-10-30 NOTE — PATIENT INSTRUCTIONS
Dear Estefani Chiu,    Thank you for coming to your appointment today. I hope we have addressed all of your needs.     Please make sure to do the following:  - Continue your medications as listed.  Added HCTZ 25 mg in the morning, recommend changing losartan to be nightly    - Get labs drawn before our next follow up.    - We will see each other again in 2 weeks    Call for a sooner appointment if you develop any new or worsening symptoms.    Have a great day!    Sincerely,  Jackelyn Nicolas M.D.  10/30/2024  11:47 AM

## 2024-10-30 NOTE — PROGRESS NOTES
[facility]  Internal Medicine Residency Clinic  Attending Physician Statement:  Noel Luciano M.D., F.A.C.P.  I agree with the assessment, plan and orders as documented by the resident.    -Billiing assessed by medical complexity of case  My assessment of high points of todays visit as follows:  Telephone visit  Fu reviewed log - BP lower in evening, high in am  -- still high particularly in am  Losartan recently inc  Change losartan to evening   Same coreg in am (may also consider moving to evening)  Noted lowest HR high 50s  -- 70s    Start hctz 25mg Qam    Get labs - check  K+ cr on these new meds  9min

## 2024-10-30 NOTE — PROGRESS NOTES
Total Time: minutes: 5-10 minutes     Estefani Chiu was evaluated through a synchronous (real-time) audio encounter. Patient identification was verified at the start of the visit. She (or guardian if applicable) is aware that this is a billable service, which includes applicable co-pays. This visit was conducted with the patient's (and/or legal guardian's) verbal consent. She has not had a related appointment within my department in the past 7 days or scheduled within the next 24 hours.   The patient was located at Home: [address] [place] [address].  The provider was located at Facility (Appt Dept): [address] [place] [address].    Note: not billable if this call serves to triage the patient into an appointment for the relevant concern  Yes, I confirm.   Estefani Chiu is a 69 y.o. female evaluated via telephone on 10/30/2024 for Follow-up (2 week follow up bp check), Hypertension (Patient monitoring bp daily ), and Other (Patient stating she is having a pinch in her chest over her heart. Patient states she is getting  this pinch 2 to 3 a week )  .      Assessment & Plan  Primary hypertension    Patient still not at goal and will add another agent at this time.  Recommend adding hydrochlorothiazide 25 mg daily in the morning and changing her losartan 100 mg to be nightly to get better coverage.  Will follow-up with still checking her blood pressure and also repeat a BMP within the next 2 weeks to see how electrolytes are doing.    Orders:    hydroCHLOROthiazide (HYDRODIURIL) 25 MG tablet; Take 1 tablet by mouth every morning    Basic Metabolic Panel; Future    Sinusitis, unspecified chronicity, unspecified location   Chronic, at goal (stable), continue current treatment plan    Orders:    cetirizine (ZYRTEC) 10 MG tablet; Take 1 tablet by mouth daily        Subjective     Patient last had a telephone encounter on 10/16 due to hypertension.  At that time her losartan was increased to

## 2024-10-30 NOTE — ASSESSMENT & PLAN NOTE
Patient still not at goal and will add another agent at this time.  Recommend adding hydrochlorothiazide 25 mg daily in the morning and changing her losartan 100 mg to be nightly to get better coverage.  Will follow-up with still checking her blood pressure and also repeat a BMP within the next 2 weeks to see how electrolytes are doing.    Orders:    hydroCHLOROthiazide (HYDRODIURIL) 25 MG tablet; Take 1 tablet by mouth every morning    Basic Metabolic Panel; Future

## 2024-10-30 NOTE — PROGRESS NOTES
10/15 149/70  56             153/72 70    10/16 160/78 64             153/72 75    10/17   176/92 64               144/63 70    10/18    160/74  56                160/73  76    10/19     162/77  64                 166/77  70    10/20  160/76   76              165/79  73    10/21   154/85  65               142/77   66    10/22   180/84  58               140/77  66     10/23  163/80  66               146/73  73    10/24    168/74  67                142/68   74     10/25  171/80  65               151/74  72    10/26     156/80  63                 151/74  72

## 2024-10-31 RX ORDER — HYDROCHLOROTHIAZIDE 25 MG/1
25 TABLET ORAL EVERY MORNING
Qty: 90 TABLET | OUTPATIENT
Start: 2024-10-31

## 2024-11-04 ENCOUNTER — OFFICE VISIT (OUTPATIENT)
Dept: CARDIOLOGY CLINIC | Age: 69
End: 2024-11-04

## 2024-11-04 VITALS
WEIGHT: 172 LBS | HEART RATE: 66 BPM | SYSTOLIC BLOOD PRESSURE: 146 MMHG | DIASTOLIC BLOOD PRESSURE: 90 MMHG | RESPIRATION RATE: 18 BRPM | HEIGHT: 62 IN | BODY MASS INDEX: 31.65 KG/M2

## 2024-11-04 DIAGNOSIS — R06.09 DOE (DYSPNEA ON EXERTION): ICD-10-CM

## 2024-11-04 DIAGNOSIS — I20.89 OTHER FORMS OF ANGINA PECTORIS (HCC): ICD-10-CM

## 2024-11-04 DIAGNOSIS — R29.818 SUSPECTED SLEEP APNEA: ICD-10-CM

## 2024-11-04 DIAGNOSIS — J43.8 OTHER EMPHYSEMA (HCC): ICD-10-CM

## 2024-11-04 DIAGNOSIS — I10 HYPERTENSION, UNSPECIFIED TYPE: ICD-10-CM

## 2024-11-04 DIAGNOSIS — I10 PRIMARY HYPERTENSION: Primary | ICD-10-CM

## 2024-11-04 DIAGNOSIS — E11.9 TYPE 2 DIABETES MELLITUS WITHOUT COMPLICATION, WITHOUT LONG-TERM CURRENT USE OF INSULIN (HCC): ICD-10-CM

## 2024-11-04 DIAGNOSIS — R59.0 LYMPHADENOPATHY, HILAR: ICD-10-CM

## 2024-11-04 DIAGNOSIS — Q24.8 LEFT VENTRICULAR OUTFLOW TRACT OBSTRUCTION: ICD-10-CM

## 2024-11-04 DIAGNOSIS — R59.0 LYMPHADENOPATHY, MEDIASTINAL: ICD-10-CM

## 2024-11-04 RX ORDER — CARVEDILOL 12.5 MG/1
12.5 TABLET ORAL 2 TIMES DAILY WITH MEALS
Qty: 90 TABLET | Refills: 3 | Status: SHIPPED | OUTPATIENT
Start: 2024-11-04

## 2024-11-04 RX ORDER — CARVEDILOL 12.5 MG/1
12.5 TABLET ORAL 2 TIMES DAILY WITH MEALS
Qty: 180 TABLET | OUTPATIENT
Start: 2024-11-04

## 2024-11-04 NOTE — PROGRESS NOTES
intermittent chest pressure and dyspnea on exertion and fatigue with activities despite recent ischemic evaluation showing above.  Coronary CT angiogram is arranged for further evaluation  Blood pressure is uncontrolled and subsequently carvedilol is increased to 12.5 mg p.o. twice daily  Patient is advised to obtain sleep apnea evaluation with PCP.  If coronary CT angiogram is normal we may discuss cardiac MRI in the near future given LVOT gradient    Follow up Return in about 3 months (around 2/4/2025).     Thank you for allowing me to participate in your patient's care. Please feel free to contact me if you have any questions or concerns.    Bradley Aguila MD  [facility] Cardiology

## 2024-11-05 ENCOUNTER — OFFICE VISIT (OUTPATIENT)
Dept: SLEEP MEDICINE | Age: 69
End: 2024-11-05
Payer: MEDICARE

## 2024-11-05 VITALS
HEART RATE: 80 BPM | SYSTOLIC BLOOD PRESSURE: 146 MMHG | HEIGHT: 62 IN | DIASTOLIC BLOOD PRESSURE: 76 MMHG | OXYGEN SATURATION: 88 % | BODY MASS INDEX: 31.89 KG/M2 | RESPIRATION RATE: 17 BRPM | WEIGHT: 173.28 LBS

## 2024-11-05 DIAGNOSIS — Q24.8 LEFT VENTRICULAR OUTFLOW TRACT OBSTRUCTION: ICD-10-CM

## 2024-11-05 DIAGNOSIS — R06.09 DOE (DYSPNEA ON EXERTION): ICD-10-CM

## 2024-11-05 DIAGNOSIS — E66.9 OBESITY (BMI 30-39.9): ICD-10-CM

## 2024-11-05 DIAGNOSIS — G47.33 OSA (OBSTRUCTIVE SLEEP APNEA): Primary | ICD-10-CM

## 2024-11-05 PROCEDURE — 3017F COLORECTAL CA SCREEN DOC REV: CPT | Performed by: STUDENT IN AN ORGANIZED HEALTH CARE EDUCATION/TRAINING PROGRAM

## 2024-11-05 PROCEDURE — G8400 PT W/DXA NO RESULTS DOC: HCPCS | Performed by: STUDENT IN AN ORGANIZED HEALTH CARE EDUCATION/TRAINING PROGRAM

## 2024-11-05 PROCEDURE — 3077F SYST BP >= 140 MM HG: CPT | Performed by: STUDENT IN AN ORGANIZED HEALTH CARE EDUCATION/TRAINING PROGRAM

## 2024-11-05 PROCEDURE — 1090F PRES/ABSN URINE INCON ASSESS: CPT | Performed by: STUDENT IN AN ORGANIZED HEALTH CARE EDUCATION/TRAINING PROGRAM

## 2024-11-05 PROCEDURE — 3078F DIAST BP <80 MM HG: CPT | Performed by: STUDENT IN AN ORGANIZED HEALTH CARE EDUCATION/TRAINING PROGRAM

## 2024-11-05 PROCEDURE — G8428 CUR MEDS NOT DOCUMENT: HCPCS | Performed by: STUDENT IN AN ORGANIZED HEALTH CARE EDUCATION/TRAINING PROGRAM

## 2024-11-05 PROCEDURE — G8417 CALC BMI ABV UP PARAM F/U: HCPCS | Performed by: STUDENT IN AN ORGANIZED HEALTH CARE EDUCATION/TRAINING PROGRAM

## 2024-11-05 PROCEDURE — 1123F ACP DISCUSS/DSCN MKR DOCD: CPT | Performed by: STUDENT IN AN ORGANIZED HEALTH CARE EDUCATION/TRAINING PROGRAM

## 2024-11-05 PROCEDURE — 1036F TOBACCO NON-USER: CPT | Performed by: STUDENT IN AN ORGANIZED HEALTH CARE EDUCATION/TRAINING PROGRAM

## 2024-11-05 PROCEDURE — 99204 OFFICE O/P NEW MOD 45 MIN: CPT | Performed by: STUDENT IN AN ORGANIZED HEALTH CARE EDUCATION/TRAINING PROGRAM

## 2024-11-05 PROCEDURE — G8484 FLU IMMUNIZE NO ADMIN: HCPCS | Performed by: STUDENT IN AN ORGANIZED HEALTH CARE EDUCATION/TRAINING PROGRAM

## 2024-11-05 ASSESSMENT — SLEEP AND FATIGUE QUESTIONNAIRES
HOW LIKELY ARE YOU TO NOD OFF OR FALL ASLEEP WHILE SITTING INACTIVE IN A PUBLIC PLACE: SLIGHT CHANCE OF DOZING
HOW LIKELY ARE YOU TO NOD OFF OR FALL ASLEEP WHILE WATCHING TV: MODERATE CHANCE OF DOZING
HOW LIKELY ARE YOU TO NOD OFF OR FALL ASLEEP WHILE SITTING AND READING: SLIGHT CHANCE OF DOZING
HOW LIKELY ARE YOU TO NOD OFF OR FALL ASLEEP WHILE SITTING QUIETLY AFTER LUNCH WITHOUT ALCOHOL: SLIGHT CHANCE OF DOZING
ESS TOTAL SCORE: 11
HOW LIKELY ARE YOU TO NOD OFF OR FALL ASLEEP WHEN YOU ARE A PASSENGER IN A CAR FOR AN HOUR WITHOUT A BREAK: HIGH CHANCE OF DOZING
HOW LIKELY ARE YOU TO NOD OFF OR FALL ASLEEP IN A CAR, WHILE STOPPED FOR A FEW MINUTES IN TRAFFIC: WOULD NEVER DOZE
HOW LIKELY ARE YOU TO NOD OFF OR FALL ASLEEP WHILE LYING DOWN TO REST IN THE AFTERNOON WHEN CIRCUMSTANCES PERMIT: MODERATE CHANCE OF DOZING
HOW LIKELY ARE YOU TO NOD OFF OR FALL ASLEEP WHILE SITTING AND TALKING TO SOMEONE: SLIGHT CHANCE OF DOZING

## 2024-11-05 NOTE — PROGRESS NOTES
[facility] Sleep Medicine    Patient Name: Estefani Chiu  Age: 69 y.o.   : 1955  Date of Visit: 24    Assessment and Plan:     1. ADRIEN (obstructive sleep apnea)  high pre-test probability of ADRIEN.We will pursue sleep testing for further evaluation given symptoms listed below.    2. Obesity (BMI 30-39.9)  Rec'd 10-20% weight loss of total body weight (if feasible). Patient instructed on proper nutrition and estimated total daily caloric expenditure for their height and weight. Discussed that ADRIEN may improve with weight loss, but there is no guarantee of reversal.       3. Left ventricular outflow tract obstruction  4. FAY (dyspnea on exertion)  - Chronic, possibly worsening, followed by cardiology, further CTA Evaluation pending.     History:    HPI   Estefani Chiu is a 69 y.o. female with  has no past medical history on file. who presents as a new patient to Sleep Clinic, referred by Dr. Aguila, for Sleep Apnea (No prior sleep study Uses O2 at home 3L)    - On home oxygen as needed (when she feels short of breath while movement)  - Sometimes she will wear it at night (but this is not all of the time)  - Does not think she snores but is unsure  - Tired throughout the day, mostly after 2 PM  - Sleeps 1-2 hours per night off and on (for years)  - Sleeps about 10:30 PM and wakes up at 2:30 AM, will use the restroom, and then she will fall back asleep quickly and then she will wake up very soon thereafter.  Final wake-up time is 5 PM.  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   - No drinking, smoking, or drug use.   - Followed by cardiology, having worsening dyspnea + HTN control as well as LVOT gradient/obstruction with hyperdynamic LV.    PMH:  No past medical history on file.     PSH:  No past surgical history on file.     Soc Hx:  Social History     Tobacco Use    Smoking status: Former     Current packs/day: 0.00     Types: Cigarettes     Start date:      Quit

## 2024-11-11 ENCOUNTER — TELEPHONE (OUTPATIENT)
Dept: PULMONOLOGY | Age: 69
End: 2024-11-11

## 2024-11-11 NOTE — TELEPHONE ENCOUNTER
Tried calling patient to make her aware of the change in her appointment that was scheduled for November 21, 2024 at 9:20 am. Upon calling three different times, there was no answer and the voicemail was full. I sent her a letter to let her know that her appointment is now rescheduled for Monday, December 23, 2024 at 11:00 am. Let her know to call the office if the newly scheduled appointment doesn't work for her.

## 2024-11-25 DIAGNOSIS — R06.09 DOE (DYSPNEA ON EXERTION): Primary | ICD-10-CM

## 2024-12-01 ENCOUNTER — HOSPITAL ENCOUNTER (OUTPATIENT)
Dept: SLEEP CENTER | Age: 69
Discharge: HOME | End: 2024-12-01
Payer: MEDICARE

## 2024-12-01 DIAGNOSIS — G47.33 OSA (OBSTRUCTIVE SLEEP APNEA): ICD-10-CM

## 2024-12-01 PROCEDURE — 95810 POLYSOM 6/> YRS 4/> PARAM: CPT

## 2024-12-01 PROCEDURE — 2700000000 HC OXYGEN THERAPY PER DAY

## 2024-12-02 VITALS
SYSTOLIC BLOOD PRESSURE: 168 MMHG | HEART RATE: 70 BPM | BODY MASS INDEX: 31.65 KG/M2 | OXYGEN SATURATION: 81 % | HEIGHT: 62 IN | WEIGHT: 172 LBS | DIASTOLIC BLOOD PRESSURE: 95 MMHG

## 2024-12-02 ASSESSMENT — SLEEP AND FATIGUE QUESTIONNAIRES
HOW LIKELY ARE YOU TO NOD OFF OR FALL ASLEEP IN A CAR, WHILE STOPPED FOR A FEW MINUTES IN TRAFFIC: WOULD NEVER DOZE
HOW LIKELY ARE YOU TO NOD OFF OR FALL ASLEEP WHILE SITTING AND TALKING TO SOMEONE: WOULD NEVER DOZE
ESS TOTAL SCORE: 15
HOW LIKELY ARE YOU TO NOD OFF OR FALL ASLEEP WHILE SITTING INACTIVE IN A PUBLIC PLACE: WOULD NEVER DOZE
HOW LIKELY ARE YOU TO NOD OFF OR FALL ASLEEP WHILE LYING DOWN TO REST IN THE AFTERNOON WHEN CIRCUMSTANCES PERMIT: HIGH CHANCE OF DOZING
HOW LIKELY ARE YOU TO NOD OFF OR FALL ASLEEP WHILE SITTING AND READING: HIGH CHANCE OF DOZING
HOW LIKELY ARE YOU TO NOD OFF OR FALL ASLEEP WHEN YOU ARE A PASSENGER IN A CAR FOR AN HOUR WITHOUT A BREAK: HIGH CHANCE OF DOZING
HOW LIKELY ARE YOU TO NOD OFF OR FALL ASLEEP WHILE SITTING QUIETLY AFTER LUNCH WITHOUT ALCOHOL: HIGH CHANCE OF DOZING
HOW LIKELY ARE YOU TO NOD OFF OR FALL ASLEEP WHILE WATCHING TV: HIGH CHANCE OF DOZING

## 2024-12-23 ENCOUNTER — HOSPITAL ENCOUNTER (OUTPATIENT)
Age: 69
Discharge: HOME | End: 2024-12-23
Payer: MEDICARE

## 2024-12-23 ENCOUNTER — OFFICE VISIT (OUTPATIENT)
Dept: PULMONOLOGY | Age: 69
End: 2024-12-23
Payer: MEDICARE

## 2024-12-23 ENCOUNTER — TELEPHONE (OUTPATIENT)
Dept: PULMONOLOGY | Age: 69
End: 2024-12-23

## 2024-12-23 VITALS
HEART RATE: 84 BPM | TEMPERATURE: 96.7 F | DIASTOLIC BLOOD PRESSURE: 92 MMHG | RESPIRATION RATE: 16 BRPM | OXYGEN SATURATION: 94 % | SYSTOLIC BLOOD PRESSURE: 199 MMHG

## 2024-12-23 DIAGNOSIS — I10 HYPERTENSION, UNSPECIFIED TYPE: ICD-10-CM

## 2024-12-23 DIAGNOSIS — Z87.891 PERSONAL HISTORY OF TOBACCO USE: Primary | ICD-10-CM

## 2024-12-23 DIAGNOSIS — J44.9 COPD (CHRONIC OBSTRUCTIVE PULMONARY DISEASE) CASE MANAGEMENT PATIENT (HCC): ICD-10-CM

## 2024-12-23 DIAGNOSIS — E78.5 HYPERLIPIDEMIA, UNSPECIFIED HYPERLIPIDEMIA TYPE: ICD-10-CM

## 2024-12-23 DIAGNOSIS — E11.9 TYPE 2 DIABETES MELLITUS WITHOUT COMPLICATION, WITHOUT LONG-TERM CURRENT USE OF INSULIN (HCC): ICD-10-CM

## 2024-12-23 DIAGNOSIS — R06.09 DOE (DYSPNEA ON EXERTION): ICD-10-CM

## 2024-12-23 LAB
ALBUMIN SERPL-MCNC: 3.5 G/DL (ref 3.5–5.2)
ALP SERPL-CCNC: 52 U/L (ref 35–104)
ALT SERPL-CCNC: 12 U/L (ref 0–32)
ANION GAP SERPL CALCULATED.3IONS-SCNC: 11 MMOL/L (ref 7–16)
AST SERPL-CCNC: 15 U/L (ref 0–31)
BILIRUB SERPL-MCNC: 0.6 MG/DL (ref 0–1.2)
BUN SERPL-MCNC: 8 MG/DL (ref 6–23)
CALCIUM SERPL-MCNC: 8.8 MG/DL (ref 8.6–10.2)
CHLORIDE SERPL-SCNC: 103 MMOL/L (ref 98–107)
CO2 SERPL-SCNC: 28 MMOL/L (ref 22–29)
CREAT SERPL-MCNC: 0.6 MG/DL (ref 0.5–1)
GFR, ESTIMATED: >90 ML/MIN/1.73M2
GLUCOSE SERPL-MCNC: 119 MG/DL (ref 74–99)
POTASSIUM SERPL-SCNC: 3.4 MMOL/L (ref 3.5–5)
PROT SERPL-MCNC: 7.5 G/DL (ref 6.4–8.3)
SODIUM SERPL-SCNC: 142 MMOL/L (ref 132–146)

## 2024-12-23 PROCEDURE — 1036F TOBACCO NON-USER: CPT | Performed by: INTERNAL MEDICINE

## 2024-12-23 PROCEDURE — 2022F DILAT RTA XM EVC RTNOPTHY: CPT | Performed by: INTERNAL MEDICINE

## 2024-12-23 PROCEDURE — 1123F ACP DISCUSS/DSCN MKR DOCD: CPT | Performed by: INTERNAL MEDICINE

## 2024-12-23 PROCEDURE — G8484 FLU IMMUNIZE NO ADMIN: HCPCS | Performed by: INTERNAL MEDICINE

## 2024-12-23 PROCEDURE — 1090F PRES/ABSN URINE INCON ASSESS: CPT | Performed by: INTERNAL MEDICINE

## 2024-12-23 PROCEDURE — 3017F COLORECTAL CA SCREEN DOC REV: CPT | Performed by: INTERNAL MEDICINE

## 2024-12-23 PROCEDURE — 80053 COMPREHEN METABOLIC PANEL: CPT

## 2024-12-23 PROCEDURE — 99214 OFFICE O/P EST MOD 30 MIN: CPT | Performed by: INTERNAL MEDICINE

## 2024-12-23 PROCEDURE — G8400 PT W/DXA NO RESULTS DOC: HCPCS | Performed by: INTERNAL MEDICINE

## 2024-12-23 PROCEDURE — G8417 CALC BMI ABV UP PARAM F/U: HCPCS | Performed by: INTERNAL MEDICINE

## 2024-12-23 PROCEDURE — 3077F SYST BP >= 140 MM HG: CPT | Performed by: INTERNAL MEDICINE

## 2024-12-23 PROCEDURE — 3044F HG A1C LEVEL LT 7.0%: CPT | Performed by: INTERNAL MEDICINE

## 2024-12-23 PROCEDURE — 36415 COLL VENOUS BLD VENIPUNCTURE: CPT

## 2024-12-23 PROCEDURE — 3023F SPIROM DOC REV: CPT | Performed by: INTERNAL MEDICINE

## 2024-12-23 PROCEDURE — 3080F DIAST BP >= 90 MM HG: CPT | Performed by: INTERNAL MEDICINE

## 2024-12-23 PROCEDURE — G8427 DOCREV CUR MEDS BY ELIG CLIN: HCPCS | Performed by: INTERNAL MEDICINE

## 2024-12-23 RX ORDER — ARFORMOTEROL TARTRATE 15 UG/2ML
15 SOLUTION RESPIRATORY (INHALATION)
Qty: 120 ML | Refills: 1 | Status: SHIPPED | OUTPATIENT
Start: 2024-12-23

## 2024-12-23 RX ORDER — BUDESONIDE 0.5 MG/2ML
0.5 INHALANT ORAL
Qty: 60 EACH | Refills: 1 | Status: SHIPPED | OUTPATIENT
Start: 2024-12-23

## 2024-12-23 RX ORDER — ALBUTEROL SULFATE 90 UG/1
2 INHALANT RESPIRATORY (INHALATION) 4 TIMES DAILY PRN
Qty: 18 G | Refills: 5 | Status: SHIPPED | OUTPATIENT
Start: 2024-12-23

## 2024-12-23 NOTE — PATIENT INSTRUCTIONS
Cindy Sandoval    [facility]  [address] [place] [id number]  Office: [phone]      Your were seen in the office today for follow up sleep apnea, copd.      We  did not make changes to your medications today.       Testing ordered today was CT scan of the chest due June 2025    Ok to proceed with cataract surgery from a pulmonary stand point.     Please wear your oxygen at night.       Vaccines recommended Influenza              Please do not hesitate to call the office with any questions.

## 2024-12-23 NOTE — TELEPHONE ENCOUNTER
Mailed letter to patient to inform her of the Ct  Lung Screen that is scheduled for her at [place], [place] . This test is scheduled on  Wednesday, January 15, 2025 at  2:30 pm. Please arrive 15 minutes prior to appointment time. No Test Prep is needed

## 2024-12-23 NOTE — PROGRESS NOTES
cardiology  Continue to increase physical activity as tolerated.  Up-to-date vaccinations recommended    I hope this updates you on my evaluation and clinical thinking. Thank you for allowing me to participate in his care.     Sincerely,        Cindy Sandoval DO  [facility]  Office: [phone]  Fax: [phone]

## 2024-12-27 ENCOUNTER — TELEPHONE (OUTPATIENT)
Dept: CARDIOLOGY CLINIC | Age: 69
End: 2024-12-27

## 2024-12-27 DIAGNOSIS — R06.09 DOE (DYSPNEA ON EXERTION): Primary | ICD-10-CM

## 2024-12-27 NOTE — TELEPHONE ENCOUNTER
----- Message from Dr. Bradley Aguila MD sent at 12/27/2024 10:39 AM EST -----  Repeat bmp between today and Monday

## 2024-12-29 DIAGNOSIS — R09.02 HYPOXEMIA: ICD-10-CM

## 2024-12-29 DIAGNOSIS — G47.33 OSA (OBSTRUCTIVE SLEEP APNEA): Primary | ICD-10-CM

## 2024-12-30 ENCOUNTER — HOSPITAL ENCOUNTER (OUTPATIENT)
Age: 69
Discharge: HOME | End: 2024-12-30
Payer: MEDICARE

## 2024-12-30 DIAGNOSIS — R06.09 DOE (DYSPNEA ON EXERTION): ICD-10-CM

## 2024-12-30 LAB
ANION GAP SERPL CALCULATED.3IONS-SCNC: 14 MMOL/L (ref 7–16)
BUN SERPL-MCNC: 10 MG/DL (ref 6–23)
CALCIUM SERPL-MCNC: 9.6 MG/DL (ref 8.6–10.2)
CHLORIDE SERPL-SCNC: 105 MMOL/L (ref 98–107)
CO2 SERPL-SCNC: 26 MMOL/L (ref 22–29)
CREAT SERPL-MCNC: 0.6 MG/DL (ref 0.5–1)
GFR, ESTIMATED: >90 ML/MIN/1.73M2
GLUCOSE SERPL-MCNC: 123 MG/DL (ref 74–99)
POTASSIUM SERPL-SCNC: 3.5 MMOL/L (ref 3.5–5)
SODIUM SERPL-SCNC: 145 MMOL/L (ref 132–146)

## 2024-12-30 PROCEDURE — 36415 COLL VENOUS BLD VENIPUNCTURE: CPT

## 2024-12-30 PROCEDURE — 80048 BASIC METABOLIC PNL TOTAL CA: CPT

## 2024-12-31 ENCOUNTER — HOSPITAL ENCOUNTER (EMERGENCY)
Age: 69
Discharge: HOME | End: 2024-12-31
Payer: MEDICARE

## 2024-12-31 ENCOUNTER — HOSPITAL ENCOUNTER (OUTPATIENT)
Dept: CT IMAGING | Age: 69
Discharge: HOME | End: 2025-01-02
Attending: INTERNAL MEDICINE

## 2024-12-31 ENCOUNTER — APPOINTMENT (OUTPATIENT)
Dept: GENERAL RADIOLOGY | Age: 69
End: 2024-12-31
Payer: MEDICARE

## 2024-12-31 VITALS
OXYGEN SATURATION: 97 % | TEMPERATURE: 98.4 F | HEART RATE: 71 BPM | DIASTOLIC BLOOD PRESSURE: 99 MMHG | RESPIRATION RATE: 16 BRPM | SYSTOLIC BLOOD PRESSURE: 233 MMHG

## 2024-12-31 VITALS
HEIGHT: 62 IN | WEIGHT: 178 LBS | HEART RATE: 60 BPM | TEMPERATURE: 97.7 F | SYSTOLIC BLOOD PRESSURE: 155 MMHG | BODY MASS INDEX: 32.76 KG/M2 | DIASTOLIC BLOOD PRESSURE: 78 MMHG | RESPIRATION RATE: 18 BRPM | OXYGEN SATURATION: 93 %

## 2024-12-31 DIAGNOSIS — I20.89 OTHER FORMS OF ANGINA PECTORIS (HCC): ICD-10-CM

## 2024-12-31 DIAGNOSIS — I10 ASYMPTOMATIC HYPERTENSION: Primary | ICD-10-CM

## 2024-12-31 DIAGNOSIS — R06.09 DOE (DYSPNEA ON EXERTION): ICD-10-CM

## 2024-12-31 LAB
ALBUMIN SERPL-MCNC: 3.6 G/DL (ref 3.5–5.2)
ALP SERPL-CCNC: 56 U/L (ref 35–104)
ALT SERPL-CCNC: 10 U/L (ref 0–32)
ANION GAP SERPL CALCULATED.3IONS-SCNC: 10 MMOL/L (ref 7–16)
AST SERPL-CCNC: 16 U/L (ref 0–31)
ATYPICAL LYMPHOCYTE ABSOLUTE COUNT: 0.26 K/UL (ref 0–0.46)
ATYPICAL LYMPHOCYTES: 4 % (ref 0–4)
BASOPHILS # BLD: 0 K/UL (ref 0–0.2)
BASOPHILS NFR BLD: 0 % (ref 0–2)
BILIRUB SERPL-MCNC: 0.6 MG/DL (ref 0–1.2)
BUN SERPL-MCNC: 9 MG/DL (ref 6–23)
CALCIUM SERPL-MCNC: 8.6 MG/DL (ref 8.6–10.2)
CHLORIDE SERPL-SCNC: 103 MMOL/L (ref 98–107)
CO2 SERPL-SCNC: 28 MMOL/L (ref 22–29)
CREAT SERPL-MCNC: 0.5 MG/DL (ref 0.5–1)
EKG ATRIAL RATE: 60 BPM
EKG P AXIS: 43 DEGREES
EKG P-R INTERVAL: 222 MS
EKG Q-T INTERVAL: 430 MS
EKG QRS DURATION: 74 MS
EKG QTC CALCULATION (BAZETT): 430 MS
EKG R AXIS: 103 DEGREES
EKG T AXIS: 62 DEGREES
EKG VENTRICULAR RATE: 60 BPM
EOSINOPHIL # BLD: 0.16 K/UL (ref 0.05–0.5)
EOSINOPHILS RELATIVE PERCENT: 3 % (ref 0–6)
ERYTHROCYTE [DISTWIDTH] IN BLOOD BY AUTOMATED COUNT: 14.7 % (ref 11.5–15)
GFR, ESTIMATED: >90 ML/MIN/1.73M2
GLUCOSE SERPL-MCNC: 97 MG/DL (ref 74–99)
HCT VFR BLD AUTO: 36.9 % (ref 34–48)
HGB BLD-MCNC: 12 G/DL (ref 11.5–15.5)
LYMPHOCYTES NFR BLD: 2.38 K/UL (ref 1.5–4)
LYMPHOCYTES RELATIVE PERCENT: 40 % (ref 20–42)
MCH RBC QN AUTO: 30.3 PG (ref 26–35)
MCHC RBC AUTO-ENTMCNC: 32.5 G/DL (ref 32–34.5)
MCV RBC AUTO: 93.2 FL (ref 80–99.9)
MONOCYTES NFR BLD: 0.21 K/UL (ref 0.1–0.95)
MONOCYTES NFR BLD: 4 % (ref 2–12)
NEUTROPHILS NFR BLD: 49 % (ref 43–80)
NEUTS SEG NFR BLD: 2.9 K/UL (ref 1.8–7.3)
PLATELET # BLD AUTO: 226 K/UL (ref 130–450)
PMV BLD AUTO: 9.4 FL (ref 7–12)
POTASSIUM SERPL-SCNC: 3.7 MMOL/L (ref 3.5–5)
PROT SERPL-MCNC: 7.6 G/DL (ref 6.4–8.3)
RBC # BLD AUTO: 3.96 M/UL (ref 3.5–5.5)
RBC # BLD: NORMAL 10*6/UL
SODIUM SERPL-SCNC: 141 MMOL/L (ref 132–146)
TROPONIN I SERPL HS-MCNC: 11 NG/L (ref 0–9)
TROPONIN I SERPL HS-MCNC: 12 NG/L (ref 0–9)
WBC OTHER # BLD: 5.9 K/UL (ref 4.5–11.5)

## 2024-12-31 PROCEDURE — 93010 ELECTROCARDIOGRAM REPORT: CPT | Performed by: INTERNAL MEDICINE

## 2024-12-31 PROCEDURE — 6370000000 HC RX 637 (ALT 250 FOR IP): Performed by: PHYSICIAN ASSISTANT

## 2024-12-31 PROCEDURE — 84484 ASSAY OF TROPONIN QUANT: CPT

## 2024-12-31 PROCEDURE — 93005 ELECTROCARDIOGRAM TRACING: CPT | Performed by: PHYSICIAN ASSISTANT

## 2024-12-31 PROCEDURE — 85025 COMPLETE CBC W/AUTO DIFF WBC: CPT

## 2024-12-31 PROCEDURE — 99285 EMERGENCY DEPT VISIT HI MDM: CPT

## 2024-12-31 PROCEDURE — 71046 X-RAY EXAM CHEST 2 VIEWS: CPT

## 2024-12-31 PROCEDURE — 80053 COMPREHEN METABOLIC PANEL: CPT

## 2024-12-31 RX ORDER — AMLODIPINE BESYLATE 10 MG/1
10 TABLET ORAL DAILY
Qty: 30 TABLET | Refills: 3 | Status: SHIPPED | OUTPATIENT
Start: 2024-12-31

## 2024-12-31 RX ORDER — CLONIDINE HYDROCHLORIDE 0.1 MG/1
0.1 TABLET ORAL ONCE
Status: COMPLETED | OUTPATIENT
Start: 2024-12-31 | End: 2024-12-31

## 2024-12-31 RX ADMIN — CLONIDINE HYDROCHLORIDE 0.1 MG: 0.1 TABLET ORAL at 09:36

## 2024-12-31 ASSESSMENT — LIFESTYLE VARIABLES: HOW OFTEN DO YOU HAVE A DRINK CONTAINING ALCOHOL: MONTHLY OR LESS

## 2024-12-31 NOTE — DISCHARGE INSTRUCTIONS
Please continue all of your medications as well as add the Norvasc.  Please follow-up with your family doctor within 1 week

## 2024-12-31 NOTE — PROGRESS NOTES
0715- Patient arrived via ambulation to Radiology department for Coronary CTA. Allergies, home medications, H&P and fasting instructions reviewed with patient. Vital signs taken.     20g IV placed, blood obtained, IV flushed and prn adapter attached. Blood sample sent to lab for ordered tests.  Procedural instructions given, questions answered, understanding expressed and consent signed.

## 2024-12-31 NOTE — PROGRESS NOTES
Patient was walked over Emergency Department, basic report given to Wenid ER RN. Patient taken to registration by ER RN.

## 2024-12-31 NOTE — ED PROVIDER NOTES
Independent NORA Visit         [facility] EMERGENCY DEPARTMENT  ED  Encounter Note  Admit Date/RoomTime: 2024  8:06 AM  ED Room:   NAME: Estefani Chiu  : 1955  MRN: [id number]  PCP: Maxwell Garcia MD    CHIEF COMPLAINT     Hypertension (Sent down from radiology due to high BP. )    HISTORY OF PRESENT ILLNESS        Estefani Chiu is a 69 y.o. female who presents to the ED by private vehicle for referral from outpatient radiology.  Patient has a pertinent medical history of acute respiratory failure with hypoxia due to COPD exacerbation where she was recently admitted.  Patient has has a history of pneumonia.  Patient always wears 3 L of oxygen.  Patient states that since the summer she has noticed some more shortness of breath.  Patient has already seen her pulmonologist and was supposed to come in today as an outpatient procedure to have a CTA coronary ejection fraction and wall motion study by cardiology.  Due to patient's blood pressure being 211/125 they sent her in for further evaluation.  Patient states that she is completely asymptomatic.  Patient states she is still on 3 L of oxygen.  Patient states \"when I walk a lot I get short of breath but this has been always this is not a change.\"  Patient states she currently has no chest pain, shortness of breath, headaches, blurry vision, fever, chills, nausea, vomiting, severe abdominal pain, change in bowel or bladder movements.  Patient states she did take her blood pressure medication about 2 hours ago.  The complaint has been stable and are mild in severity.  Patient has no pain or radiation of any pain.  Patient is still smoking.    REVIEW OF SYSTEMS     Pertinent positives and negatives are stated within HPI, all other systems reviewed and are negative.    Past Medical History:  has no past medical history on file.  Surgical History:  has no past surgical history on file.  Social History:  reports

## 2024-12-31 NOTE — PROGRESS NOTES
IR RN called Dr. Aguila to review patient chart for coronary CTA. Patients blood pressure is elevated (see flowsheet). Dr. Aguila wants patient to go to Emergency Room. Patient aware.

## 2025-01-06 ENCOUNTER — TELEPHONE (OUTPATIENT)
Dept: SLEEP CENTER | Age: 70
End: 2025-01-06

## 2025-01-07 NOTE — TELEPHONE ENCOUNTER
Call to pt to discuss SS results and tx recommendations. Pt has not returned calls. Pt gets her 02 from [facility] will send cpap orders there as to not delay tx. LM re:above and for pt to call call the office to schedule a compliance appt. Dr Aragon updated

## 2025-01-07 NOTE — TELEPHONE ENCOUNTER
Pt returned call discuss SS results and new orders. Pt agreeable. Also discussed compliance, mask exchange and f/u appt

## 2025-01-10 ENCOUNTER — OFFICE VISIT (OUTPATIENT)
Dept: INTERNAL MEDICINE | Age: 70
End: 2025-01-10
Payer: MEDICAID

## 2025-01-10 VITALS
SYSTOLIC BLOOD PRESSURE: 155 MMHG | HEIGHT: 62 IN | TEMPERATURE: 97.2 F | RESPIRATION RATE: 18 BRPM | WEIGHT: 169.2 LBS | DIASTOLIC BLOOD PRESSURE: 84 MMHG | HEART RATE: 73 BPM | OXYGEN SATURATION: 91 % | BODY MASS INDEX: 31.14 KG/M2

## 2025-01-10 DIAGNOSIS — I1A.0 RESISTANT HYPERTENSION: Primary | ICD-10-CM

## 2025-01-10 DIAGNOSIS — E11.9 TYPE 2 DIABETES MELLITUS WITHOUT COMPLICATION, WITHOUT LONG-TERM CURRENT USE OF INSULIN (HCC): ICD-10-CM

## 2025-01-10 DIAGNOSIS — E78.5 HYPERLIPIDEMIA, UNSPECIFIED HYPERLIPIDEMIA TYPE: ICD-10-CM

## 2025-01-10 DIAGNOSIS — J32.9 SINUSITIS, UNSPECIFIED CHRONICITY, UNSPECIFIED LOCATION: ICD-10-CM

## 2025-01-10 PROCEDURE — 99212 OFFICE O/P EST SF 10 MIN: CPT

## 2025-01-10 RX ORDER — ASPIRIN 81 MG/1
81 TABLET ORAL DAILY
Qty: 90 TABLET | Refills: 0 | Status: SHIPPED | OUTPATIENT
Start: 2025-01-10

## 2025-01-10 RX ORDER — CARVEDILOL 12.5 MG/1
25 TABLET ORAL 2 TIMES DAILY WITH MEALS
Qty: 368 TABLET | Refills: 0 | Status: SHIPPED | OUTPATIENT
Start: 2025-01-10

## 2025-01-10 RX ORDER — CARVEDILOL 12.5 MG/1
25 TABLET ORAL 2 TIMES DAILY WITH MEALS
Qty: 90 TABLET | Refills: 3 | Status: SHIPPED
Start: 2025-01-10 | End: 2025-01-10

## 2025-01-10 RX ORDER — CETIRIZINE HYDROCHLORIDE 10 MG/1
10 TABLET ORAL DAILY
Qty: 30 TABLET | Refills: 1 | Status: SHIPPED | OUTPATIENT
Start: 2025-01-10

## 2025-01-10 RX ORDER — HYDROCHLOROTHIAZIDE 25 MG/1
25 TABLET ORAL EVERY MORNING
Qty: 30 TABLET | Refills: 0 | Status: SHIPPED
Start: 2025-01-10 | End: 2025-01-10

## 2025-01-10 RX ORDER — HYDROCHLOROTHIAZIDE 25 MG/1
25 TABLET ORAL EVERY MORNING
Qty: 90 TABLET | Refills: 1 | Status: SHIPPED | OUTPATIENT
Start: 2025-01-10

## 2025-01-10 RX ORDER — LOSARTAN POTASSIUM 100 MG/1
100 TABLET ORAL DAILY
Qty: 90 TABLET | Refills: 0 | Status: SHIPPED | OUTPATIENT
Start: 2025-01-10

## 2025-01-10 RX ORDER — IBUPROFEN 800 MG/1
800 TABLET, FILM COATED ORAL EVERY 8 HOURS PRN
Qty: 90 TABLET | Refills: 0 | Status: SHIPPED | OUTPATIENT
Start: 2025-01-10

## 2025-01-10 SDOH — ECONOMIC STABILITY: FOOD INSECURITY: WITHIN THE PAST 12 MONTHS, THE FOOD YOU BOUGHT JUST DIDN'T LAST AND YOU DIDN'T HAVE MONEY TO GET MORE.: NEVER TRUE

## 2025-01-10 SDOH — ECONOMIC STABILITY: FOOD INSECURITY: WITHIN THE PAST 12 MONTHS, YOU WORRIED THAT YOUR FOOD WOULD RUN OUT BEFORE YOU GOT MONEY TO BUY MORE.: NEVER TRUE

## 2025-01-10 ASSESSMENT — PATIENT HEALTH QUESTIONNAIRE - PHQ9
SUM OF ALL RESPONSES TO PHQ QUESTIONS 1-9: 8
6. FEELING BAD ABOUT YOURSELF - OR THAT YOU ARE A FAILURE OR HAVE LET YOURSELF OR YOUR FAMILY DOWN: NOT AT ALL
8. MOVING OR SPEAKING SO SLOWLY THAT OTHER PEOPLE COULD HAVE NOTICED. OR THE OPPOSITE, BEING SO FIGETY OR RESTLESS THAT YOU HAVE BEEN MOVING AROUND A LOT MORE THAN USUAL: SEVERAL DAYS
7. TROUBLE CONCENTRATING ON THINGS, SUCH AS READING THE NEWSPAPER OR WATCHING TELEVISION: NOT AT ALL
4. FEELING TIRED OR HAVING LITTLE ENERGY: SEVERAL DAYS
2. FEELING DOWN, DEPRESSED OR HOPELESS: NOT AT ALL
10. IF YOU CHECKED OFF ANY PROBLEMS, HOW DIFFICULT HAVE THESE PROBLEMS MADE IT FOR YOU TO DO YOUR WORK, TAKE CARE OF THINGS AT HOME, OR GET ALONG WITH OTHER PEOPLE: SOMEWHAT DIFFICULT
SUM OF ALL RESPONSES TO PHQ9 QUESTIONS 1 & 2: 3
1. LITTLE INTEREST OR PLEASURE IN DOING THINGS: NEARLY EVERY DAY
9. THOUGHTS THAT YOU WOULD BE BETTER OFF DEAD, OR OF HURTING YOURSELF: NOT AT ALL
SUM OF ALL RESPONSES TO PHQ QUESTIONS 1-9: 8
3. TROUBLE FALLING OR STAYING ASLEEP: NEARLY EVERY DAY
SUM OF ALL RESPONSES TO PHQ QUESTIONS 1-9: 8
5. POOR APPETITE OR OVEREATING: NOT AT ALL
SUM OF ALL RESPONSES TO PHQ QUESTIONS 1-9: 8

## 2025-01-10 ASSESSMENT — ENCOUNTER SYMPTOMS
DIARRHEA: 0
VOMITING: 0
CHEST TIGHTNESS: 0
SHORTNESS OF BREATH: 0
NAUSEA: 0
ABDOMINAL PAIN: 0
CONSTIPATION: 0
COUGH: 0

## 2025-01-10 NOTE — PATIENT INSTRUCTIONS
Dear Estefani Chiu,        Thank you for coming to your appointment today. I hope we have addressed all of your needs.       Please make sure to do the following:  - Continue your medications as listed.  - Get labs drawn before our next follow up. We will call you with the results   - We will see each other again in one month    Call for a sooner appointment if you develop syptoms    Have a great day!        Sincerely,  Chapo Cosby MD  1/10/2025  9:04 AM

## 2025-01-10 NOTE — PROGRESS NOTES
[facility]  Internal Medicine Residency Clinic    Attending Physician Statement  I have discussed the case, including pertinent history and exam findings with the resident physician.  I agree with the assessment, plan and orders as documented by the resident. I have reviewed all pertinent PMHx, PSHx, FamHx, SocialHx, medications, and allergies and updated history as appropriate.    Patient here for routine follow up of medical problems.     Uncontrolled Resistant HTN   -blood work evaluated and did not show signs of secondary end organ damage   -resistant HTN w/u ordered   -assymptomatic at th time   -continue HCTZ, amlodipine, and losartan at current levels; and increase coreg to 25 mg BID for appropriate control   -likely will need spironolactone in the future and ?referral to nephrology for treatment resistant HTN     Remainder of medical problems as per resident note.    Vernon Amador Jr, DO  1/10/25    
tablet by mouth daily, Disp-90 tablet, R-0Normal  3. Hyperlipidemia, unspecified hyperlipidemia type  -     aspirin 81 MG EC tablet; Take 1 tablet by mouth daily, Disp-90 tablet, R-0Normal  4. Sinusitis, unspecified chronicity, unspecified location  -     cetirizine (ZYRTEC) 10 MG tablet; Take 1 tablet by mouth daily, Disp-30 tablet, R-1Normal       RTC:  Return in about 1 month (around 2/10/2025) for follow up of hypertension with labs.    FOR NEXT CLINIC VISIT:  Reassess blood pressure on next clinic visit with labs ordered    I have reviewed my findings and recommendations with Estefani Chiu and Dr. Amador.    Chapo Cosby MD   1/10/2025 2:00 PM

## 2025-01-16 ENCOUNTER — HOSPITAL ENCOUNTER (OUTPATIENT)
Age: 70
Discharge: HOME | End: 2025-01-16
Payer: MEDICAID

## 2025-01-16 DIAGNOSIS — I1A.0 RESISTANT HYPERTENSION: ICD-10-CM

## 2025-01-16 PROCEDURE — 84244 ASSAY OF RENIN: CPT

## 2025-01-16 PROCEDURE — 36415 COLL VENOUS BLD VENIPUNCTURE: CPT

## 2025-01-16 PROCEDURE — 82088 ASSAY OF ALDOSTERONE: CPT

## 2025-01-19 LAB
ALDOST SERPL-MCNC: 4.5 NG/DL
ALDOSTERONE COMMENT: NORMAL

## 2025-01-21 LAB
ALDOST/RENIN PLAS-RTO: NORMAL RATIO (ref 0.1–3.7)
RENIN PLAS-MCNC: <2.1 PG/ML

## 2025-01-22 ENCOUNTER — HOSPITAL ENCOUNTER (OUTPATIENT)
Dept: ULTRASOUND IMAGING | Age: 70
Discharge: HOME | End: 2025-01-24
Payer: MEDICAID

## 2025-01-22 DIAGNOSIS — I1A.0 RESISTANT HYPERTENSION: ICD-10-CM

## 2025-01-22 PROCEDURE — 76770 US EXAM ABDO BACK WALL COMP: CPT

## 2025-01-22 PROCEDURE — 93975 VASCULAR STUDY: CPT

## 2025-01-30 DIAGNOSIS — R06.09 DOE (DYSPNEA ON EXERTION): Primary | ICD-10-CM

## 2025-02-05 DIAGNOSIS — E55.9 VITAMIN D DEFICIENCY: ICD-10-CM

## 2025-02-05 RX ORDER — CHOLECALCIFEROL (VITAMIN D3) 50 MCG
1 TABLET ORAL DAILY
Qty: 90 TABLET | Refills: 1 | OUTPATIENT
Start: 2025-02-05

## 2025-02-10 ENCOUNTER — HOSPITAL ENCOUNTER (OUTPATIENT)
Age: 70
Discharge: HOME | End: 2025-02-10
Payer: MEDICARE

## 2025-02-10 DIAGNOSIS — R06.09 DOE (DYSPNEA ON EXERTION): ICD-10-CM

## 2025-02-10 LAB
ALBUMIN SERPL-MCNC: 3.9 G/DL (ref 3.5–5.2)
ALP SERPL-CCNC: 77 U/L (ref 35–104)
ALT SERPL-CCNC: 10 U/L (ref 0–32)
ANION GAP SERPL CALCULATED.3IONS-SCNC: 12 MMOL/L (ref 7–16)
AST SERPL-CCNC: 13 U/L (ref 0–31)
BILIRUB SERPL-MCNC: 0.5 MG/DL (ref 0–1.2)
BUN SERPL-MCNC: 15 MG/DL (ref 6–23)
CALCIUM SERPL-MCNC: 9.8 MG/DL (ref 8.6–10.2)
CHLORIDE SERPL-SCNC: 101 MMOL/L (ref 98–107)
CO2 SERPL-SCNC: 28 MMOL/L (ref 22–29)
CREAT SERPL-MCNC: 0.7 MG/DL (ref 0.5–1)
GFR, ESTIMATED: 88 ML/MIN/1.73M2
GLUCOSE SERPL-MCNC: 140 MG/DL (ref 74–99)
POTASSIUM SERPL-SCNC: 3.9 MMOL/L (ref 3.5–5)
PROT SERPL-MCNC: 8.9 G/DL (ref 6.4–8.3)
SODIUM SERPL-SCNC: 141 MMOL/L (ref 132–146)

## 2025-02-10 PROCEDURE — 80053 COMPREHEN METABOLIC PANEL: CPT

## 2025-02-11 DIAGNOSIS — I1A.0 RESISTANT HYPERTENSION: Primary | ICD-10-CM

## 2025-02-14 ENCOUNTER — OFFICE VISIT (OUTPATIENT)
Dept: INTERNAL MEDICINE | Age: 70
End: 2025-02-14
Payer: MEDICARE

## 2025-02-14 ENCOUNTER — HOSPITAL ENCOUNTER (OUTPATIENT)
Age: 70
Discharge: HOME | End: 2025-02-14
Payer: MEDICARE

## 2025-02-14 VITALS
TEMPERATURE: 97.3 F | SYSTOLIC BLOOD PRESSURE: 178 MMHG | HEIGHT: 62 IN | RESPIRATION RATE: 18 BRPM | OXYGEN SATURATION: 92 % | HEART RATE: 67 BPM | WEIGHT: 159.7 LBS | DIASTOLIC BLOOD PRESSURE: 92 MMHG | BODY MASS INDEX: 29.39 KG/M2

## 2025-02-14 DIAGNOSIS — R77.9 ELEVATED BLOOD PROTEIN: Primary | ICD-10-CM

## 2025-02-14 DIAGNOSIS — J96.01 ACUTE RESPIRATORY FAILURE WITH HYPOXIA (HCC): ICD-10-CM

## 2025-02-14 DIAGNOSIS — I10 ESSENTIAL HYPERTENSION: ICD-10-CM

## 2025-02-14 DIAGNOSIS — I50.9 CONGESTIVE HEART FAILURE, UNSPECIFIED HF CHRONICITY, UNSPECIFIED HEART FAILURE TYPE (HCC): ICD-10-CM

## 2025-02-14 DIAGNOSIS — E85.9 AMYLOIDOSIS, UNSPECIFIED TYPE (HCC): ICD-10-CM

## 2025-02-14 DIAGNOSIS — J32.9 SINUSITIS, UNSPECIFIED CHRONICITY, UNSPECIFIED LOCATION: ICD-10-CM

## 2025-02-14 DIAGNOSIS — J44.9 COPD (CHRONIC OBSTRUCTIVE PULMONARY DISEASE) CASE MANAGEMENT PATIENT (HCC): ICD-10-CM

## 2025-02-14 DIAGNOSIS — R77.9 ELEVATED BLOOD PROTEIN: ICD-10-CM

## 2025-02-14 PROCEDURE — 84165 PROTEIN E-PHORESIS SERUM: CPT

## 2025-02-14 PROCEDURE — 84155 ASSAY OF PROTEIN SERUM: CPT

## 2025-02-14 PROCEDURE — 86335 IMMUNFIX E-PHORSIS/URINE/CSF: CPT

## 2025-02-14 PROCEDURE — 3017F COLORECTAL CA SCREEN DOC REV: CPT

## 2025-02-14 PROCEDURE — 83521 IG LIGHT CHAINS FREE EACH: CPT

## 2025-02-14 PROCEDURE — 36415 COLL VENOUS BLD VENIPUNCTURE: CPT

## 2025-02-14 PROCEDURE — 3023F SPIROM DOC REV: CPT

## 2025-02-14 PROCEDURE — 84156 ASSAY OF PROTEIN URINE: CPT

## 2025-02-14 PROCEDURE — 99212 OFFICE O/P EST SF 10 MIN: CPT

## 2025-02-14 PROCEDURE — 99213 OFFICE O/P EST LOW 20 MIN: CPT

## 2025-02-14 PROCEDURE — 1123F ACP DISCUSS/DSCN MKR DOCD: CPT

## 2025-02-14 PROCEDURE — 1036F TOBACCO NON-USER: CPT

## 2025-02-14 PROCEDURE — G8427 DOCREV CUR MEDS BY ELIG CLIN: HCPCS

## 2025-02-14 PROCEDURE — G8400 PT W/DXA NO RESULTS DOC: HCPCS

## 2025-02-14 PROCEDURE — 86334 IMMUNOFIX E-PHORESIS SERUM: CPT

## 2025-02-14 PROCEDURE — G8417 CALC BMI ABV UP PARAM F/U: HCPCS

## 2025-02-14 PROCEDURE — 3077F SYST BP >= 140 MM HG: CPT

## 2025-02-14 PROCEDURE — 1090F PRES/ABSN URINE INCON ASSESS: CPT

## 2025-02-14 PROCEDURE — 1159F MED LIST DOCD IN RCRD: CPT

## 2025-02-14 PROCEDURE — 84166 PROTEIN E-PHORESIS/URINE/CSF: CPT

## 2025-02-14 PROCEDURE — 3080F DIAST BP >= 90 MM HG: CPT

## 2025-02-14 RX ORDER — CHLORTHALIDONE 25 MG/1
25 TABLET ORAL DAILY
Qty: 30 TABLET | Refills: 3 | Status: SHIPPED | OUTPATIENT
Start: 2025-02-14

## 2025-02-14 RX ORDER — CHLORTHALIDONE 25 MG/1
25 TABLET ORAL DAILY
Qty: 90 TABLET | OUTPATIENT
Start: 2025-02-14

## 2025-02-14 ASSESSMENT — LIFESTYLE VARIABLES
HOW MANY STANDARD DRINKS CONTAINING ALCOHOL DO YOU HAVE ON A TYPICAL DAY: PATIENT DOES NOT DRINK
HOW OFTEN DO YOU HAVE A DRINK CONTAINING ALCOHOL: NEVER

## 2025-02-14 NOTE — PROGRESS NOTES
[facility]  Internal Medicine Residency Clinic    Attending Physician Statement  I have discussed the case, including pertinent history and exam findings with the resident physician.  I agree with the assessment, plan and orders as documented by the resident. I have reviewed the relevant PMHx, PSHx, FamHx, SocialHx, medications, and allergies and updated history as appropriate.    Patient presents for routine follow up of medical problems.    HTN, uncontrolled  Recommend switch hctz to chlorthalidone    Continue amlodipine 10mg, losartan 100mg, coreg 12.5 mg BID (HR 60s).    Mild elevation of serum ptn -- check amyloid labs     Pending CT coronary as recommended per cardiology    ADRIEN with nocturnal hypoxia   Using O2 3L qhs   Follow up with sleep medicine scheduled for PAP therapy    Remainder of medical problems as per resident note.    Khagn Paige,   2/14/2025 10:27 AM

## 2025-02-14 NOTE — PROGRESS NOTES
[facility] Internal Medicine      SUBJECTIVE:  Estefani Chiu (:  1955) is a 69 y.o. female here for evaluation of the following chief complaint(s):  Follow-up  Patient presented to internal medicine clinic for regular follow-up visit.  On presentation she stated she has been having sinus infection and congestion since last 3 weeks.  It started as nasal stuffiness and sinus congestion which initially associated with some drainage but has improved since last 1 week.  Patient was frustrated due to her regular sinus congestion and wanted further evaluation so ENT referral placed to rule out any other causes.  Blood pressure on presentation to the clinic was 192/94, upon repeat measurement was 178/92.  Patient has been having persistent high blood pressure since the prior visit.  Hydrochlorothiazide was discontinued and patient was started on chlorthalidone.  Discussed with patient regarding need for regular blood pressure measurement at home and to make a log which she agreed upon.  Patient was advised to follow-up in the clinic if having persistent high blood pressure.  Patient has a follow-up visit with cardiology on 3/5/2025 and was advised to follow-up with the blood pressure log.  Patient had a sleep study done recently and was advised for CPAP machine which she will be receiving.  Patient was found to have left ventricular outflow tract obstruction in the echocardiogram previously.  Lab works was reviewed which showed persistent increasing protein level.  Investigation for cause for increased protein including amyloidosis versus multi myeloma was sent during the clinic visit.  Patient was described in detail regarding the lab test.    Review of Systems   Constitutional:  Negative for chills, diaphoresis and fever.   HENT:  Positive for rhinorrhea and sinus pressure. Negative for postnasal drip and trouble swallowing.    Respiratory:  Positive for cough.

## 2025-02-14 NOTE — PATIENT INSTRUCTIONS
Thank you for coming to your follow up appointment   Please take your medications as directed and keep your follow up appointment in 1 month.  Call our office if you have any questions or concerns at [phone]  Have your blood work done prior to the next appointment.     Maxwell Garcia MD

## 2025-02-15 LAB
FREE KAPPA/LAMBDA RATIO: 2.15 (ref 0.22–1.74)
KAPPA LC FREE SER-MCNC: 85.9 MG/L
LAMBDA LC FREE SERPL-MCNC: 40 MG/L (ref 4.2–27.7)

## 2025-02-17 LAB
ALBUMIN SERPL-MCNC: 3.2 G/DL (ref 3.5–4.7)
ALPHA1 GLOB SERPL ELPH-MCNC: 0.3 G/DL (ref 0.2–0.4)
ALPHA2 GLOB SERPL ELPH-MCNC: 1 G/DL (ref 0.5–1)
B-GLOBULIN SERPL ELPH-MCNC: 1.5 G/DL (ref 0.8–1.3)
GAMMA GLOB SERPL ELPH-MCNC: 2 G/DL (ref 0.7–1.6)
INTERPRETATION SERPL IFE-IMP: NORMAL
P E INTERPRETATION, U: NORMAL
PATH REV: NORMAL
PATHOLOGIST: ABNORMAL
PATHOLOGIST: NORMAL
PROT PATTERN SERPL ELPH-IMP: ABNORMAL
PROT SERPL-MCNC: 8 G/DL (ref 6.4–8.3)
SPECIMEN TYPE: NORMAL
SPECIMEN TYPE: NORMAL
URINE IFX INTERP: NORMAL

## 2025-02-18 ENCOUNTER — HOSPITAL ENCOUNTER (OUTPATIENT)
Dept: CT IMAGING | Age: 70
Discharge: HOME | End: 2025-02-20
Attending: INTERNAL MEDICINE
Payer: MEDICARE

## 2025-02-18 VITALS
SYSTOLIC BLOOD PRESSURE: 175 MMHG | DIASTOLIC BLOOD PRESSURE: 88 MMHG | HEIGHT: 61 IN | WEIGHT: 160 LBS | BODY MASS INDEX: 30.21 KG/M2 | RESPIRATION RATE: 20 BRPM | HEART RATE: 75 BPM | OXYGEN SATURATION: 92 %

## 2025-02-18 DIAGNOSIS — R06.09 DOE (DYSPNEA ON EXERTION): ICD-10-CM

## 2025-02-18 DIAGNOSIS — I20.89 OTHER FORMS OF ANGINA PECTORIS: ICD-10-CM

## 2025-02-18 PROCEDURE — 2580000003 HC RX 258: Performed by: INTERNAL MEDICINE

## 2025-02-18 PROCEDURE — 75571 CT HRT W/O DYE W/CA TEST: CPT

## 2025-02-18 PROCEDURE — 6370000000 HC RX 637 (ALT 250 FOR IP): Performed by: INTERNAL MEDICINE

## 2025-02-18 RX ORDER — 0.9 % SODIUM CHLORIDE 0.9 %
1000 INTRAVENOUS SOLUTION INTRAVENOUS ONCE
Status: COMPLETED | OUTPATIENT
Start: 2025-02-18 | End: 2025-02-18

## 2025-02-18 RX ORDER — NITROGLYCERIN 0.4 MG/1
0.8 TABLET SUBLINGUAL ONCE
Status: DISCONTINUED | OUTPATIENT
Start: 2025-02-18 | End: 2025-02-21 | Stop reason: HOSPADM

## 2025-02-18 RX ORDER — METOPROLOL TARTRATE 50 MG
100 TABLET ORAL ONCE
Status: COMPLETED | OUTPATIENT
Start: 2025-02-18 | End: 2025-02-18

## 2025-02-18 RX ORDER — METOPROLOL TARTRATE 50 MG
50 TABLET ORAL ONCE
Status: COMPLETED | OUTPATIENT
Start: 2025-02-18 | End: 2025-02-18

## 2025-02-18 RX ORDER — METOPROLOL TARTRATE 1 MG/ML
5 INJECTION, SOLUTION INTRAVENOUS EVERY 5 MIN PRN
Status: DISCONTINUED | OUTPATIENT
Start: 2025-02-18 | End: 2025-02-21 | Stop reason: HOSPADM

## 2025-02-18 RX ADMIN — METOPROLOL TARTRATE 50 MG: 50 TABLET, FILM COATED ORAL at 10:10

## 2025-02-18 RX ADMIN — METOPROLOL TARTRATE 100 MG: 50 TABLET, FILM COATED ORAL at 08:43

## 2025-02-18 RX ADMIN — SODIUM CHLORIDE 1000 ML: 0.9 INJECTION, SOLUTION INTRAVENOUS at 08:43

## 2025-02-18 NOTE — PROCEDURES
0700Patient arrived for CTA coronary arteries. To CT on arrival for calcium score.     0735 calcium score complete, ok to proceed.  Allergies reviewed, instructions given to  include protocol for heart rate, b/p, necessary medications that will be given, IV site and proper breath hold during scan. Questions answered and expressed understanding confirmed. Placed on monitoring devices for heart rate and rhythm, B/P taken,  IV flushed / started, flushed and prn adapter attached.      0810 Dr Aguila called, voicemail left for retun call.    0830  Dr Aguila returned call, notified of patient arrival, history, test/lab results, home medications, vital signs and rhythm.  Orders received.

## 2025-02-18 NOTE — PROCEDURES
1100 Pt hr consistently in 70's , pt states that she hs to go to work and cannot wait for exam.  She is willing to return for test on another day.    Dr Aguila called and message left regarding pt status.  Dr Aguila's office faxed to notify staff

## 2025-03-03 ENCOUNTER — TELEPHONE (OUTPATIENT)
Dept: CARDIOLOGY CLINIC | Age: 70
End: 2025-03-03

## 2025-03-03 DIAGNOSIS — R06.09 DOE (DYSPNEA ON EXERTION): Primary | ICD-10-CM

## 2025-03-03 NOTE — TELEPHONE ENCOUNTER
----- Message from Dr. Bradley Aguila MD sent at 3/1/2025 11:55 AM EST -----  Please arrange for cardiac PET stress test for further evaluation

## 2025-03-05 ENCOUNTER — OFFICE VISIT (OUTPATIENT)
Dept: CARDIOLOGY CLINIC | Age: 70
End: 2025-03-05
Payer: MEDICARE

## 2025-03-05 VITALS
DIASTOLIC BLOOD PRESSURE: 70 MMHG | TEMPERATURE: 97.7 F | HEART RATE: 78 BPM | HEIGHT: 62 IN | SYSTOLIC BLOOD PRESSURE: 120 MMHG | OXYGEN SATURATION: 91 % | WEIGHT: 169.6 LBS | BODY MASS INDEX: 31.21 KG/M2 | RESPIRATION RATE: 18 BRPM

## 2025-03-05 DIAGNOSIS — R59.0 LYMPHADENOPATHY, MEDIASTINAL: ICD-10-CM

## 2025-03-05 DIAGNOSIS — J41.8 MIXED SIMPLE AND MUCOPURULENT CHRONIC BRONCHITIS (HCC): ICD-10-CM

## 2025-03-05 DIAGNOSIS — R29.818 SUSPECTED SLEEP APNEA: ICD-10-CM

## 2025-03-05 DIAGNOSIS — E11.9 TYPE 2 DIABETES MELLITUS WITHOUT COMPLICATION, WITHOUT LONG-TERM CURRENT USE OF INSULIN (HCC): ICD-10-CM

## 2025-03-05 DIAGNOSIS — R59.0 LYMPHADENOPATHY, HILAR: ICD-10-CM

## 2025-03-05 DIAGNOSIS — E66.09 CLASS 1 OBESITY DUE TO EXCESS CALORIES WITH SERIOUS COMORBIDITY AND BODY MASS INDEX (BMI) OF 32.0 TO 32.9 IN ADULT: ICD-10-CM

## 2025-03-05 DIAGNOSIS — I10 HYPERTENSION, UNSPECIFIED TYPE: Primary | ICD-10-CM

## 2025-03-05 DIAGNOSIS — I50.9 CONGESTIVE HEART FAILURE, UNSPECIFIED HF CHRONICITY, UNSPECIFIED HEART FAILURE TYPE (HCC): ICD-10-CM

## 2025-03-05 DIAGNOSIS — Q24.8 LEFT VENTRICULAR OUTFLOW TRACT OBSTRUCTION: ICD-10-CM

## 2025-03-05 DIAGNOSIS — I20.89 OTHER FORMS OF ANGINA PECTORIS: ICD-10-CM

## 2025-03-05 DIAGNOSIS — E66.811 CLASS 1 OBESITY DUE TO EXCESS CALORIES WITH SERIOUS COMORBIDITY AND BODY MASS INDEX (BMI) OF 32.0 TO 32.9 IN ADULT: ICD-10-CM

## 2025-03-05 PROCEDURE — 3074F SYST BP LT 130 MM HG: CPT | Performed by: INTERNAL MEDICINE

## 2025-03-05 PROCEDURE — 99214 OFFICE O/P EST MOD 30 MIN: CPT | Performed by: INTERNAL MEDICINE

## 2025-03-05 PROCEDURE — G8427 DOCREV CUR MEDS BY ELIG CLIN: HCPCS | Performed by: INTERNAL MEDICINE

## 2025-03-05 PROCEDURE — 2022F DILAT RTA XM EVC RTNOPTHY: CPT | Performed by: INTERNAL MEDICINE

## 2025-03-05 PROCEDURE — G8417 CALC BMI ABV UP PARAM F/U: HCPCS | Performed by: INTERNAL MEDICINE

## 2025-03-05 PROCEDURE — 3078F DIAST BP <80 MM HG: CPT | Performed by: INTERNAL MEDICINE

## 2025-03-05 PROCEDURE — 1123F ACP DISCUSS/DSCN MKR DOCD: CPT | Performed by: INTERNAL MEDICINE

## 2025-03-05 PROCEDURE — 93000 ELECTROCARDIOGRAM COMPLETE: CPT | Performed by: INTERNAL MEDICINE

## 2025-03-05 PROCEDURE — 3046F HEMOGLOBIN A1C LEVEL >9.0%: CPT | Performed by: INTERNAL MEDICINE

## 2025-03-05 PROCEDURE — 1036F TOBACCO NON-USER: CPT | Performed by: INTERNAL MEDICINE

## 2025-03-05 PROCEDURE — 3023F SPIROM DOC REV: CPT | Performed by: INTERNAL MEDICINE

## 2025-03-05 PROCEDURE — 3017F COLORECTAL CA SCREEN DOC REV: CPT | Performed by: INTERNAL MEDICINE

## 2025-03-05 PROCEDURE — 1159F MED LIST DOCD IN RCRD: CPT | Performed by: INTERNAL MEDICINE

## 2025-03-05 PROCEDURE — 1090F PRES/ABSN URINE INCON ASSESS: CPT | Performed by: INTERNAL MEDICINE

## 2025-03-05 PROCEDURE — G8400 PT W/DXA NO RESULTS DOC: HCPCS | Performed by: INTERNAL MEDICINE

## 2025-03-05 NOTE — PROGRESS NOTES
Out PATIENT New CARDIOLOGY CONSULT    Name: Estefani Chiu    Age: 69 y.o.    Date of Admission: No admission date for patient encounter.    Date of Service: 3/10/2025    Reason for Consultation:   Chief Complaint   Patient presents with    Follow-up          Referring Physician: No admitting provider for patient encounter.    History of Present Illness: 69-year-old female with below history who was recently hospitalized for respiratory failure, chest pain and shortness of breath as well as dizziness and lightheadedness and had mild troponin leak.  Echocardiogram revealed hyperdynamic left ventricle with LVOT gradient/obstruction her medications were adjusted and afterload reduction agents were removed to avoid worsening LVOT gradients.  During prior visit, her blood pressure was uncontrolled and subsequently carvedilol was increased to 12.5 mg p.o. twice daily.  During previous visit hydrochlorothiazide and losartan were switched to carvedilol due to LVOT gradient but the patient is now back on this medication.  He report PCP refilled them.  Despite this patient report doing well and denies any symptoms and states she is active without any symptoms.  Because she is doing well she has declined to undergo further cardiac testing and has declined undergoing cardiac PET stress test as she reports she is doing well and active without any symptoms        Past Medical History:  Hyperdynamic left ventricle  Borderline troponin leak  HLP  LVOT obstruction  FAY  Dizziness/LH  SOB  HTN  Chest pain  SOB  Acute Hypoxic respiratory failure  Chronic respiratory failure with 4 L O2 nasal,  T2 DM - Metformin  1/07/2014 A1C 7.1  12/27/2023 A1C 7.0  3/06/2024 A1C 6.9  HLD - not on statin  Bursitis  Arthritis  Carpal Tunnel  Pinched nerve in elbow  Sinusitis   CVA 2009 - reports no residuals  Former      Former      Past Medical History:   Diagnosis Date    Asthma     Cerebral artery occlusion with cerebral infarction (HCC)

## 2025-03-11 DIAGNOSIS — D47.2 MGUS (MONOCLONAL GAMMOPATHY OF UNKNOWN SIGNIFICANCE): Primary | ICD-10-CM

## 2025-03-17 NOTE — PROGRESS NOTES
[facility] Sleep Medicine    Patient Name: Estefani Chiu  Age: 69 y.o.   : 1955  Date of Visit: 3/17/25    Assessment and Plan:   1. ADRIEN (obstructive sleep apnea)  Discussed sleep study results, emphasized severity of study and diagnosis including possible clinical sequelae of microvascular disease including higher incidence of stroke, atrial fibrillation, hypertension, or other cognitive microvascular damage. Counseled on appropriate use of the device, such as using distilled water, daily cleaning of mask and tube with gentle soap and water or non-toxic wipes. Counseled on troubleshooting device (such as rainout) and being aware of the ambient humidity of the room. Referral to [facility].      2. Restrictive lung disease  Management per pulmonology. Doing well with nocturnal supplemental oxygen.      History:    HPI   Estefani Chiu is a 69 y.o. female with  has a past medical history of Asthma, Cerebral artery occlusion with cerebral infarction (HCC) (2009), Diabetes mellitus ([facility]), Hyperlipidemia, and Hypertension. who presents as a follow-up patient to Sleep Clinic for Sleep Apnea    - Unable to get CPAP set up  - Had received calls from DME but could not correspond with them  - Would like to change DME companies to [facility]  - Continues to have snoring, fatigue  - Prior history of mild ADRIEN with restrictive lung disease noted on most recent PFTs    Current Outpatient Medications   Medication Sig Dispense Refill    chlorthalidone (HYGROTON) 25 MG tablet Take 1 tablet by mouth daily 30 tablet 3    aspirin 81 MG EC tablet Take 1 tablet by mouth daily 90 tablet 0    cetirizine (ZYRTEC) 10 MG tablet Take 1 tablet by mouth daily 30 tablet 1    ibuprofen (ADVIL;MOTRIN) 800 MG tablet Take 1 tablet by mouth every 8 hours as needed for Pain 90 tablet 0    losartan (COZAAR) 100 MG tablet Take 1 tablet by mouth daily 90 tablet 0    Handicap Placard MISC by Does not apply route

## 2025-03-18 ENCOUNTER — OFFICE VISIT (OUTPATIENT)
Dept: SLEEP MEDICINE | Age: 70
End: 2025-03-18
Payer: MEDICARE

## 2025-03-18 VITALS
TEMPERATURE: 98.2 F | OXYGEN SATURATION: 95 % | SYSTOLIC BLOOD PRESSURE: 128 MMHG | HEIGHT: 62 IN | WEIGHT: 165.57 LBS | RESPIRATION RATE: 14 BRPM | HEART RATE: 98 BPM | BODY MASS INDEX: 30.47 KG/M2 | DIASTOLIC BLOOD PRESSURE: 69 MMHG

## 2025-03-18 DIAGNOSIS — J98.4 RESTRICTIVE LUNG DISEASE: ICD-10-CM

## 2025-03-18 DIAGNOSIS — G47.33 OSA (OBSTRUCTIVE SLEEP APNEA): Primary | ICD-10-CM

## 2025-03-18 PROCEDURE — 1159F MED LIST DOCD IN RCRD: CPT | Performed by: STUDENT IN AN ORGANIZED HEALTH CARE EDUCATION/TRAINING PROGRAM

## 2025-03-18 PROCEDURE — 3017F COLORECTAL CA SCREEN DOC REV: CPT | Performed by: STUDENT IN AN ORGANIZED HEALTH CARE EDUCATION/TRAINING PROGRAM

## 2025-03-18 PROCEDURE — 1090F PRES/ABSN URINE INCON ASSESS: CPT | Performed by: STUDENT IN AN ORGANIZED HEALTH CARE EDUCATION/TRAINING PROGRAM

## 2025-03-18 PROCEDURE — 1036F TOBACCO NON-USER: CPT | Performed by: STUDENT IN AN ORGANIZED HEALTH CARE EDUCATION/TRAINING PROGRAM

## 2025-03-18 PROCEDURE — 3078F DIAST BP <80 MM HG: CPT | Performed by: STUDENT IN AN ORGANIZED HEALTH CARE EDUCATION/TRAINING PROGRAM

## 2025-03-18 PROCEDURE — 3074F SYST BP LT 130 MM HG: CPT | Performed by: STUDENT IN AN ORGANIZED HEALTH CARE EDUCATION/TRAINING PROGRAM

## 2025-03-18 PROCEDURE — G8417 CALC BMI ABV UP PARAM F/U: HCPCS | Performed by: STUDENT IN AN ORGANIZED HEALTH CARE EDUCATION/TRAINING PROGRAM

## 2025-03-18 PROCEDURE — 99214 OFFICE O/P EST MOD 30 MIN: CPT | Performed by: STUDENT IN AN ORGANIZED HEALTH CARE EDUCATION/TRAINING PROGRAM

## 2025-03-18 PROCEDURE — G8427 DOCREV CUR MEDS BY ELIG CLIN: HCPCS | Performed by: STUDENT IN AN ORGANIZED HEALTH CARE EDUCATION/TRAINING PROGRAM

## 2025-03-18 PROCEDURE — 1123F ACP DISCUSS/DSCN MKR DOCD: CPT | Performed by: STUDENT IN AN ORGANIZED HEALTH CARE EDUCATION/TRAINING PROGRAM

## 2025-03-18 PROCEDURE — G8400 PT W/DXA NO RESULTS DOC: HCPCS | Performed by: STUDENT IN AN ORGANIZED HEALTH CARE EDUCATION/TRAINING PROGRAM

## 2025-03-18 ASSESSMENT — SLEEP AND FATIGUE QUESTIONNAIRES
HOW LIKELY ARE YOU TO NOD OFF OR FALL ASLEEP WHILE SITTING INACTIVE IN A PUBLIC PLACE: MODERATE CHANCE OF DOZING
ESS TOTAL SCORE: 10
HOW LIKELY ARE YOU TO NOD OFF OR FALL ASLEEP WHILE SITTING AND READING: SLIGHT CHANCE OF DOZING
HOW LIKELY ARE YOU TO NOD OFF OR FALL ASLEEP WHEN YOU ARE A PASSENGER IN A CAR FOR AN HOUR WITHOUT A BREAK: HIGH CHANCE OF DOZING
HOW LIKELY ARE YOU TO NOD OFF OR FALL ASLEEP WHILE SITTING QUIETLY AFTER LUNCH WITHOUT ALCOHOL: WOULD NEVER DOZE
HOW LIKELY ARE YOU TO NOD OFF OR FALL ASLEEP WHILE LYING DOWN TO REST IN THE AFTERNOON WHEN CIRCUMSTANCES PERMIT: SLIGHT CHANCE OF DOZING
HOW LIKELY ARE YOU TO NOD OFF OR FALL ASLEEP WHILE WATCHING TV: MODERATE CHANCE OF DOZING
HOW LIKELY ARE YOU TO NOD OFF OR FALL ASLEEP IN A CAR, WHILE STOPPED FOR A FEW MINUTES IN TRAFFIC: WOULD NEVER DOZE
HOW LIKELY ARE YOU TO NOD OFF OR FALL ASLEEP WHILE SITTING AND TALKING TO SOMEONE: SLIGHT CHANCE OF DOZING

## 2025-04-16 DIAGNOSIS — E78.5 HYPERLIPIDEMIA, UNSPECIFIED HYPERLIPIDEMIA TYPE: ICD-10-CM

## 2025-04-16 DIAGNOSIS — E11.9 TYPE 2 DIABETES MELLITUS WITHOUT COMPLICATION, WITHOUT LONG-TERM CURRENT USE OF INSULIN: ICD-10-CM

## 2025-04-16 RX ORDER — ASPIRIN 81 MG/1
81 TABLET ORAL DAILY
Qty: 90 TABLET | Refills: 0 | Status: SHIPPED | OUTPATIENT
Start: 2025-04-16

## 2025-04-16 NOTE — TELEPHONE ENCOUNTER
Name of Medication(s) Requested:  Requested Prescriptions     Pending Prescriptions Disp Refills    aspirin 81 MG EC tablet 90 tablet 0     Sig: Take 1 tablet by mouth daily       Medication is on current medication list Yes    Dosage and directions were verified? Yes    Quantity verified: 90 day supply     Pharmacy Verified?  Yes    Last Appointment:  2/14/2025    Future appts:  Future Appointments   Date Time Provider Department Center   4/28/2025  8:45 AM Wilbert Landin DO [place] [facility]   5/6/2025 10:15 AM Bairon Mcnulty MD [facility]   6/17/2025  9:30 AM Puma Aragon MD [place] [facility]   7/7/2025 10:40 AM Cindy Sandoval DO [facility]   9/8/2025  8:30 AM Bradley Aguila MD [facility]        (If no appt send self scheduling link. .REFILLAPPT)  Scheduling request sent?     [] Yes  [x] No    Does patient need updated?  [] Yes  [x] No

## 2025-04-22 DIAGNOSIS — I1A.0 RESISTANT HYPERTENSION: ICD-10-CM

## 2025-04-22 RX ORDER — LOSARTAN POTASSIUM 100 MG/1
100 TABLET ORAL DAILY
Qty: 90 TABLET | Refills: 0 | Status: SHIPPED | OUTPATIENT
Start: 2025-04-22

## 2025-04-22 NOTE — TELEPHONE ENCOUNTER
Name of Medication(s) Requested:  Requested Prescriptions     Pending Prescriptions Disp Refills    losartan (COZAAR) 100 MG tablet 90 tablet 0     Sig: Take 1 tablet by mouth daily       Medication is on current medication list Yes    Dosage and directions were verified? Yes    Quantity verified: 90 day supply     Pharmacy Verified?  Yes    Last Appointment:  2/14/2025    Future appts:  Future Appointments   Date Time Provider Department Center   4/28/2025  8:45 AM Wilbert Landin DO [place] [facility]   5/6/2025 10:15 AM Bairon Mcnulty MD [facility]   6/17/2025  9:30 AM Puma Aragon MD [place] [facility]   7/7/2025 10:40 AM Cindy Sandoval DO [facility]   9/8/2025  8:30 AM Bradley Aguila MD [facility]        (If no appt send self scheduling link. .REFILLAPPT)  Scheduling request sent?     [] Yes  [x] No    Does patient need updated?  [] Yes  [x] No

## 2025-04-28 ENCOUNTER — OFFICE VISIT (OUTPATIENT)
Dept: ENT CLINIC | Age: 70
End: 2025-04-28
Payer: MEDICARE

## 2025-04-28 VITALS
HEART RATE: 63 BPM | TEMPERATURE: 98 F | DIASTOLIC BLOOD PRESSURE: 75 MMHG | WEIGHT: 161 LBS | HEIGHT: 62 IN | BODY MASS INDEX: 29.63 KG/M2 | SYSTOLIC BLOOD PRESSURE: 125 MMHG | OXYGEN SATURATION: 97 % | RESPIRATION RATE: 14 BRPM

## 2025-04-28 DIAGNOSIS — J30.9 CHRONIC ALLERGIC RHINITIS: Primary | ICD-10-CM

## 2025-04-28 DIAGNOSIS — R09.81 NASAL CONGESTION: ICD-10-CM

## 2025-04-28 DIAGNOSIS — Z91.09 ENVIRONMENTAL ALLERGIES: ICD-10-CM

## 2025-04-28 PROCEDURE — G8400 PT W/DXA NO RESULTS DOC: HCPCS | Performed by: OTOLARYNGOLOGY

## 2025-04-28 PROCEDURE — 3078F DIAST BP <80 MM HG: CPT | Performed by: OTOLARYNGOLOGY

## 2025-04-28 PROCEDURE — G8417 CALC BMI ABV UP PARAM F/U: HCPCS | Performed by: OTOLARYNGOLOGY

## 2025-04-28 PROCEDURE — 3074F SYST BP LT 130 MM HG: CPT | Performed by: OTOLARYNGOLOGY

## 2025-04-28 PROCEDURE — 1123F ACP DISCUSS/DSCN MKR DOCD: CPT | Performed by: OTOLARYNGOLOGY

## 2025-04-28 PROCEDURE — 99204 OFFICE O/P NEW MOD 45 MIN: CPT | Performed by: OTOLARYNGOLOGY

## 2025-04-28 PROCEDURE — G8427 DOCREV CUR MEDS BY ELIG CLIN: HCPCS | Performed by: OTOLARYNGOLOGY

## 2025-04-28 PROCEDURE — 1036F TOBACCO NON-USER: CPT | Performed by: OTOLARYNGOLOGY

## 2025-04-28 PROCEDURE — 1159F MED LIST DOCD IN RCRD: CPT | Performed by: OTOLARYNGOLOGY

## 2025-04-28 PROCEDURE — 1090F PRES/ABSN URINE INCON ASSESS: CPT | Performed by: OTOLARYNGOLOGY

## 2025-04-28 PROCEDURE — 3017F COLORECTAL CA SCREEN DOC REV: CPT | Performed by: OTOLARYNGOLOGY

## 2025-04-28 RX ORDER — IPRATROPIUM BROMIDE 42 UG/1
2 SPRAY, METERED NASAL 4 TIMES DAILY
Qty: 15 ML | Refills: 3 | Status: SHIPPED | OUTPATIENT
Start: 2025-04-28

## 2025-04-28 RX ORDER — MONTELUKAST SODIUM 10 MG/1
10 TABLET ORAL DAILY
Qty: 30 TABLET | Refills: 3 | Status: SHIPPED | OUTPATIENT
Start: 2025-04-28

## 2025-04-29 ASSESSMENT — ENCOUNTER SYMPTOMS
COUGH: 0
VOMITING: 0
SHORTNESS OF BREATH: 0

## 2025-04-29 NOTE — PROGRESS NOTES
54.0 years (54.0 ttl pk-yrs)     Types: Cigarettes     Start date: 1969    Smokeless tobacco: Never    Tobacco comments:     Pt smoked about a pack and half for years and then to a pack and then to just weekends has quit in 12/2023   Vaping Use    Vaping status: Never Used   Substance Use Topics    Alcohol use: Yes     Alcohol/week: 2.0 standard drinks of alcohol     Types: 2 Cans of beer per week     Comment: on weekends    Drug use: Never     Family History   Problem Relation Age of Onset    High Blood Pressure Mother     Heart Disease Sister        Review of Systems   Constitutional:  Negative for chills and fever.   HENT:  Negative for ear discharge and hearing loss.    Respiratory:  Negative for cough and shortness of breath.    Cardiovascular:  Negative for chest pain and palpitations.   Gastrointestinal:  Negative for vomiting.   Skin:  Negative for rash.   Allergic/Immunologic: Negative for environmental allergies.   Neurological:  Negative for dizziness and headaches.   Hematological:  Does not bruise/bleed easily.   All other systems reviewed and are negative.      /75 (BP Site: Left Upper Arm, Patient Position: Sitting, BP Cuff Size: Medium Adult)   Pulse 63   Temp 98 °F (36.7 °C)   Resp 14   Ht 1.575 m (5' 2.01\")   Wt 73 kg (161 lb)   SpO2 97%   BMI 29.44 kg/m²   Physical Exam  Vitals and nursing note reviewed.   Constitutional:       Appearance: She is well-developed.   HENT:      Head: Normocephalic and atraumatic.      Right Ear: Tympanic membrane and ear canal normal.      Left Ear: Tympanic membrane and ear canal normal.      Nose: No congestion or rhinorrhea.   Eyes:      Pupils: Pupils are equal, round, and reactive to light.   Neck:      Thyroid: No thyromegaly.      Trachea: No tracheal deviation.   Cardiovascular:      Rate and Rhythm: Normal rate.   Pulmonary:      Effort: Pulmonary effort is normal. No respiratory distress.   Musculoskeletal:         General: Normal range of

## 2025-05-06 ENCOUNTER — OFFICE VISIT (OUTPATIENT)
Dept: INTERNAL MEDICINE | Age: 70
End: 2025-05-06
Payer: MEDICARE

## 2025-05-06 VITALS
OXYGEN SATURATION: 93 % | BODY MASS INDEX: 30.36 KG/M2 | HEART RATE: 69 BPM | TEMPERATURE: 97.8 F | SYSTOLIC BLOOD PRESSURE: 115 MMHG | DIASTOLIC BLOOD PRESSURE: 60 MMHG | HEIGHT: 62 IN | WEIGHT: 165 LBS | RESPIRATION RATE: 14 BRPM

## 2025-05-06 DIAGNOSIS — I1A.0 RESISTANT HYPERTENSION: ICD-10-CM

## 2025-05-06 DIAGNOSIS — E78.5 HYPERLIPIDEMIA, UNSPECIFIED HYPERLIPIDEMIA TYPE: ICD-10-CM

## 2025-05-06 DIAGNOSIS — I10 ESSENTIAL HYPERTENSION: ICD-10-CM

## 2025-05-06 DIAGNOSIS — E11.9 TYPE 2 DIABETES MELLITUS WITHOUT COMPLICATION, WITHOUT LONG-TERM CURRENT USE OF INSULIN (HCC): ICD-10-CM

## 2025-05-06 DIAGNOSIS — E55.9 VITAMIN D DEFICIENCY: ICD-10-CM

## 2025-05-06 DIAGNOSIS — J32.9 SINUSITIS, UNSPECIFIED CHRONICITY, UNSPECIFIED LOCATION: ICD-10-CM

## 2025-05-06 PROCEDURE — 2022F DILAT RTA XM EVC RTNOPTHY: CPT

## 2025-05-06 PROCEDURE — 1123F ACP DISCUSS/DSCN MKR DOCD: CPT

## 2025-05-06 PROCEDURE — G8417 CALC BMI ABV UP PARAM F/U: HCPCS

## 2025-05-06 PROCEDURE — G8400 PT W/DXA NO RESULTS DOC: HCPCS

## 2025-05-06 PROCEDURE — 99213 OFFICE O/P EST LOW 20 MIN: CPT

## 2025-05-06 PROCEDURE — 3046F HEMOGLOBIN A1C LEVEL >9.0%: CPT

## 2025-05-06 PROCEDURE — 1160F RVW MEDS BY RX/DR IN RCRD: CPT

## 2025-05-06 PROCEDURE — 1090F PRES/ABSN URINE INCON ASSESS: CPT

## 2025-05-06 PROCEDURE — 3078F DIAST BP <80 MM HG: CPT

## 2025-05-06 PROCEDURE — G8427 DOCREV CUR MEDS BY ELIG CLIN: HCPCS

## 2025-05-06 PROCEDURE — 3074F SYST BP LT 130 MM HG: CPT

## 2025-05-06 PROCEDURE — 99212 OFFICE O/P EST SF 10 MIN: CPT

## 2025-05-06 PROCEDURE — 1036F TOBACCO NON-USER: CPT

## 2025-05-06 PROCEDURE — 1159F MED LIST DOCD IN RCRD: CPT

## 2025-05-06 PROCEDURE — 3017F COLORECTAL CA SCREEN DOC REV: CPT

## 2025-05-06 RX ORDER — CARVEDILOL 12.5 MG/1
25 TABLET ORAL 2 TIMES DAILY WITH MEALS
Qty: 368 TABLET | Refills: 1 | Status: SHIPPED | OUTPATIENT
Start: 2025-05-06

## 2025-05-06 RX ORDER — CETIRIZINE HYDROCHLORIDE 10 MG/1
10 TABLET ORAL DAILY
Qty: 30 TABLET | Refills: 1 | Status: CANCELLED | OUTPATIENT
Start: 2025-05-06

## 2025-05-06 RX ORDER — AZELASTINE 1 MG/ML
1 SPRAY, METERED NASAL 2 TIMES DAILY
Qty: 60 ML | Refills: 1 | Status: CANCELLED | OUTPATIENT
Start: 2025-05-06

## 2025-05-06 RX ORDER — CHOLECALCIFEROL (VITAMIN D3) 50 MCG
2000 TABLET ORAL DAILY
Qty: 90 TABLET | Refills: 1 | Status: SHIPPED | OUTPATIENT
Start: 2025-05-06

## 2025-05-06 RX ORDER — CHLORTHALIDONE 25 MG/1
25 TABLET ORAL DAILY
Qty: 90 TABLET | Refills: 1 | Status: SHIPPED | OUTPATIENT
Start: 2025-05-06

## 2025-05-06 RX ORDER — LOSARTAN POTASSIUM 100 MG/1
100 TABLET ORAL DAILY
Qty: 90 TABLET | Refills: 1 | Status: SHIPPED | OUTPATIENT
Start: 2025-05-06

## 2025-05-06 RX ORDER — HYDROCHLOROTHIAZIDE 25 MG/1
25 TABLET ORAL EVERY MORNING
COMMUNITY
Start: 2025-04-18 | End: 2025-05-06 | Stop reason: ALTCHOICE

## 2025-05-06 RX ORDER — AMLODIPINE BESYLATE 10 MG/1
10 TABLET ORAL DAILY
Qty: 90 TABLET | Refills: 1 | Status: SHIPPED | OUTPATIENT
Start: 2025-05-06

## 2025-05-06 RX ORDER — ATORVASTATIN CALCIUM 40 MG/1
40 TABLET, FILM COATED ORAL DAILY
Qty: 90 TABLET | Refills: 1 | Status: SHIPPED | OUTPATIENT
Start: 2025-05-06

## 2025-05-06 RX ORDER — IBUPROFEN 800 MG/1
800 TABLET, FILM COATED ORAL EVERY 8 HOURS PRN
Qty: 90 TABLET | Refills: 0 | Status: SHIPPED | OUTPATIENT
Start: 2025-05-06

## 2025-05-06 RX ORDER — ASPIRIN 81 MG/1
81 TABLET ORAL DAILY
Qty: 90 TABLET | Refills: 1 | Status: SHIPPED | OUTPATIENT
Start: 2025-05-06

## 2025-05-06 NOTE — PROGRESS NOTES
[facility] Internal Medicine      SUBJECTIVE:  Estefani Chiu (:  1955) is a 69 y.o. female here for evaluation of the following chief complaint(s):  Hypertension, Diabetes, and Follow-up    Patient is a 69-year-old female with a past medical history of type 2 diabetes mellitus, hypertension, hyperlipidemia, left ventricular outflow tract obstruction, resistant hypertension, COPD on 3 L intermittently at home on her baseline, previous smoker came to internal medicine clinic today for follow-up.  Patient states that she was recently seen by cardiologist, since patient feels much better recently, she does not want to do any further intervention from cardiology standpoint.  Patient was also recently seen by ENT for chronic allergic rhinitis.  Patient does not have any active acute complaints today.      Type 2 diabetes mellitus.  Controlled.  Last hemoglobin A1c was 6.9%.  Continue metformin.      Hyperlipidemia.  Continue Lipitor.      Resistant hypertension.  Continue amlodipine, losartan, Coreg and chlorthalidone.  Patient was stopped on afterload reduction antihypertensive at some point due to concern from left ventricular outflow tract obstruction, but since patient is feeling much better, and blood pressure has been stable, decision to continue this medications as of now.  Further recommendations deferred to cardiology.      COPD, chronic respiratory failure.  Patient currently on 3 L nasal cannula intermittently at home.  Follows up with pulmonology, did not do low-dose CT.  Patient refused low-dose CT today.          Review of Systems   Constitutional: Negative.    HENT: Negative.     Eyes: Negative.    Respiratory: Negative.  Negative for apnea, cough, choking and chest tightness.    Cardiovascular: Negative.  Negative for chest pain and leg swelling.   Gastrointestinal: Negative.  Negative for abdominal distention, abdominal pain and anal bleeding.

## 2025-05-06 NOTE — PATIENT INSTRUCTIONS
Dear Estefani Chiu,    Thank you for coming to your appointment at [facility].    Please make sure to do the following:    - Continue medications as listed and contact our office if medications are unavailable at the pharmacy.    - Complete any ordered lab work as instructed.    - If a referral was placed to another doctor's office, please contact our office in 5 business days if you have not heard from that office regarding the referral.    - If any imaging test was ordered at today's visit (ultrasound, CT scan, or MRI), expect a phone call to schedule in the next 5 business days. You may also call [facility] Imaging Scheduling department at [phone] to schedule.    Contact our office if you develop any new or worsening symptoms or if you have any questions regarding today's visit.    We aim to address your healthcare needs to your satisfaction!    Sincerely,  Bairon Mcnulty MD  5/6/2025  10:57 AM

## 2025-05-06 NOTE — PROGRESS NOTES
[facility]  Internal Medicine Residency Clinic    Attending Physician Statement  I have discussed the case, including pertinent history and exam findings with the resident physician.I agree with the assessment, plan and orders as documented by the resident. I have reviewed the relevant PMHx, PSHx, FamHx, SocialHx, medications, and allergies and updated history as appropriate.    Patient presents for routine follow up of medical problems.   Case discussed with PGY 1 in detail  Patient is being followed by cardiology for left ventricular outflow obstruction due to hypertrophy  Blood pressures under good control  Patient with high calcium cardiac score  Being evaluated by cardiology  Risk modification strategies discussed    Remainder of medical problems as per resident note.        Nolan Saenz MD  5/6/2025 11:12 AM

## 2025-05-14 ENCOUNTER — TELEPHONE (OUTPATIENT)
Dept: ENT CLINIC | Age: 70
End: 2025-05-14

## 2025-05-16 RX ORDER — MONTELUKAST SODIUM 10 MG/1
10 TABLET ORAL DAILY
Qty: 90 TABLET | Refills: 1 | Status: SHIPPED | OUTPATIENT
Start: 2025-05-16

## 2025-06-23 ENCOUNTER — OFFICE VISIT (OUTPATIENT)
Dept: ENT CLINIC | Age: 70
End: 2025-06-23
Payer: MEDICARE

## 2025-06-23 VITALS
TEMPERATURE: 98 F | SYSTOLIC BLOOD PRESSURE: 164 MMHG | DIASTOLIC BLOOD PRESSURE: 90 MMHG | BODY MASS INDEX: 31.83 KG/M2 | HEART RATE: 85 BPM | OXYGEN SATURATION: 97 % | WEIGHT: 174 LBS

## 2025-06-23 DIAGNOSIS — J30.9 CHRONIC ALLERGIC RHINITIS: Primary | ICD-10-CM

## 2025-06-23 DIAGNOSIS — Z91.09 ENVIRONMENTAL ALLERGIES: ICD-10-CM

## 2025-06-23 DIAGNOSIS — R09.81 NASAL CONGESTION: ICD-10-CM

## 2025-06-23 PROCEDURE — 1090F PRES/ABSN URINE INCON ASSESS: CPT | Performed by: OTOLARYNGOLOGY

## 2025-06-23 PROCEDURE — 1159F MED LIST DOCD IN RCRD: CPT | Performed by: OTOLARYNGOLOGY

## 2025-06-23 PROCEDURE — 1123F ACP DISCUSS/DSCN MKR DOCD: CPT | Performed by: OTOLARYNGOLOGY

## 2025-06-23 PROCEDURE — G8417 CALC BMI ABV UP PARAM F/U: HCPCS | Performed by: OTOLARYNGOLOGY

## 2025-06-23 PROCEDURE — 1036F TOBACCO NON-USER: CPT | Performed by: OTOLARYNGOLOGY

## 2025-06-23 PROCEDURE — G8427 DOCREV CUR MEDS BY ELIG CLIN: HCPCS | Performed by: OTOLARYNGOLOGY

## 2025-06-23 PROCEDURE — 3080F DIAST BP >= 90 MM HG: CPT | Performed by: OTOLARYNGOLOGY

## 2025-06-23 PROCEDURE — 3077F SYST BP >= 140 MM HG: CPT | Performed by: OTOLARYNGOLOGY

## 2025-06-23 PROCEDURE — 99214 OFFICE O/P EST MOD 30 MIN: CPT | Performed by: OTOLARYNGOLOGY

## 2025-06-23 PROCEDURE — 3017F COLORECTAL CA SCREEN DOC REV: CPT | Performed by: OTOLARYNGOLOGY

## 2025-06-23 PROCEDURE — G8400 PT W/DXA NO RESULTS DOC: HCPCS | Performed by: OTOLARYNGOLOGY

## 2025-06-26 NOTE — PROGRESS NOTES
[facility]  Dr. Wilbert Landin D.O. Ms.Ed        Patient Name:  Estefani Chiu  :  1955     CHIEF C/O:    Chief Complaint   Patient presents with    Follow-up     FOLLOW UP - 6wk sinus       HISTORY OBTAINED FROM:  patient    HISTORY OF PRESENT ILLNESS:       Estefani is a 69 y.o. year old female, here today for follow up of:         History of Present Illness  The patient presents for evaluation of sinus issues.    She reports persistent sinus congestion, which has been ongoing for several days. The severity of her symptoms fluctuates, with one side of her sinuses appearing more congested than the other at times. She also notes that her symptoms tend to worsen during the summer months.  She is increasing level facial pressure pain rhinorrhea without mucoid effusion discharge.  She has some bilateral ear fullness and no vertigo    MEDICATIONS  Singulair         Past Medical History:   Diagnosis Date    Asthma     Cerebral artery occlusion with cerebral infarction (HCC) 2009    Diabetes mellitus (HCC)     Hyperlipidemia     Hypertension      Past Surgical History:   Procedure Laterality Date    CHOLECYSTECTOMY      OVARIAN CYST REMOVAL  1987       Current Outpatient Medications:     montelukast (SINGULAIR) 10 MG tablet, Take 1 tablet by mouth daily, Disp: 90 tablet, Rfl: 1    amLODIPine (NORVASC) 10 MG tablet, Take 1 tablet by mouth daily, Disp: 90 tablet, Rfl: 1    aspirin 81 MG EC tablet, Take 1 tablet by mouth daily, Disp: 90 tablet, Rfl: 1    atorvastatin (LIPITOR) 40 MG tablet, Take 1 tablet by mouth daily, Disp: 90 tablet, Rfl: 1    carvedilol (COREG) 12.5 MG tablet, Take 2 tablets by mouth 2 times daily (with meals), Disp: 368 tablet, Rfl: 1    chlorthalidone (HYGROTON) 25 MG tablet, Take 1 tablet by mouth daily, Disp: 90 tablet, Rfl: 1    ibuprofen (ADVIL;MOTRIN) 800 MG tablet, Take 1 tablet by mouth every 8 hours as needed for Pain, Disp: 90 tablet, Rfl: 0    losartan (COZAAR)

## 2025-07-07 ENCOUNTER — OFFICE VISIT (OUTPATIENT)
Age: 70
End: 2025-07-07

## 2025-07-07 VITALS
SYSTOLIC BLOOD PRESSURE: 152 MMHG | TEMPERATURE: 97.3 F | HEART RATE: 78 BPM | DIASTOLIC BLOOD PRESSURE: 79 MMHG | OXYGEN SATURATION: 93 % | RESPIRATION RATE: 18 BRPM

## 2025-07-07 DIAGNOSIS — Z87.891 PERSONAL HISTORY OF TOBACCO USE: Primary | ICD-10-CM

## 2025-07-07 NOTE — PROGRESS NOTES
Orders Placed This Encounter    CT Lung Screen (Initial/Annual/Baseline)     Age: Patient is 69 y.o.    Smoking History:   Tobacco Use      Smoking status: Former        Years: 1 pack/day for 54.0 years (54.0 ttl pk-yrs)        Types: Cigarettes        Start date: 1969      Smokeless tobacco: Never      Tobacco comments: Pt smoked about a pack and half for years and then to a pack and then to just weekends has quit in 12/2023        Date of last lung cancer screening: No previous lung cancer screening exam     Standing Status:   Future     Expected Date:   7/7/2025     Expiration Date:   1/7/2027     Is there documentation of shared decision making?:   Yes     Is this a low dose CT or a routine CT?:   Low Dose CT [1]     Is this the first (baseline) CT or an annual exam?:   Baseline [1]     Does the patient show any signs or symptoms of lung cancer?:   No     Smoking Status?:   Former [4]     Date quit smoking?:   12/1/2023     Pack Years:   54    DE VISIT TO DISCUSS LUNG CA SCREEN W LDCT    Will schedule CT scan and return in 6 months  
81% predicted. TLC 2.99L 62% predicted. Diffusion 18.48ml/min/mmhg 85% predicted.  Flow volume loop consistent with restriction.     Overall interpretation: Moderate restriction, reduced MVV consistent with deconditioning    IMPRESSION:         Dyspnea on exertion  Abnormal PFTs with restriction  Personal history of tobacco use  Hilar adenopathy  Hypoxemic resp failure       ADRIEN         PLAN:      Raw data from Sleep study reviewed. AHI 17. Patient will follow up with Dr. Aragon.  Continued smoking cessation   Refills ordered for Pulmicort, Brovana, albuterol  Low-dose CT screening ordered  Echo reviewed from June.  No evidence of pulmonary hypertension there is however some LVOT at rest.  Patient is following up with cardiology  Continue to increase physical activity as tolerated.  Up-to-date vaccinations recommended    I hope this updates you on my evaluation and clinical thinking. Thank you for allowing me to participate in his care.     Sincerely,        Cindy Sandoval DO  [facility]  Office: [phone]  Fax: [phone]    Discussed with the patient the current USPSTF guidelines released March 9, 2021 for screening for lung cancer.    For adults aged 50 to 80 years who have a 20 pack-year smoking history and currently smoke or have quit within the past 15 years the grade B recommendation is to:  Screen for lung cancer with low-dose computed tomography (LDCT) every year.  Stop screening once a person has not smoked for 15 years or has a health problem that limits life expectancy or the ability to have lung surgery.    The patient  reports that she has quit smoking. Her smoking use included cigarettes. She started smoking about 56 years ago. She has a 54 pack-year smoking history. She has never used smokeless tobacco.. Discussed with patient the risks and benefits of screening, including over-diagnosis, false positive rate, and total radiation exposure.  The patient currently exhibits

## 2025-07-07 NOTE — PATIENT INSTRUCTIONS
follow-up, he or she will help you understand what to do next.  After a lung cancer screening, you can go back to your usual activities right away.  A lung cancer screening test can't tell if you have lung cancer. If your results are positive, your doctor can't tell whether an abnormal finding is a harmless nodule, cancer, or something else without doing more tests.  What can you do to help prevent lung cancer?  Some lung cancers can't be prevented. But if you smoke, quitting smoking is the best step you can take to prevent lung cancer. If you want to quit, your doctor can recommend medicines or other ways to help.  Follow-up care is a key part of your treatment and safety. Be sure to make and go to all appointments, and call your doctor if you are having problems. It's also a good idea to know your test results and keep a list of the medicines you take.  Where can you learn more?  Go to https://www.[employer].net/patientEd and enter Q940 to learn more about \"Learning About Lung Cancer Screening.\"  Current as of: October 25, 2024  Content Version: 14.5  © 6284-5313 [employer].   Care instructions adapted under license by [employer]. If you have questions about a medical condition or this instruction, always ask your healthcare professional. [employer], [employer], disclaims any warranty or liability for your use of this information.

## 2025-07-23 ENCOUNTER — OFFICE VISIT (OUTPATIENT)
Age: 70
End: 2025-07-23
Payer: MEDICARE

## 2025-07-23 VITALS
SYSTOLIC BLOOD PRESSURE: 125 MMHG | RESPIRATION RATE: 18 BRPM | HEIGHT: 62 IN | HEART RATE: 71 BPM | WEIGHT: 171.8 LBS | TEMPERATURE: 97.7 F | BODY MASS INDEX: 31.62 KG/M2 | OXYGEN SATURATION: 97 % | DIASTOLIC BLOOD PRESSURE: 74 MMHG

## 2025-07-23 DIAGNOSIS — E78.2 MIXED HYPERLIPIDEMIA: ICD-10-CM

## 2025-07-23 DIAGNOSIS — I1A.0 RESISTANT HYPERTENSION: Primary | ICD-10-CM

## 2025-07-23 DIAGNOSIS — M81.0 POST-MENOPAUSAL OSTEOPOROSIS: ICD-10-CM

## 2025-07-23 DIAGNOSIS — J44.9 CHRONIC OBSTRUCTIVE PULMONARY DISEASE, UNSPECIFIED COPD TYPE (HCC): ICD-10-CM

## 2025-07-23 DIAGNOSIS — Q24.8 LEFT VENTRICULAR OUTFLOW TRACT OBSTRUCTION: ICD-10-CM

## 2025-07-23 DIAGNOSIS — Z87.891 PERSONAL HISTORY OF TOBACCO USE: ICD-10-CM

## 2025-07-23 DIAGNOSIS — Z87.09 HISTORY OF CHRONIC SINUSITIS: ICD-10-CM

## 2025-07-23 DIAGNOSIS — Z00.00 WELCOME TO MEDICARE PREVENTIVE VISIT: ICD-10-CM

## 2025-07-23 DIAGNOSIS — E11.9 TYPE 2 DIABETES MELLITUS WITHOUT COMPLICATION, WITHOUT LONG-TERM CURRENT USE OF INSULIN (HCC): ICD-10-CM

## 2025-07-23 PROBLEM — J30.9 ALLERGIC SINUSITIS: Status: RESOLVED | Noted: 2025-07-23 | Resolved: 2025-07-23

## 2025-07-23 PROBLEM — J32.4 CHRONIC PANSINUSITIS: Status: RESOLVED | Noted: 2025-07-23 | Resolved: 2025-07-23

## 2025-07-23 PROBLEM — J32.4 CHRONIC PANSINUSITIS: Status: ACTIVE | Noted: 2025-07-23

## 2025-07-23 PROBLEM — J30.9 ALLERGIC SINUSITIS: Status: ACTIVE | Noted: 2025-07-23

## 2025-07-23 PROCEDURE — 3074F SYST BP LT 130 MM HG: CPT

## 2025-07-23 PROCEDURE — 1159F MED LIST DOCD IN RCRD: CPT

## 2025-07-23 PROCEDURE — 3078F DIAST BP <80 MM HG: CPT

## 2025-07-23 PROCEDURE — 3044F HG A1C LEVEL LT 7.0%: CPT

## 2025-07-23 PROCEDURE — 1123F ACP DISCUSS/DSCN MKR DOCD: CPT

## 2025-07-23 PROCEDURE — 3017F COLORECTAL CA SCREEN DOC REV: CPT

## 2025-07-23 PROCEDURE — G0296 VISIT TO DETERM LDCT ELIG: HCPCS

## 2025-07-23 PROCEDURE — G0438 PPPS, INITIAL VISIT: HCPCS

## 2025-07-23 RX ORDER — IBUPROFEN 800 MG/1
800 TABLET, FILM COATED ORAL EVERY 8 HOURS PRN
Qty: 90 TABLET | Refills: 1 | Status: SHIPPED | OUTPATIENT
Start: 2025-07-23 | End: 2025-10-21

## 2025-07-23 ASSESSMENT — PATIENT HEALTH QUESTIONNAIRE - PHQ9
2. FEELING DOWN, DEPRESSED OR HOPELESS: NOT AT ALL
1. LITTLE INTEREST OR PLEASURE IN DOING THINGS: NOT AT ALL
SUM OF ALL RESPONSES TO PHQ QUESTIONS 1-9: 0

## 2025-07-23 ASSESSMENT — LIFESTYLE VARIABLES
HOW OFTEN DO YOU HAVE A DRINK CONTAINING ALCOHOL: 2-4 TIMES A MONTH
HOW MANY STANDARD DRINKS CONTAINING ALCOHOL DO YOU HAVE ON A TYPICAL DAY: 1 OR 2

## 2025-07-23 NOTE — PATIENT INSTRUCTIONS
agent (health care proxy, health care surrogate). The form is also called a durable power of  for health care.  If you do not have an advance directive, decisions about your medical care may be made by a family member or doctor who doesn't know you or by a .  It may help to think of an advance directive as a gift to the people who care for you. If you have one, they won't have to make tough decisions by themselves.  For more information, including forms for your state, see the [employer] website (www.caringinfo.org/planning/advance-directives/).  Follow-up care is a key part of your treatment and safety. Be sure to make and go to all appointments, and call your doctor if you are having problems. It's also a good idea to know your test results and keep a list of the medicines you take.  What should you include in an advance directive?  Many states have a unique advance directive form. (It may ask you to address specific issues.) Or you might use a universal form that's approved by many states.  If your form doesn't tell you what to address, it may be hard to know what to include in your advance directive. Use the questions below to help you get started.  Who do you want to make decisions about your medical care if you are not able to?  What life-support measures do you want if you have a serious illness that gets worse over time or can't be cured?  What are you most afraid of that might happen? (Maybe you're afraid of having pain, losing your independence, or being kept alive by machines.)  Where would you prefer to die? (Your home? A hospital? A nursing home?)  Do you want to donate your organs when you die?  Do you want certain [employer] practices performed before you die?  When should you call for help?  Be sure to contact your doctor if you have any questions.  Where can you learn more?  Go to https://www.healthwise.net/patientEd and enter R264 to learn more about \"Advance Directives: Care

## 2025-07-23 NOTE — PROGRESS NOTES
Medicare Annual Wellness Visit    Estefani Chiu is here for Medicare AWV    Assessment & Plan     Resistant hypertension  - Chronic, at goal (stable), continue current treatment plan    - BP today's visit: 125/74  - Patient was stopped on afterload reduction antihypertensive at some point due to concern from left ventricular outflow tract obstruction, but since patient is feeling much better, and blood pressure has been stable, decision was made to continue those medications.   - Continue amlodipine 10 mg oral daily, carvedilol 12.5mg oral 2 times daily, chlorthalidone 25mg oral daily, losartan 100 mg oral daily  Orders:    Basic Metabolic Panel; Future    Type 2 diabetes mellitus without complication, without long-term current use of insulin (HCC)  - Chronic, at goal (stable), continue current treatment plan   - A1c 6.9 10/9/24  - Continue metformin 500 oral 2 times daily, aspirin 81 Mg oral 2 times daily    Orders:    Hemoglobin A1C; Future    Mixed hyperlipidemia  - Chronic, at goal (stable), continue current treatment plan  -10/14/2024 LDL 86, TG 59, cholesterol 147  - Continue Lipitor 40 mg oral daily, aspirin 81 Mg oral 2 times daily    Orders:    Lipid Panel; Future    Post-menopausal osteoporosis  - Dexa scan screening for post-menopausal osteoporosis ordered    Orders:    DEXA BONE DENSITY AXIAL SKELETON; Future    Chronic obstructive pulmonary disease, unspecified COPD type (HCC)  - Chronic, at goal (stable), continue current treatment plan  - Patient uses O2 3L nasal cannula intermittently at home   - Follows up with pulmonology, low-dose CT screening ordered 7/7/2025  - Continue Pulmicort, Brovana, albuterol inhaler    History of chronic sinusitis  - Allergic, Chronic, at goal (stable), continue current treatment plan  - Follow-up scheduled with ENT in 1 week, nasal CT pending.        -Vaccination and Colonoscopy screening discussed but patient denies.

## 2025-07-23 NOTE — PROGRESS NOTES
[facility]  Internal Medicine Residency Clinic  Attending Physician Statement:  Noel Luciano M.D., F.A.C.P.  Patient is seen for fu visit today.  -- acute and chronic problems addressed  I have seen/discussed the case, including pertinent history and exam findings with the resident, review of last visit medical records/labs/imaging if applicable-     I agree with the assessment, plan and orders as documented by the resident.    -Billiing assessed by medical complexity of case  My assessment of high points of today's visit as follows:      Seen few months ago last office visit  +nasal congestion - plan fu ENT and nasal CT pending  Also follows pulmonary - copd, igE inc eosinophilic asthma  Intermittent home o2    Type 2 diabetes mellitus.  Controlled.  Last hemoglobin A1c was 6.9%.  Continue metformin.        Hyperlipidemia.  Continue Lipitor.      Hx of Left venticular outflow obstruction--LVEF: TTE June 2025 65-70 % visual estimate  Cardio declined prior PET cardiac planned- cardiac PET stress test DCd  Resistant hypertension.  Continue amlodipine, losartan, Coreg and chlorthalidone.  No acute issues-- other than one year L neck area- shooting /electrical- nerve firing off pain for couple seconds rarely  Buty this was resumed without side effects    /74 (BP Site: Right Upper Arm, Patient Position: Sitting, BP Cuff Size: Medium Adult)   Pulse 71   Temp 97.7 °F (36.5 °C) (Temporal)   Resp 18   Ht 1.575 m (5' 2\")   Wt 77.9 kg (171 lb 12.8 oz)   SpO2 97%   BMI 31.42 kg/m²   Htn stable      Medicare annual visit  Health maintenance issues of vaccinations, social determinants/depression/CA screening, tobacco cessation etc...  Mood -stable  Mini mental OK  Social determinants- stable  ACP discussed  Vision, dental, hearing   Cataracts R surgery offered last year, deferred 2 to inc BP last year  Fall risk low  Long hx of declining vaccinations  CA screening - mammo done  She declined

## 2025-07-24 ENCOUNTER — HOSPITAL ENCOUNTER (OUTPATIENT)
Age: 70
Discharge: HOME | End: 2025-07-24
Attending: OTOLARYNGOLOGY
Payer: MEDICARE

## 2025-07-24 ENCOUNTER — HOSPITAL ENCOUNTER (OUTPATIENT)
Dept: CT IMAGING | Age: 70
Discharge: HOME | End: 2025-07-26
Attending: OTOLARYNGOLOGY
Payer: MEDICARE

## 2025-07-24 DIAGNOSIS — E11.9 TYPE 2 DIABETES MELLITUS WITHOUT COMPLICATION, WITHOUT LONG-TERM CURRENT USE OF INSULIN (HCC): ICD-10-CM

## 2025-07-24 DIAGNOSIS — J30.9 CHRONIC ALLERGIC RHINITIS: ICD-10-CM

## 2025-07-24 DIAGNOSIS — I1A.0 RESISTANT HYPERTENSION: ICD-10-CM

## 2025-07-24 DIAGNOSIS — R09.81 NASAL CONGESTION: ICD-10-CM

## 2025-07-24 DIAGNOSIS — E78.2 MIXED HYPERLIPIDEMIA: ICD-10-CM

## 2025-07-24 DIAGNOSIS — Z91.09 ENVIRONMENTAL ALLERGIES: ICD-10-CM

## 2025-07-24 LAB
ANION GAP SERPL CALCULATED.3IONS-SCNC: 13 MMOL/L (ref 7–16)
BUN SERPL-MCNC: 21 MG/DL (ref 8–23)
CALCIUM SERPL-MCNC: 10 MG/DL (ref 8.8–10.2)
CHLORIDE SERPL-SCNC: 102 MMOL/L (ref 98–107)
CHOLEST SERPL-MCNC: 187 MG/DL
CO2 SERPL-SCNC: 22 MMOL/L (ref 22–29)
CREAT SERPL-MCNC: 0.9 MG/DL (ref 0.5–1)
GFR, ESTIMATED: 65 ML/MIN/1.73M2
GLUCOSE SERPL-MCNC: 114 MG/DL (ref 74–99)
HBA1C MFR BLD: 6.4 % (ref 4–5.6)
HDLC SERPL-MCNC: 46 MG/DL
LDLC SERPL CALC-MCNC: 111 MG/DL
POTASSIUM SERPL-SCNC: 4.1 MMOL/L (ref 3.5–5.1)
SODIUM SERPL-SCNC: 138 MMOL/L (ref 136–145)
TRIGL SERPL-MCNC: 153 MG/DL
VLDLC SERPL CALC-MCNC: 31 MG/DL

## 2025-07-24 PROCEDURE — 36415 COLL VENOUS BLD VENIPUNCTURE: CPT

## 2025-07-24 PROCEDURE — 80061 LIPID PANEL: CPT

## 2025-07-24 PROCEDURE — 80048 BASIC METABOLIC PNL TOTAL CA: CPT

## 2025-07-24 PROCEDURE — 83036 HEMOGLOBIN GLYCOSYLATED A1C: CPT

## 2025-07-24 PROCEDURE — 70486 CT MAXILLOFACIAL W/O DYE: CPT

## 2025-07-28 ENCOUNTER — OFFICE VISIT (OUTPATIENT)
Dept: ENT CLINIC | Age: 70
End: 2025-07-28
Payer: MEDICARE

## 2025-07-28 VITALS
BODY MASS INDEX: 31.47 KG/M2 | HEIGHT: 62 IN | HEART RATE: 89 BPM | SYSTOLIC BLOOD PRESSURE: 119 MMHG | DIASTOLIC BLOOD PRESSURE: 73 MMHG | WEIGHT: 171 LBS | OXYGEN SATURATION: 93 % | TEMPERATURE: 98.1 F

## 2025-07-28 DIAGNOSIS — J30.9 CHRONIC ALLERGIC RHINITIS: Primary | ICD-10-CM

## 2025-07-28 DIAGNOSIS — R09.81 NASAL CONGESTION: ICD-10-CM

## 2025-07-28 DIAGNOSIS — Z91.09 ENVIRONMENTAL ALLERGIES: ICD-10-CM

## 2025-07-28 PROCEDURE — 3017F COLORECTAL CA SCREEN DOC REV: CPT | Performed by: OTOLARYNGOLOGY

## 2025-07-28 PROCEDURE — G8427 DOCREV CUR MEDS BY ELIG CLIN: HCPCS | Performed by: OTOLARYNGOLOGY

## 2025-07-28 PROCEDURE — 3078F DIAST BP <80 MM HG: CPT | Performed by: OTOLARYNGOLOGY

## 2025-07-28 PROCEDURE — 1123F ACP DISCUSS/DSCN MKR DOCD: CPT | Performed by: OTOLARYNGOLOGY

## 2025-07-28 PROCEDURE — 3074F SYST BP LT 130 MM HG: CPT | Performed by: OTOLARYNGOLOGY

## 2025-07-28 PROCEDURE — G8400 PT W/DXA NO RESULTS DOC: HCPCS | Performed by: OTOLARYNGOLOGY

## 2025-07-28 PROCEDURE — 99213 OFFICE O/P EST LOW 20 MIN: CPT | Performed by: OTOLARYNGOLOGY

## 2025-07-28 PROCEDURE — 1036F TOBACCO NON-USER: CPT | Performed by: OTOLARYNGOLOGY

## 2025-07-28 PROCEDURE — 1159F MED LIST DOCD IN RCRD: CPT | Performed by: OTOLARYNGOLOGY

## 2025-07-28 PROCEDURE — 1090F PRES/ABSN URINE INCON ASSESS: CPT | Performed by: OTOLARYNGOLOGY

## 2025-07-28 PROCEDURE — G8417 CALC BMI ABV UP PARAM F/U: HCPCS | Performed by: OTOLARYNGOLOGY

## 2025-07-28 RX ORDER — DOXYCYCLINE HYCLATE 100 MG
100 TABLET ORAL 2 TIMES DAILY
Qty: 42 TABLET | Refills: 0 | Status: SHIPPED | OUTPATIENT
Start: 2025-07-28 | End: 2025-08-18

## 2025-07-28 ASSESSMENT — ENCOUNTER SYMPTOMS
RHINORRHEA: 1
EYE ITCHING: 1
SINUS PRESSURE: 1

## 2025-07-28 NOTE — PROGRESS NOTES
[facility]  Dr. Wilbert Landin D.O. Ms.Ed        Patient Name:  Estefani Chiu  :  1955     CHIEF C/O:    Chief Complaint   Patient presents with    Follow-up     FOLLOW UP - 1 mo CT sinus (in [facility])           HISTORY OBTAINED FROM:  patient    HISTORY OF PRESENT ILLNESS:       Estefani is a 70 y.o. year old female, here today for follow up of:           Patient report sinus symptoms throughout the year, unsure how many infections per year. Patient using flonase, astelin, atrovent, and singulair. Patient continues to have congestion, throat clearing, PND, itchy eyes, sinus pressure, rhinorrhea. CT sinus completed, presents today to review.   History of Present Illness  The patient presents for evaluation of sinus issues.    She reports persistent sinus congestion, which has been ongoing for several days. The severity of her symptoms fluctuates, with one side of her sinuses appearing more congested than the other at times. She also notes that her symptoms tend to worsen during the summer months.  She is increasing level facial pressure pain rhinorrhea without mucoid effusion discharge.  She has some bilateral ear fullness and no vertigo    MEDICATIONS  Singulair         Past Medical History:   Diagnosis Date    Asthma     Cerebral artery occlusion with cerebral infarction (HCC) 2009    Diabetes mellitus (HCC)     Hyperlipidemia     Hypertension      Past Surgical History:   Procedure Laterality Date    CHOLECYSTECTOMY      OVARIAN CYST REMOVAL  1987       Current Outpatient Medications:     ibuprofen (ADVIL;MOTRIN) 800 MG tablet, Take 1 tablet by mouth every 8 hours as needed for Pain, Disp: 90 tablet, Rfl: 1    montelukast (SINGULAIR) 10 MG tablet, Take 1 tablet by mouth daily, Disp: 90 tablet, Rfl: 1    amLODIPine (NORVASC) 10 MG tablet, Take 1 tablet by mouth daily, Disp: 90 tablet, Rfl: 1    aspirin 81 MG EC tablet, Take 1 tablet by mouth daily, Disp: 90 tablet, Rfl: 1

## 2025-08-28 ENCOUNTER — TELEMEDICINE (OUTPATIENT)
Dept: SLEEP MEDICINE | Age: 70
End: 2025-08-28
Payer: MEDICARE

## 2025-08-28 DIAGNOSIS — G47.33 OSA (OBSTRUCTIVE SLEEP APNEA): Primary | ICD-10-CM

## 2025-08-28 DIAGNOSIS — E66.9 OBESITY (BMI 30-39.9): ICD-10-CM

## 2025-08-28 PROCEDURE — 99214 OFFICE O/P EST MOD 30 MIN: CPT | Performed by: STUDENT IN AN ORGANIZED HEALTH CARE EDUCATION/TRAINING PROGRAM
